# Patient Record
Sex: FEMALE | Race: WHITE | NOT HISPANIC OR LATINO | Employment: OTHER | URBAN - METROPOLITAN AREA
[De-identification: names, ages, dates, MRNs, and addresses within clinical notes are randomized per-mention and may not be internally consistent; named-entity substitution may affect disease eponyms.]

---

## 2019-02-21 ENCOUNTER — HOSPITAL ENCOUNTER (OUTPATIENT)
Dept: OTHER | Facility: HOSPITAL | Age: 79
Discharge: HOME | End: 2019-02-21
Attending: INTERNAL MEDICINE

## 2019-02-21 LAB
ALBUMIN SERPL-MCNC: 3.8 G/DL (ref 3.5–5)
ANION GAP SERPL CALC-SCNC: 16 MMOL/L (ref 8–19)
APPEARANCE UR: CLEAR
BILIRUB UR QL: NEGATIVE
BUN SERPL-MCNC: 23 MG/DL (ref 5–25)
BUN/CREAT SERPL: 14 {RATIO} (ref 6–20)
CALCIUM SERPL-MCNC: 9.4 MG/DL (ref 8.7–10.4)
CHLORIDE SERPL-SCNC: 105 MMOL/L (ref 99–111)
COLOR UR: YELLOW
CONV CO2: 25 MMOL/L (ref 22–32)
CONV COLLECTION SOURCE (UA): NORMAL
CONV CREATININE URINE, RANDOM: 146.8 MG/DL (ref 10–300)
CONV PROTEIN TO CREATININE RATIO (RANDOM URINE): 0.11 {RATIO} (ref 0–0.1)
CONV UROBILINOGEN IN URINE BY AUTOMATED TEST STRIP: 0.2 {EHRLICHU}/DL (ref 0.1–1)
CREAT UR-MCNC: 1.59 MG/DL (ref 0.5–0.9)
GFR SERPLBLD BASED ON 1.73 SQ M-ARVRAT: 30 ML/MIN/{1.73_M2}
GLUCOSE SERPL-MCNC: 121 MG/DL (ref 65–99)
GLUCOSE UR QL: NEGATIVE MG/DL
HGB UR QL STRIP: NEGATIVE
KETONES UR QL STRIP: NEGATIVE MG/DL
LEUKOCYTE ESTERASE UR QL STRIP: NEGATIVE
NITRITE UR QL STRIP: NEGATIVE
PH UR STRIP.AUTO: 5.5 [PH] (ref 5–8)
PHOSPHATE SERPL-MCNC: 3.2 MG/DL (ref 2.4–4.5)
POTASSIUM SERPL-SCNC: 4.4 MMOL/L (ref 3.5–5.3)
PROT UR QL: NEGATIVE MG/DL
PROT UR-MCNC: 16.6 MG/DL
SODIUM SERPL-SCNC: 142 MMOL/L (ref 135–147)
SP GR UR: 1.01 (ref 1–1.03)

## 2019-02-22 LAB — PTH-INTACT SERPL-MCNC: 66.4 PG/ML (ref 11.1–79.5)

## 2019-04-10 ENCOUNTER — HOSPITAL ENCOUNTER (OUTPATIENT)
Dept: OTHER | Facility: HOSPITAL | Age: 79
Discharge: HOME | End: 2019-04-10
Attending: INTERNAL MEDICINE

## 2019-04-10 LAB
ALBUMIN SERPL-MCNC: 4.1 G/DL (ref 3.5–5)
ALBUMIN/GLOB SERPL: 1.3 {RATIO} (ref 1.4–2.6)
ALP SERPL-CCNC: 81 U/L (ref 43–160)
ALT SERPL-CCNC: 19 U/L (ref 10–40)
ANION GAP SERPL CALC-SCNC: 23 MMOL/L (ref 8–19)
AST SERPL-CCNC: 25 U/L (ref 15–50)
BILIRUB SERPL-MCNC: 0.32 MG/DL (ref 0.2–1.3)
BUN SERPL-MCNC: 22 MG/DL (ref 5–25)
BUN/CREAT SERPL: 15 {RATIO} (ref 6–20)
CALCIUM SERPL-MCNC: 9.2 MG/DL (ref 8.7–10.4)
CHLORIDE SERPL-SCNC: 103 MMOL/L (ref 99–111)
CHOLEST SERPL-MCNC: 167 MG/DL (ref 107–200)
CHOLEST/HDLC SERPL: 3.6 {RATIO} (ref 3–6)
CONV CO2: 22 MMOL/L (ref 22–32)
CONV TOTAL PROTEIN: 7.3 G/DL (ref 6.3–8.2)
CREAT UR-MCNC: 1.5 MG/DL (ref 0.5–0.9)
EST. AVERAGE GLUCOSE BLD GHB EST-MCNC: 143 MG/DL
GFR SERPLBLD BASED ON 1.73 SQ M-ARVRAT: 32 ML/MIN/{1.73_M2}
GLOBULIN UR ELPH-MCNC: 3.2 G/DL (ref 2–3.5)
GLUCOSE SERPL-MCNC: 137 MG/DL (ref 65–99)
HBA1C MFR BLD: 6.6 % (ref 3.5–5.7)
HDLC SERPL-MCNC: 47 MG/DL (ref 40–60)
LDLC SERPL CALC-MCNC: 99 MG/DL (ref 70–100)
OSMOLALITY SERPL CALC.SUM OF ELEC: 301 MOSM/KG (ref 273–304)
PHOSPHATE SERPL-MCNC: 3.5 MG/DL (ref 2.4–4.5)
POTASSIUM SERPL-SCNC: 4.7 MMOL/L (ref 3.5–5.3)
PTH-INTACT SERPL-MCNC: 68.9 PG/ML (ref 11.1–79.5)
SODIUM SERPL-SCNC: 143 MMOL/L (ref 135–147)
TRIGL SERPL-MCNC: 104 MG/DL (ref 40–150)
VLDLC SERPL-MCNC: 21 MG/DL (ref 5–37)

## 2019-04-19 ENCOUNTER — HOSPITAL ENCOUNTER (OUTPATIENT)
Dept: ULTRASOUND IMAGING | Facility: HOSPITAL | Age: 79
Discharge: HOME | End: 2019-04-19
Attending: SPECIALIST

## 2019-05-13 ENCOUNTER — HOSPITAL ENCOUNTER (OUTPATIENT)
Dept: ULTRASOUND IMAGING | Facility: HOSPITAL | Age: 79
Discharge: HOME | End: 2019-05-13
Attending: PHYSICIAN ASSISTANT

## 2019-05-15 ENCOUNTER — HOSPITAL ENCOUNTER (OUTPATIENT)
Dept: SLEEP MEDICINE | Facility: HOSPITAL | Age: 79
Discharge: HOME | End: 2019-05-15
Attending: PSYCHIATRY & NEUROLOGY

## 2019-07-10 ENCOUNTER — HOSPITAL ENCOUNTER (OUTPATIENT)
Dept: URGENT CARE | Facility: CLINIC | Age: 79
Discharge: HOME | End: 2019-07-10
Attending: EMERGENCY MEDICINE

## 2019-08-23 ENCOUNTER — HOSPITAL ENCOUNTER (OUTPATIENT)
Dept: OTHER | Facility: HOSPITAL | Age: 79
Discharge: HOME | End: 2019-08-23
Attending: INTERNAL MEDICINE

## 2019-08-23 LAB
ALBUMIN SERPL-MCNC: 3.8 G/DL (ref 3.5–5)
ANION GAP SERPL CALC-SCNC: 17 MMOL/L (ref 8–19)
APPEARANCE UR: CLEAR
BILIRUB UR QL: NEGATIVE
BUN SERPL-MCNC: 19 MG/DL (ref 5–25)
BUN/CREAT SERPL: 13 {RATIO} (ref 6–20)
CALCIUM SERPL-MCNC: 9.5 MG/DL (ref 8.7–10.4)
CHLORIDE SERPL-SCNC: 102 MMOL/L (ref 99–111)
COLOR UR: YELLOW
CONV BACTERIA: NEGATIVE
CONV CO2: 25 MMOL/L (ref 22–32)
CONV COLLECTION SOURCE (UA): ABNORMAL
CONV UROBILINOGEN IN URINE BY AUTOMATED TEST STRIP: 0.2 {EHRLICHU}/DL (ref 0.1–1)
CREAT UR-MCNC: 1.45 MG/DL (ref 0.5–0.9)
GFR SERPLBLD BASED ON 1.73 SQ M-ARVRAT: 34 ML/MIN/{1.73_M2}
GLUCOSE SERPL-MCNC: 172 MG/DL (ref 65–99)
GLUCOSE UR QL: NEGATIVE MG/DL
HGB UR QL STRIP: NEGATIVE
KETONES UR QL STRIP: NEGATIVE MG/DL
LEUKOCYTE ESTERASE UR QL STRIP: ABNORMAL
NITRITE UR QL STRIP: NEGATIVE
PH UR STRIP.AUTO: 7.5 [PH] (ref 5–8)
PHOSPHATE SERPL-MCNC: 3.1 MG/DL (ref 2.4–4.5)
POTASSIUM SERPL-SCNC: 4.5 MMOL/L (ref 3.5–5.3)
PROT UR QL: NEGATIVE MG/DL
PTH-INTACT SERPL-MCNC: 33 PG/ML (ref 11.1–79.5)
RBC #/AREA URNS HPF: ABNORMAL /[HPF]
SODIUM SERPL-SCNC: 139 MMOL/L (ref 135–147)
SP GR UR: 1.01 (ref 1–1.03)
SQUAMOUS SPT QL MICRO: ABNORMAL /[HPF]
WBC #/AREA URNS HPF: ABNORMAL /[HPF]

## 2019-09-13 ENCOUNTER — HOSPITAL ENCOUNTER (OUTPATIENT)
Dept: ULTRASOUND IMAGING | Facility: HOSPITAL | Age: 79
Discharge: HOME | End: 2019-09-13
Attending: INTERNAL MEDICINE

## 2019-09-18 ENCOUNTER — HOSPITAL ENCOUNTER (OUTPATIENT)
Dept: OTHER | Facility: HOSPITAL | Age: 79
Discharge: HOME | End: 2019-09-18
Attending: OPHTHALMOLOGY

## 2019-10-11 ENCOUNTER — HOSPITAL ENCOUNTER (OUTPATIENT)
Dept: OTHER | Facility: HOSPITAL | Age: 79
Discharge: HOME | End: 2019-10-11

## 2019-10-11 LAB
ANION GAP SERPL CALC-SCNC: 15 MMOL/L (ref 8–19)
BUN SERPL-MCNC: 23 MG/DL (ref 5–25)
BUN/CREAT SERPL: 16 {RATIO} (ref 6–20)
CALCIUM SERPL-MCNC: 9.5 MG/DL (ref 8.7–10.4)
CHLORIDE SERPL-SCNC: 104 MMOL/L (ref 99–111)
CONV CO2: 26 MMOL/L (ref 22–32)
CONV CREATININE URINE, RANDOM: 29.2 MG/DL (ref 10–300)
CONV MICROALBUM.,U,RANDOM: <12 MG/L (ref 0–20)
CREAT UR-MCNC: 1.45 MG/DL (ref 0.5–0.9)
EST. AVERAGE GLUCOSE BLD GHB EST-MCNC: 169 MG/DL
GFR SERPLBLD BASED ON 1.73 SQ M-ARVRAT: 34 ML/MIN/{1.73_M2}
GLUCOSE SERPL-MCNC: 158 MG/DL (ref 65–99)
HBA1C MFR BLD: 7.5 % (ref 3.5–5.7)
MICROALBUMIN/CREAT UR: 41.1 MG/G{CRE} (ref 0–35)
OSMOLALITY SERPL CALC.SUM OF ELEC: 299 MOSM/KG (ref 273–304)
POTASSIUM SERPL-SCNC: 4.4 MMOL/L (ref 3.5–5.3)
SODIUM SERPL-SCNC: 141 MMOL/L (ref 135–147)

## 2019-10-14 ENCOUNTER — HOSPITAL ENCOUNTER (OUTPATIENT)
Dept: GENERAL RADIOLOGY | Facility: HOSPITAL | Age: 79
Discharge: HOME | End: 2019-10-14

## 2019-11-26 ENCOUNTER — HOSPITAL ENCOUNTER (OUTPATIENT)
Dept: SLEEP MEDICINE | Facility: HOSPITAL | Age: 79
Discharge: HOME | End: 2019-11-26
Attending: PSYCHIATRY & NEUROLOGY

## 2020-02-21 ENCOUNTER — HOSPITAL ENCOUNTER (OUTPATIENT)
Dept: OTHER | Facility: HOSPITAL | Age: 80
Discharge: HOME | End: 2020-02-21
Attending: NURSE PRACTITIONER

## 2020-02-21 LAB
25(OH)D3 SERPL-MCNC: 30.1 NG/ML (ref 30–100)
ALBUMIN SERPL-MCNC: 3.8 G/DL (ref 3.5–5)
ANION GAP SERPL CALC-SCNC: 17 MMOL/L (ref 8–19)
APPEARANCE UR: CLEAR
BASOPHILS # BLD AUTO: 0.03 10*3/UL (ref 0–0.2)
BASOPHILS NFR BLD AUTO: 0.6 % (ref 0–3)
BILIRUB UR QL: NEGATIVE
BUN SERPL-MCNC: 16 MG/DL (ref 5–25)
BUN/CREAT SERPL: 12 {RATIO} (ref 6–20)
CALCIUM SERPL-MCNC: 9.2 MG/DL (ref 8.7–10.4)
CHLORIDE SERPL-SCNC: 102 MMOL/L (ref 99–111)
COLOR UR: YELLOW
CONV ABS IMM GRAN: 0.01 10*3/UL (ref 0–0.2)
CONV BACTERIA: NEGATIVE
CONV CO2: 28 MMOL/L (ref 22–32)
CONV COLLECTION SOURCE (UA): ABNORMAL
CONV CREATININE URINE, RANDOM: 31.9 MG/DL (ref 10–300)
CONV IMMATURE GRAN: 0.2 % (ref 0–1.8)
CONV UROBILINOGEN IN URINE BY AUTOMATED TEST STRIP: 0.2 {EHRLICHU}/DL (ref 0.1–1)
CREAT UR-MCNC: 1.33 MG/DL (ref 0.5–0.9)
DEPRECATED RDW RBC AUTO: 43.7 FL (ref 36.4–46.3)
EOSINOPHIL # BLD AUTO: 0.2 10*3/UL (ref 0–0.7)
EOSINOPHIL # BLD AUTO: 4.1 % (ref 0–7)
ERYTHROCYTE [DISTWIDTH] IN BLOOD BY AUTOMATED COUNT: 13 % (ref 11.7–14.4)
GFR SERPLBLD BASED ON 1.73 SQ M-ARVRAT: 37 ML/MIN/{1.73_M2}
GLUCOSE SERPL-MCNC: 148 MG/DL (ref 65–99)
GLUCOSE UR QL: NEGATIVE MG/DL
HCT VFR BLD AUTO: 41.9 % (ref 37–47)
HGB BLD-MCNC: 13 G/DL (ref 12–16)
HGB UR QL STRIP: NEGATIVE
KETONES UR QL STRIP: NEGATIVE MG/DL
LEUKOCYTE ESTERASE UR QL STRIP: ABNORMAL
LYMPHOCYTES # BLD AUTO: 1.33 10*3/UL (ref 1–5)
LYMPHOCYTES NFR BLD AUTO: 27.5 % (ref 20–45)
MCH RBC QN AUTO: 28.6 PG (ref 27–31)
MCHC RBC AUTO-ENTMCNC: 31 G/DL (ref 33–37)
MCV RBC AUTO: 92.1 FL (ref 81–99)
MONOCYTES # BLD AUTO: 0.51 10*3/UL (ref 0.2–1.2)
MONOCYTES NFR BLD AUTO: 10.6 % (ref 3–10)
NEUTROPHILS # BLD AUTO: 2.75 10*3/UL (ref 2–8)
NEUTROPHILS NFR BLD AUTO: 57 % (ref 30–85)
NITRITE UR QL STRIP: NEGATIVE
NRBC CBCN: 0 % (ref 0–0.7)
PH UR STRIP.AUTO: 7 [PH] (ref 5–8)
PHOSPHATE SERPL-MCNC: 2.3 MG/DL (ref 2.4–4.5)
PLATELET # BLD AUTO: 158 10*3/UL (ref 130–400)
PMV BLD AUTO: 10.7 FL (ref 9.4–12.3)
POTASSIUM SERPL-SCNC: 4.7 MMOL/L (ref 3.5–5.3)
PROT UR QL: NEGATIVE MG/DL
PROT UR-MCNC: 6.3 MG/DL
PTH-INTACT SERPL-MCNC: 62 PG/ML (ref 11.1–79.5)
RBC # BLD AUTO: 4.55 10*6/UL (ref 4.2–5.4)
RBC #/AREA URNS HPF: ABNORMAL /[HPF]
SODIUM SERPL-SCNC: 142 MMOL/L (ref 135–147)
SP GR UR: 1.01 (ref 1–1.03)
WBC # BLD AUTO: 4.83 10*3/UL (ref 4.8–10.8)
WBC #/AREA URNS HPF: ABNORMAL /[HPF]

## 2020-03-10 ENCOUNTER — HOSPITAL ENCOUNTER (OUTPATIENT)
Dept: ULTRASOUND IMAGING | Facility: HOSPITAL | Age: 80
Discharge: HOME | End: 2020-03-10
Attending: INTERNAL MEDICINE

## 2020-06-10 ENCOUNTER — HOSPITAL ENCOUNTER (OUTPATIENT)
Dept: ULTRASOUND IMAGING | Facility: HOSPITAL | Age: 80
Discharge: HOME | End: 2020-06-10
Attending: PHYSICIAN ASSISTANT

## 2020-06-10 LAB
T4 FREE SERPL-MCNC: 1.2 NG/DL (ref 0.9–1.8)
TSH SERPL-ACNC: 0.64 M[IU]/L (ref 0.27–4.2)

## 2020-07-30 ENCOUNTER — HOSPITAL ENCOUNTER (OUTPATIENT)
Dept: ULTRASOUND IMAGING | Facility: HOSPITAL | Age: 80
Discharge: HOME | End: 2020-07-30
Attending: INTERNAL MEDICINE

## 2020-07-30 LAB
CREAT BLD-MCNC: 1.4 MG/DL (ref 0.6–1.4)
GFR SERPLBLD BASED ON 1.73 SQ M-ARVRAT: 35 ML/MIN/{1.73_M2}

## 2020-08-18 ENCOUNTER — HOSPITAL ENCOUNTER (OUTPATIENT)
Dept: LAB | Facility: HOSPITAL | Age: 80
Discharge: HOME | End: 2020-08-18
Attending: INTERNAL MEDICINE

## 2020-08-18 LAB
25(OH)D3 SERPL-MCNC: 38.5 NG/ML (ref 30–100)
ALBUMIN SERPL-MCNC: 3.9 G/DL (ref 3.5–5)
ANION GAP SERPL CALC-SCNC: 15 MMOL/L (ref 8–19)
APPEARANCE UR: CLEAR
BASOPHILS # BLD AUTO: 0.04 10*3/UL (ref 0–0.2)
BASOPHILS NFR BLD AUTO: 0.7 % (ref 0–3)
BILIRUB UR QL: NEGATIVE
BUN SERPL-MCNC: 28 MG/DL (ref 5–25)
BUN/CREAT SERPL: 16 {RATIO} (ref 6–20)
CALCIUM SERPL-MCNC: 9.7 MG/DL (ref 8.7–10.4)
CHLORIDE SERPL-SCNC: 101 MMOL/L (ref 99–111)
COLOR UR: YELLOW
CONV ABS IMM GRAN: 0.02 10*3/UL (ref 0–0.2)
CONV BACTERIA: NEGATIVE
CONV CO2: 28 MMOL/L (ref 22–32)
CONV COLLECTION SOURCE (UA): ABNORMAL
CONV CREATININE URINE, RANDOM: 58.2 MG/DL (ref 10–300)
CONV IMMATURE GRAN: 0.3 % (ref 0–1.8)
CONV PROTEIN TO CREATININE RATIO (RANDOM URINE): 0.11 {RATIO} (ref 0–0.1)
CONV UROBILINOGEN IN URINE BY AUTOMATED TEST STRIP: 0.2 {EHRLICHU}/DL (ref 0.1–1)
CREAT UR-MCNC: 1.8 MG/DL (ref 0.5–0.9)
DEPRECATED RDW RBC AUTO: 49.2 FL (ref 36.4–46.3)
EOSINOPHIL # BLD AUTO: 0.19 10*3/UL (ref 0–0.7)
EOSINOPHIL # BLD AUTO: 3.1 % (ref 0–7)
ERYTHROCYTE [DISTWIDTH] IN BLOOD BY AUTOMATED COUNT: 14.3 % (ref 11.7–14.4)
GFR SERPLBLD BASED ON 1.73 SQ M-ARVRAT: 26 ML/MIN/{1.73_M2}
GLUCOSE SERPL-MCNC: 180 MG/DL (ref 65–99)
GLUCOSE UR QL: NEGATIVE MG/DL
HCT VFR BLD AUTO: 43.5 % (ref 37–47)
HGB BLD-MCNC: 13.8 G/DL (ref 12–16)
HGB UR QL STRIP: NEGATIVE
KETONES UR QL STRIP: NEGATIVE MG/DL
LEUKOCYTE ESTERASE UR QL STRIP: ABNORMAL
LYMPHOCYTES # BLD AUTO: 1.39 10*3/UL (ref 1–5)
LYMPHOCYTES NFR BLD AUTO: 22.9 % (ref 20–45)
MCH RBC QN AUTO: 30.1 PG (ref 27–31)
MCHC RBC AUTO-ENTMCNC: 31.7 G/DL (ref 33–37)
MCV RBC AUTO: 94.8 FL (ref 81–99)
MONOCYTES # BLD AUTO: 0.57 10*3/UL (ref 0.2–1.2)
MONOCYTES NFR BLD AUTO: 9.4 % (ref 3–10)
NEUTROPHILS # BLD AUTO: 3.85 10*3/UL (ref 2–8)
NEUTROPHILS NFR BLD AUTO: 63.6 % (ref 30–85)
NITRITE UR QL STRIP: NEGATIVE
NRBC CBCN: 0 % (ref 0–0.7)
PH UR STRIP.AUTO: 6 [PH] (ref 5–8)
PHOSPHATE SERPL-MCNC: 3.3 MG/DL (ref 2.4–4.5)
PLATELET # BLD AUTO: 182 10*3/UL (ref 130–400)
PMV BLD AUTO: 11.2 FL (ref 9.4–12.3)
POTASSIUM SERPL-SCNC: 4.2 MMOL/L (ref 3.5–5.3)
PROT UR QL: NEGATIVE MG/DL
PROT UR-MCNC: 6.2 MG/DL
PTH-INTACT SERPL-MCNC: 47 PG/ML (ref 11.1–79.5)
RBC # BLD AUTO: 4.59 10*6/UL (ref 4.2–5.4)
RBC #/AREA URNS HPF: ABNORMAL /[HPF]
SODIUM SERPL-SCNC: 140 MMOL/L (ref 135–147)
SP GR UR: 1.01 (ref 1–1.03)
SQUAMOUS SPT QL MICRO: ABNORMAL /[HPF]
WBC # BLD AUTO: 6.06 10*3/UL (ref 4.8–10.8)
WBC #/AREA URNS HPF: ABNORMAL /[HPF]

## 2020-09-22 ENCOUNTER — HOSPITAL ENCOUNTER (OUTPATIENT)
Dept: ULTRASOUND IMAGING | Facility: HOSPITAL | Age: 80
Discharge: HOME | End: 2020-09-22
Attending: SPECIALIST

## 2020-10-02 ENCOUNTER — HOSPITAL ENCOUNTER (OUTPATIENT)
Dept: MAMMOGRAPHY | Facility: HOSPITAL | Age: 80
Discharge: HOME | End: 2020-10-02
Attending: INTERNAL MEDICINE

## 2020-11-18 ENCOUNTER — HOSPITAL ENCOUNTER (OUTPATIENT)
Dept: SLEEP MEDICINE | Facility: HOSPITAL | Age: 80
Discharge: HOME | End: 2020-11-18
Attending: PSYCHIATRY & NEUROLOGY

## 2020-12-11 ENCOUNTER — HOSPITAL ENCOUNTER (OUTPATIENT)
Dept: ULTRASOUND IMAGING | Facility: HOSPITAL | Age: 80
Discharge: HOME | End: 2020-12-11
Attending: PHYSICIAN ASSISTANT

## 2020-12-11 LAB
T4 FREE SERPL-MCNC: 1.1 NG/DL (ref 0.9–1.8)
TSH SERPL-ACNC: 0.52 M[IU]/L (ref 0.27–4.2)

## 2021-01-08 ENCOUNTER — HOSPITAL ENCOUNTER (OUTPATIENT)
Dept: OTHER | Facility: HOSPITAL | Age: 81
Discharge: HOME | End: 2021-01-08
Attending: INTERNAL MEDICINE

## 2021-01-08 LAB — PTH-INTACT SERPL-MCNC: 45.3 PG/ML (ref 11.1–79.5)

## 2021-02-02 ENCOUNTER — HOSPITAL ENCOUNTER (OUTPATIENT)
Dept: SLEEP MEDICINE | Facility: HOSPITAL | Age: 81
Discharge: HOME | End: 2021-02-02
Attending: PSYCHIATRY & NEUROLOGY

## 2021-03-23 ENCOUNTER — HOSPITAL ENCOUNTER (OUTPATIENT)
Dept: ULTRASOUND IMAGING | Facility: HOSPITAL | Age: 81
Discharge: HOME | End: 2021-03-23
Attending: INTERNAL MEDICINE

## 2021-07-26 ENCOUNTER — LAB (OUTPATIENT)
Dept: LAB | Facility: HOSPITAL | Age: 81
End: 2021-07-26

## 2021-07-26 ENCOUNTER — TRANSCRIBE ORDERS (OUTPATIENT)
Dept: LAB | Facility: HOSPITAL | Age: 81
End: 2021-07-26

## 2021-07-26 DIAGNOSIS — I10 ESSENTIAL HYPERTENSION, MALIGNANT: ICD-10-CM

## 2021-07-26 DIAGNOSIS — I48.0 PAROXYSMAL ATRIAL FIBRILLATION (HCC): Primary | ICD-10-CM

## 2021-07-26 DIAGNOSIS — I48.0 PAROXYSMAL ATRIAL FIBRILLATION (HCC): ICD-10-CM

## 2021-07-26 LAB
BASOPHILS # BLD AUTO: 0.05 10*3/MM3 (ref 0–0.2)
BASOPHILS NFR BLD AUTO: 0.8 % (ref 0–1.5)
DEPRECATED RDW RBC AUTO: 40.4 FL (ref 37–54)
EOSINOPHIL # BLD AUTO: 0.18 10*3/MM3 (ref 0–0.4)
EOSINOPHIL NFR BLD AUTO: 3 % (ref 0.3–6.2)
ERYTHROCYTE [DISTWIDTH] IN BLOOD BY AUTOMATED COUNT: 12.4 % (ref 12.3–15.4)
HCT VFR BLD AUTO: 41.1 % (ref 34–46.6)
HGB BLD-MCNC: 13.5 G/DL (ref 12–15.9)
IMM GRANULOCYTES # BLD AUTO: 0.02 10*3/MM3 (ref 0–0.05)
IMM GRANULOCYTES NFR BLD AUTO: 0.3 % (ref 0–0.5)
LYMPHOCYTES # BLD AUTO: 1.49 10*3/MM3 (ref 0.7–3.1)
LYMPHOCYTES NFR BLD AUTO: 24.8 % (ref 19.6–45.3)
MCH RBC QN AUTO: 29.9 PG (ref 26.6–33)
MCHC RBC AUTO-ENTMCNC: 32.8 G/DL (ref 31.5–35.7)
MCV RBC AUTO: 90.9 FL (ref 79–97)
MONOCYTES # BLD AUTO: 0.56 10*3/MM3 (ref 0.1–0.9)
MONOCYTES NFR BLD AUTO: 9.3 % (ref 5–12)
NEUTROPHILS NFR BLD AUTO: 3.72 10*3/MM3 (ref 1.7–7)
NEUTROPHILS NFR BLD AUTO: 61.8 % (ref 42.7–76)
NRBC BLD AUTO-RTO: 0 /100 WBC (ref 0–0.2)
PLATELET # BLD AUTO: 215 10*3/MM3 (ref 140–450)
PMV BLD AUTO: 10.9 FL (ref 6–12)
RBC # BLD AUTO: 4.52 10*6/MM3 (ref 3.77–5.28)
WBC # BLD AUTO: 6.02 10*3/MM3 (ref 3.4–10.8)

## 2021-07-26 PROCEDURE — 85025 COMPLETE CBC W/AUTO DIFF WBC: CPT

## 2021-07-26 PROCEDURE — 80048 BASIC METABOLIC PNL TOTAL CA: CPT

## 2021-07-26 PROCEDURE — 36415 COLL VENOUS BLD VENIPUNCTURE: CPT

## 2021-07-27 LAB
ANION GAP SERPL CALCULATED.3IONS-SCNC: 6.9 MMOL/L (ref 5–15)
BUN SERPL-MCNC: 16 MG/DL (ref 8–23)
BUN/CREAT SERPL: 11.1 (ref 7–25)
CALCIUM SPEC-SCNC: 9.2 MG/DL (ref 8.6–10.5)
CHLORIDE SERPL-SCNC: 105 MMOL/L (ref 98–107)
CO2 SERPL-SCNC: 29.1 MMOL/L (ref 22–29)
CREAT SERPL-MCNC: 1.44 MG/DL (ref 0.57–1)
GFR SERPL CREATININE-BSD FRML MDRD: 35 ML/MIN/1.73
GLUCOSE SERPL-MCNC: 94 MG/DL (ref 65–99)
POTASSIUM SERPL-SCNC: 4.6 MMOL/L (ref 3.5–5.2)
SODIUM SERPL-SCNC: 141 MMOL/L (ref 136–145)

## 2021-08-09 PROCEDURE — U0003 INFECTIOUS AGENT DETECTION BY NUCLEIC ACID (DNA OR RNA); SEVERE ACUTE RESPIRATORY SYNDROME CORONAVIRUS 2 (SARS-COV-2) (CORONAVIRUS DISEASE [COVID-19]), AMPLIFIED PROBE TECHNIQUE, MAKING USE OF HIGH THROUGHPUT TECHNOLOGIES AS DESCRIBED BY CMS-2020-01-R: HCPCS | Performed by: PHYSICIAN ASSISTANT

## 2021-08-09 PROCEDURE — U0005 INFEC AGEN DETEC AMPLI PROBE: HCPCS | Performed by: PHYSICIAN ASSISTANT

## 2021-09-16 ENCOUNTER — LAB (OUTPATIENT)
Dept: LAB | Facility: HOSPITAL | Age: 81
End: 2021-09-16

## 2021-09-16 ENCOUNTER — TRANSCRIBE ORDERS (OUTPATIENT)
Dept: ADMINISTRATIVE | Facility: HOSPITAL | Age: 81
End: 2021-09-16

## 2021-09-16 DIAGNOSIS — E55.9 VITAMIN D DEFICIENCY: ICD-10-CM

## 2021-09-16 DIAGNOSIS — N18.30 CHRONIC KIDNEY FAILURE, STAGE 3 (MODERATE) (HCC): Primary | ICD-10-CM

## 2021-09-16 DIAGNOSIS — N18.30 CHRONIC KIDNEY FAILURE, STAGE 3 (MODERATE) (HCC): ICD-10-CM

## 2021-09-16 LAB
25(OH)D3 SERPL-MCNC: 32.9 NG/ML
ALBUMIN SERPL-MCNC: 4 G/DL (ref 3.5–5.2)
ANION GAP SERPL CALCULATED.3IONS-SCNC: 10 MMOL/L (ref 5–15)
BASOPHILS # BLD AUTO: 0.04 10*3/MM3 (ref 0–0.2)
BASOPHILS NFR BLD AUTO: 0.7 % (ref 0–1.5)
BUN SERPL-MCNC: 17 MG/DL (ref 8–23)
BUN/CREAT SERPL: 12.8 (ref 7–25)
CALCIUM SPEC-SCNC: 9.5 MG/DL (ref 8.6–10.5)
CHLORIDE SERPL-SCNC: 105 MMOL/L (ref 98–107)
CO2 SERPL-SCNC: 25 MMOL/L (ref 22–29)
CREAT SERPL-MCNC: 1.33 MG/DL (ref 0.57–1)
DEPRECATED RDW RBC AUTO: 43.5 FL (ref 37–54)
EOSINOPHIL # BLD AUTO: 0.19 10*3/MM3 (ref 0–0.4)
EOSINOPHIL NFR BLD AUTO: 3.5 % (ref 0.3–6.2)
ERYTHROCYTE [DISTWIDTH] IN BLOOD BY AUTOMATED COUNT: 12.7 % (ref 12.3–15.4)
GFR SERPL CREATININE-BSD FRML MDRD: 38 ML/MIN/1.73
GLUCOSE SERPL-MCNC: 108 MG/DL (ref 65–99)
HCT VFR BLD AUTO: 43.5 % (ref 34–46.6)
HGB BLD-MCNC: 13.6 G/DL (ref 12–15.9)
IMM GRANULOCYTES # BLD AUTO: 0.02 10*3/MM3 (ref 0–0.05)
IMM GRANULOCYTES NFR BLD AUTO: 0.4 % (ref 0–0.5)
LYMPHOCYTES # BLD AUTO: 1.22 10*3/MM3 (ref 0.7–3.1)
LYMPHOCYTES NFR BLD AUTO: 22.5 % (ref 19.6–45.3)
MCH RBC QN AUTO: 29.3 PG (ref 26.6–33)
MCHC RBC AUTO-ENTMCNC: 31.3 G/DL (ref 31.5–35.7)
MCV RBC AUTO: 93.8 FL (ref 79–97)
MONOCYTES # BLD AUTO: 0.59 10*3/MM3 (ref 0.1–0.9)
MONOCYTES NFR BLD AUTO: 10.9 % (ref 5–12)
NEUTROPHILS NFR BLD AUTO: 3.36 10*3/MM3 (ref 1.7–7)
NEUTROPHILS NFR BLD AUTO: 62 % (ref 42.7–76)
NRBC BLD AUTO-RTO: 0 /100 WBC (ref 0–0.2)
PHOSPHATE SERPL-MCNC: 2.7 MG/DL (ref 2.5–4.5)
PLATELET # BLD AUTO: 178 10*3/MM3 (ref 140–450)
PMV BLD AUTO: 11.7 FL (ref 6–12)
POTASSIUM SERPL-SCNC: 4 MMOL/L (ref 3.5–5.2)
PTH-INTACT SERPL-MCNC: 28.3 PG/ML (ref 15–65)
RBC # BLD AUTO: 4.64 10*6/MM3 (ref 3.77–5.28)
SODIUM SERPL-SCNC: 140 MMOL/L (ref 136–145)
WBC # BLD AUTO: 5.42 10*3/MM3 (ref 3.4–10.8)

## 2021-09-16 PROCEDURE — 85025 COMPLETE CBC W/AUTO DIFF WBC: CPT

## 2021-09-16 PROCEDURE — 36415 COLL VENOUS BLD VENIPUNCTURE: CPT

## 2021-09-16 PROCEDURE — 80069 RENAL FUNCTION PANEL: CPT

## 2021-09-16 PROCEDURE — 82306 VITAMIN D 25 HYDROXY: CPT

## 2021-09-16 PROCEDURE — 83970 ASSAY OF PARATHORMONE: CPT

## 2021-09-27 ENCOUNTER — TRANSCRIBE ORDERS (OUTPATIENT)
Dept: ADMINISTRATIVE | Facility: HOSPITAL | Age: 81
End: 2021-09-27

## 2021-09-27 DIAGNOSIS — Z12.31 ENCOUNTER FOR SCREENING MAMMOGRAM FOR BREAST CANCER: Primary | ICD-10-CM

## 2021-09-27 DIAGNOSIS — K74.60 CIRRHOSIS OF LIVER WITHOUT ASCITES, UNSPECIFIED HEPATIC CIRRHOSIS TYPE (HCC): Primary | ICD-10-CM

## 2021-10-05 ENCOUNTER — OFFICE VISIT (OUTPATIENT)
Dept: SLEEP MEDICINE | Facility: HOSPITAL | Age: 81
End: 2021-10-05

## 2021-10-05 VITALS
WEIGHT: 208.8 LBS | OXYGEN SATURATION: 98 % | HEIGHT: 59 IN | SYSTOLIC BLOOD PRESSURE: 156 MMHG | TEMPERATURE: 97.1 F | HEART RATE: 72 BPM | BODY MASS INDEX: 42.09 KG/M2 | DIASTOLIC BLOOD PRESSURE: 79 MMHG

## 2021-10-05 DIAGNOSIS — G47.33 OSA (OBSTRUCTIVE SLEEP APNEA): ICD-10-CM

## 2021-10-05 PROCEDURE — G0463 HOSPITAL OUTPT CLINIC VISIT: HCPCS

## 2021-10-05 NOTE — PROGRESS NOTES
[facility]    SLEEP CLINIC FOLLOW UP PROGRESS NOTE.    Bella Ritter  1940  81 y.o.  female      PCP: Cindy Sal MD      Date of visit: 10/5/2021  The patient is returning for follow-up visit earlier than her scheduled appointment.  She is here today concerned about her machine being recalled.      HPI:  This is a 81 y.o. years old patient who has a history of obstructive sleep apnea..  Sleep apnea is severe with a AHI of 38 point/hr. Patient is using positive airway pressure therapy with CPAP and the symptoms of snoring, non-restorative sleep and daytime excessive sleepiness have improved significantly on the therapy. Normally goes to bed at 12 midnight and wakes up at 12 noon.  The patient wakes up 2-3 time(s) during the night usually to go to the bathroom and has no problem going back to sleep.  Feels refreshed after waking up.  Patient also denies headaches and nasal congestion.   She recently received notice about machine recall.  She was very concerned about this because she cannot sleep without her CPAP machine.  She decided to come and find out what could be done.  She is not sure how long she has had her latest CPAP machine however, she still has her old one.  She is using a full facemask and denies any problems with device settings.    Mediactions and allergies are reviewed by me and documented in the encounter.     SOCIAL ( habits pertaining to sleep medicine)  History of smoking:No   History of alcohol use: 0 per week  Caffeine use: 0    REVIEW OF SYSTEMS:   John Day Sleepiness Scale :Total score: 0   Nsaal congestion: No  Dry mouth/nose: Yes  Post nasal drip; no  Acid reflux/Heartburn: No  Abd bloating: Yes  Morining headache: No  Anxiety: No  Depression: No    Physical Exam :  CONSTITUTIONAL:  Vitals:    10/05/21 1500   BP: 156/79   BP Location: Left arm   Patient Position: Sitting   Pulse: 72   Temp: 97.1 °F (36.2 °C)   SpO2: 98%   Weight: 94.7 kg (208 lb 12.8 oz)   Height:  "149.9 cm (59\")    Body mass index is 42.17 kg/m².   She he is awake alert and oriented.  Responses are coherent and relevant.  She is very concerned about the machine recall.    Data reviewed:  The Smart card downloaded on 10/5/2021 has been reviewed independently by me for compliance and discussed the data with the patient.   Compliance; 100%  More than 4 hr use, 100%  Average use of the device 10 hours 32 minutes per night  Residual AHI: 7.7 /hr (goal < 5.0 /hr)  Mask type: Full face  DME: [facility]    ASSESSMENT AND PLAN:  · Obstructive sleep apnea ( G 47.33).  The symptoms of sleep apnea have improved with the device and the treatment.  Patient's compliance with the device is excellent for treatment of sleep apnea.  I have independently reviewed the smart card down load and discussed with the patient the download data and encouraged  the patient to continue to use the device.The residual AHI is acceptable. The patient is also instructed to get the supplies from the DME [facility] and and change them on a regular basis.  A prescription for supplies has been sent to the [facility].  I have also discussed the good sleep hygiene habits and adequate amount of sleep needed for good health.  · Obesity  · I have discussed with the patient different aspects about equipment recall and told her that she will probably get a replacement however, date of this is uncertain.  · In the meantime, she was advised to continue to use her CPAP machine regularly.  He is not going to use cleaning devices that are not endorsed by the .  · If she qualifies for a new CPAP unit, I told her that I can go ahead and order her a new one.  · The other option is to use her old CPAP machine if this is still available and still working.  · I also told her looking into the possibility that insurance may pay for a loaner while she is waiting for a replacement.  · She is going to bring her smart card from her old machine to see whether she can " use this.  · No follow-ups on file. . Patient's questions were answered.      Kimberly Rodriguez MD  10/5/2021

## 2021-10-07 ENCOUNTER — HOSPITAL ENCOUNTER (OUTPATIENT)
Dept: ULTRASOUND IMAGING | Facility: HOSPITAL | Age: 81
Discharge: HOME | End: 2021-10-07
Admitting: INTERNAL MEDICINE

## 2021-10-07 DIAGNOSIS — K74.60 CIRRHOSIS OF LIVER WITHOUT ASCITES, UNSPECIFIED HEPATIC CIRRHOSIS TYPE (HCC): ICD-10-CM

## 2021-10-07 PROCEDURE — 76705 ECHO EXAM OF ABDOMEN: CPT

## 2021-11-17 ENCOUNTER — TRANSCRIBE ORDERS (OUTPATIENT)
Dept: ADMINISTRATIVE | Facility: HOSPITAL | Age: 81
End: 2021-11-17

## 2021-11-17 DIAGNOSIS — E04.9 GOITER: Primary | ICD-10-CM

## 2021-12-07 ENCOUNTER — HOSPITAL ENCOUNTER (OUTPATIENT)
Dept: MAMMOGRAPHY | Facility: HOSPITAL | Age: 81
Discharge: HOME | End: 2021-12-07
Admitting: INTERNAL MEDICINE

## 2021-12-07 DIAGNOSIS — Z12.31 ENCOUNTER FOR SCREENING MAMMOGRAM FOR BREAST CANCER: ICD-10-CM

## 2021-12-07 PROCEDURE — 77067 SCR MAMMO BI INCL CAD: CPT

## 2021-12-07 PROCEDURE — 77063 BREAST TOMOSYNTHESIS BI: CPT

## 2021-12-15 ENCOUNTER — LAB (OUTPATIENT)
Dept: LAB | Facility: HOSPITAL | Age: 81
End: 2021-12-15

## 2021-12-15 ENCOUNTER — HOSPITAL ENCOUNTER (OUTPATIENT)
Dept: ULTRASOUND IMAGING | Facility: HOSPITAL | Age: 81
Discharge: HOME | End: 2021-12-15

## 2021-12-15 ENCOUNTER — TRANSCRIBE ORDERS (OUTPATIENT)
Dept: LAB | Facility: HOSPITAL | Age: 81
End: 2021-12-15

## 2021-12-15 DIAGNOSIS — E04.2 MULTINODULAR GOITER: ICD-10-CM

## 2021-12-15 DIAGNOSIS — E04.2 MULTINODULAR GOITER: Primary | ICD-10-CM

## 2021-12-15 DIAGNOSIS — E04.9 GOITER: ICD-10-CM

## 2021-12-15 PROCEDURE — 36415 COLL VENOUS BLD VENIPUNCTURE: CPT

## 2021-12-15 PROCEDURE — 76536 US EXAM OF HEAD AND NECK: CPT

## 2021-12-15 PROCEDURE — 84443 ASSAY THYROID STIM HORMONE: CPT

## 2021-12-15 PROCEDURE — 84439 ASSAY OF FREE THYROXINE: CPT

## 2021-12-16 LAB
T4 FREE SERPL-MCNC: 1.14 NG/DL (ref 0.93–1.7)
TSH SERPL DL<=0.05 MIU/L-ACNC: 0.55 UIU/ML (ref 0.27–4.2)

## 2022-01-10 ENCOUNTER — PREP FOR SURGERY (OUTPATIENT)
Dept: OTHER | Facility: HOSPITAL | Age: 82
End: 2022-01-10

## 2022-01-10 ENCOUNTER — OFFICE VISIT (OUTPATIENT)
Dept: GASTROENTEROLOGY | Facility: CLINIC | Age: 82
End: 2022-01-10

## 2022-01-10 VITALS
DIASTOLIC BLOOD PRESSURE: 62 MMHG | OXYGEN SATURATION: 97 % | SYSTOLIC BLOOD PRESSURE: 144 MMHG | HEIGHT: 59 IN | TEMPERATURE: 98.6 F | HEART RATE: 69 BPM | WEIGHT: 215.6 LBS | BODY MASS INDEX: 43.46 KG/M2

## 2022-01-10 DIAGNOSIS — E66.01 CLASS 3 SEVERE OBESITY DUE TO EXCESS CALORIES WITH SERIOUS COMORBIDITY AND BODY MASS INDEX (BMI) OF 40.0 TO 44.9 IN ADULT: ICD-10-CM

## 2022-01-10 DIAGNOSIS — R19.5 POSITIVE COLORECTAL CANCER SCREENING USING COLOGUARD TEST: Primary | ICD-10-CM

## 2022-01-10 DIAGNOSIS — K76.0 FATTY LIVER: ICD-10-CM

## 2022-01-10 PROBLEM — E66.813 CLASS 3 SEVERE OBESITY DUE TO EXCESS CALORIES WITH SERIOUS COMORBIDITY AND BODY MASS INDEX (BMI) OF 40.0 TO 44.9 IN ADULT: Status: ACTIVE | Noted: 2022-01-10

## 2022-01-10 PROCEDURE — 99204 OFFICE O/P NEW MOD 45 MIN: CPT | Performed by: NURSE PRACTITIONER

## 2022-01-10 RX ORDER — FLUTICASONE PROPIONATE 50 MCG
SPRAY, SUSPENSION (ML) NASAL EVERY 24 HOURS
COMMUNITY
Start: 2021-10-18 | End: 2023-01-26

## 2022-01-10 RX ORDER — FERROUS SULFATE 325(65) MG
TABLET ORAL EVERY 24 HOURS
COMMUNITY
Start: 2021-07-08 | End: 2022-02-04

## 2022-01-10 RX ORDER — SPIRONOLACTONE AND HYDROCHLOROTHIAZIDE 25; 25 MG/1; MG/1
0.75 TABLET ORAL DAILY
COMMUNITY
Start: 2021-10-11

## 2022-01-10 RX ORDER — POLYETHYLENE GLYCOL 3350, SODIUM SULFATE ANHYDROUS, SODIUM BICARBONATE, SODIUM CHLORIDE, POTASSIUM CHLORIDE 227.1; 21.5; 6.36; 5.53; .754 G/L; G/L; G/L; G/L; G/L
4 POWDER, FOR SOLUTION ORAL DAILY
Qty: 1 EACH | Refills: 0 | Status: SHIPPED | OUTPATIENT
Start: 2022-01-10 | End: 2022-01-11

## 2022-01-11 ENCOUNTER — LAB (OUTPATIENT)
Dept: LAB | Facility: HOSPITAL | Age: 82
End: 2022-01-11

## 2022-01-11 ENCOUNTER — TRANSCRIBE ORDERS (OUTPATIENT)
Dept: ADMINISTRATIVE | Facility: HOSPITAL | Age: 82
End: 2022-01-11

## 2022-01-11 DIAGNOSIS — E78.5 HYPERLIPIDEMIA, UNSPECIFIED HYPERLIPIDEMIA TYPE: ICD-10-CM

## 2022-01-11 DIAGNOSIS — M81.0 AGE RELATED OSTEOPOROSIS, UNSPECIFIED PATHOLOGICAL FRACTURE PRESENCE: ICD-10-CM

## 2022-01-11 DIAGNOSIS — I10 HYPERTENSION, ESSENTIAL: ICD-10-CM

## 2022-01-11 DIAGNOSIS — K74.60 HEPATIC CIRRHOSIS, UNSPECIFIED HEPATIC CIRRHOSIS TYPE, UNSPECIFIED WHETHER ASCITES PRESENT: ICD-10-CM

## 2022-01-11 DIAGNOSIS — E13.9 OTHER SPECIFIED DIABETES MELLITUS WITHOUT COMPLICATIONS: ICD-10-CM

## 2022-01-11 DIAGNOSIS — M51.37 OTHER INTERVERTEBRAL DISC DEGENERATION, LUMBOSACRAL REGION: ICD-10-CM

## 2022-01-11 DIAGNOSIS — Z00.00 ROUTINE GENERAL MEDICAL EXAMINATION AT A HEALTH CARE FACILITY: Primary | ICD-10-CM

## 2022-01-11 DIAGNOSIS — Z00.00 ROUTINE GENERAL MEDICAL EXAMINATION AT A HEALTH CARE FACILITY: ICD-10-CM

## 2022-01-11 LAB
25(OH)D3 SERPL-MCNC: 30.7 NG/ML (ref 30–100)
ALBUMIN SERPL-MCNC: 4.1 G/DL (ref 3.5–5.2)
ALBUMIN/GLOB SERPL: 1.5 G/DL
ALP SERPL-CCNC: 88 U/L (ref 39–117)
ALT SERPL W P-5'-P-CCNC: 30 U/L (ref 1–33)
ANION GAP SERPL CALCULATED.3IONS-SCNC: 7.2 MMOL/L (ref 5–15)
AST SERPL-CCNC: 34 U/L (ref 1–32)
BASOPHILS # BLD AUTO: 0.05 10*3/MM3 (ref 0–0.2)
BASOPHILS NFR BLD AUTO: 1 % (ref 0–1.5)
BILIRUB SERPL-MCNC: 0.4 MG/DL (ref 0–1.2)
BUN SERPL-MCNC: 20 MG/DL (ref 8–23)
BUN/CREAT SERPL: 14.7 (ref 7–25)
CALCIUM SPEC-SCNC: 9.6 MG/DL (ref 8.6–10.5)
CHLORIDE SERPL-SCNC: 103 MMOL/L (ref 98–107)
CO2 SERPL-SCNC: 28.8 MMOL/L (ref 22–29)
CREAT SERPL-MCNC: 1.36 MG/DL (ref 0.57–1)
DEPRECATED RDW RBC AUTO: 40.7 FL (ref 37–54)
EOSINOPHIL # BLD AUTO: 0.18 10*3/MM3 (ref 0–0.4)
EOSINOPHIL NFR BLD AUTO: 3.4 % (ref 0.3–6.2)
ERYTHROCYTE [DISTWIDTH] IN BLOOD BY AUTOMATED COUNT: 12.5 % (ref 12.3–15.4)
FOLATE SERPL-MCNC: 8.5 NG/ML (ref 4.78–24.2)
GFR SERPL CREATININE-BSD FRML MDRD: 37 ML/MIN/1.73
GLOBULIN UR ELPH-MCNC: 2.8 GM/DL
GLUCOSE SERPL-MCNC: 137 MG/DL (ref 65–99)
HBA1C MFR BLD: 8.61 % (ref 4.8–5.6)
HCT VFR BLD AUTO: 39.9 % (ref 34–46.6)
HGB BLD-MCNC: 13 G/DL (ref 12–15.9)
IMM GRANULOCYTES # BLD AUTO: 0.02 10*3/MM3 (ref 0–0.05)
IMM GRANULOCYTES NFR BLD AUTO: 0.4 % (ref 0–0.5)
LYMPHOCYTES # BLD AUTO: 1.25 10*3/MM3 (ref 0.7–3.1)
LYMPHOCYTES NFR BLD AUTO: 23.9 % (ref 19.6–45.3)
MCH RBC QN AUTO: 29.1 PG (ref 26.6–33)
MCHC RBC AUTO-ENTMCNC: 32.6 G/DL (ref 31.5–35.7)
MCV RBC AUTO: 89.5 FL (ref 79–97)
MONOCYTES # BLD AUTO: 0.49 10*3/MM3 (ref 0.1–0.9)
MONOCYTES NFR BLD AUTO: 9.4 % (ref 5–12)
NEUTROPHILS NFR BLD AUTO: 3.24 10*3/MM3 (ref 1.7–7)
NEUTROPHILS NFR BLD AUTO: 61.9 % (ref 42.7–76)
NRBC BLD AUTO-RTO: 0.2 /100 WBC (ref 0–0.2)
PLATELET # BLD AUTO: 200 10*3/MM3 (ref 140–450)
PMV BLD AUTO: 11.4 FL (ref 6–12)
POTASSIUM SERPL-SCNC: 4.2 MMOL/L (ref 3.5–5.2)
PROT SERPL-MCNC: 6.9 G/DL (ref 6–8.5)
RBC # BLD AUTO: 4.46 10*6/MM3 (ref 3.77–5.28)
SODIUM SERPL-SCNC: 139 MMOL/L (ref 136–145)
VIT B12 BLD-MCNC: 537 PG/ML (ref 211–946)
WBC NRBC COR # BLD: 5.23 10*3/MM3 (ref 3.4–10.8)

## 2022-01-11 PROCEDURE — 80053 COMPREHEN METABOLIC PANEL: CPT

## 2022-01-11 PROCEDURE — 82306 VITAMIN D 25 HYDROXY: CPT

## 2022-01-11 PROCEDURE — 82746 ASSAY OF FOLIC ACID SERUM: CPT

## 2022-01-11 PROCEDURE — 83036 HEMOGLOBIN GLYCOSYLATED A1C: CPT

## 2022-01-11 PROCEDURE — 36415 COLL VENOUS BLD VENIPUNCTURE: CPT

## 2022-01-11 PROCEDURE — 85025 COMPLETE CBC W/AUTO DIFF WBC: CPT

## 2022-01-11 PROCEDURE — 82607 VITAMIN B-12: CPT

## 2022-02-04 RX ORDER — METOPROLOL SUCCINATE 50 MG/1
50 TABLET, EXTENDED RELEASE ORAL DAILY
COMMUNITY

## 2022-02-04 RX ORDER — HYDRALAZINE HYDROCHLORIDE 25 MG/1
25 TABLET, FILM COATED ORAL 3 TIMES DAILY
COMMUNITY

## 2022-02-04 NOTE — PRE-PROCEDURE INSTRUCTIONS
Called patient to discuss instructions for laxative. Patient stated understanding for 2 part prep. Discussed skin prep, clear liquid diet, and pre-op medications. Patient aware she can take Gabapentin, Zyrtec, Protonix, and Metoprolol. Patient stated understanding. Patient is aware she needs to stop her Eliquis on 2/6/22 per Provider.  No other issues or concerns noted currently per patient.   Blood Glucose ordered.  Patient is vaccinated for covid-19.

## 2022-02-08 ENCOUNTER — HOSPITAL ENCOUNTER (OUTPATIENT)
Facility: HOSPITAL | Age: 82
Setting detail: HOSPITAL OUTPATIENT SURGERY
Discharge: HOME | End: 2022-02-08
Attending: INTERNAL MEDICINE | Admitting: INTERNAL MEDICINE

## 2022-02-08 ENCOUNTER — ANESTHESIA EVENT (OUTPATIENT)
Dept: GASTROENTEROLOGY | Facility: HOSPITAL | Age: 82
End: 2022-02-08

## 2022-02-08 ENCOUNTER — ANESTHESIA (OUTPATIENT)
Dept: GASTROENTEROLOGY | Facility: HOSPITAL | Age: 82
End: 2022-02-08

## 2022-02-08 VITALS
BODY MASS INDEX: 42.58 KG/M2 | RESPIRATION RATE: 18 BRPM | DIASTOLIC BLOOD PRESSURE: 61 MMHG | TEMPERATURE: 97.1 F | WEIGHT: 211.2 LBS | HEIGHT: 59 IN | SYSTOLIC BLOOD PRESSURE: 138 MMHG | OXYGEN SATURATION: 98 % | HEART RATE: 61 BPM

## 2022-02-08 DIAGNOSIS — R19.5 POSITIVE COLORECTAL CANCER SCREENING USING COLOGUARD TEST: ICD-10-CM

## 2022-02-08 DIAGNOSIS — E66.01 CLASS 3 SEVERE OBESITY DUE TO EXCESS CALORIES WITH SERIOUS COMORBIDITY AND BODY MASS INDEX (BMI) OF 40.0 TO 44.9 IN ADULT: ICD-10-CM

## 2022-02-08 DIAGNOSIS — K76.0 FATTY LIVER: ICD-10-CM

## 2022-02-08 LAB
GLUCOSE BLDC GLUCOMTR-MCNC: 179 MG/DL (ref 70–130)
GLUCOSE BLDC GLUCOMTR-MCNC: 179 MG/DL (ref 70–99)

## 2022-02-08 PROCEDURE — 45385 COLONOSCOPY W/LESION REMOVAL: CPT | Performed by: INTERNAL MEDICINE

## 2022-02-08 PROCEDURE — C1889 IMPLANT/INSERT DEVICE, NOC: HCPCS | Performed by: INTERNAL MEDICINE

## 2022-02-08 PROCEDURE — 45390 COLONOSCOPY W/RESECTION: CPT | Performed by: INTERNAL MEDICINE

## 2022-02-08 PROCEDURE — 43236 UPPR GI SCOPE W/SUBMUC INJ: CPT | Performed by: INTERNAL MEDICINE

## 2022-02-08 PROCEDURE — 43239 EGD BIOPSY SINGLE/MULTIPLE: CPT | Performed by: INTERNAL MEDICINE

## 2022-02-08 PROCEDURE — 82962 GLUCOSE BLOOD TEST: CPT

## 2022-02-08 PROCEDURE — 43251 EGD REMOVE LESION SNARE: CPT | Performed by: INTERNAL MEDICINE

## 2022-02-08 PROCEDURE — 88305 TISSUE EXAM BY PATHOLOGIST: CPT | Performed by: INTERNAL MEDICINE

## 2022-02-08 PROCEDURE — 25010000002 PROPOFOL 10 MG/ML EMULSION: Performed by: NURSE ANESTHETIST, CERTIFIED REGISTERED

## 2022-02-08 DEVICE — DEV CLIP HEMO RESOLUTION360/ULTR 235CM 17MM STRL: Type: IMPLANTABLE DEVICE | Site: STOMACH | Status: FUNCTIONAL

## 2022-02-08 RX ORDER — LIDOCAINE HYDROCHLORIDE 20 MG/ML
INJECTION, SOLUTION INFILTRATION; PERINEURAL AS NEEDED
Status: DISCONTINUED | OUTPATIENT
Start: 2022-02-08 | End: 2022-02-08 | Stop reason: SURG

## 2022-02-08 RX ORDER — SODIUM CHLORIDE, SODIUM LACTATE, POTASSIUM CHLORIDE, CALCIUM CHLORIDE 600; 310; 30; 20 MG/100ML; MG/100ML; MG/100ML; MG/100ML
30 INJECTION, SOLUTION INTRAVENOUS CONTINUOUS
Status: DISCONTINUED | OUTPATIENT
Start: 2022-02-08 | End: 2022-02-08 | Stop reason: HOSPADM

## 2022-02-08 RX ADMIN — SODIUM CHLORIDE, POTASSIUM CHLORIDE, SODIUM LACTATE AND CALCIUM CHLORIDE: 600; 310; 30; 20 INJECTION, SOLUTION INTRAVENOUS at 11:21

## 2022-02-08 RX ADMIN — SODIUM CHLORIDE, POTASSIUM CHLORIDE, SODIUM LACTATE AND CALCIUM CHLORIDE: 600; 310; 30; 20 INJECTION, SOLUTION INTRAVENOUS at 10:19

## 2022-02-08 RX ADMIN — LIDOCAINE HYDROCHLORIDE 50 MG: 20 INJECTION, SOLUTION INFILTRATION; PERINEURAL at 10:22

## 2022-02-08 RX ADMIN — PROPOFOL 250 MCG/KG/MIN: 10 INJECTION, EMULSION INTRAVENOUS at 10:22

## 2022-02-08 NOTE — DISCHARGE INSTRUCTIONS
Colon Polyps    Colon polyps are tissue growths inside the colon, which is part of the large intestine. They are one of the types of polyps that can grow in the body. A polyp may be a round bump or a mushroom-shaped growth. You could have one polyp or more than one.  Most colon polyps are noncancerous (benign). However, some colon polyps can become cancerous over time. Finding and removing the polyps early can help prevent this.  What are the causes?  The exact cause of colon polyps is not known.  What increases the risk?  The following factors may make you more likely to develop this condition:  · Having a family history of colorectal cancer or colon polyps.  · Being older than 45 years of age.  · Being younger than 45 years of age and having a significant family history of colorectal cancer or colon polyps or a genetic condition that puts you at higher risk of getting colon polyps.  · Having inflammatory bowel disease, such as ulcerative colitis or Crohn's disease.  · Having certain conditions passed from parent to child (hereditary conditions), such as:  ? Familial adenomatous polyposis (FAP).  ? Murcia syndrome.  ? Turcot syndrome.  ? Peutz-Jeghers syndrome.  ? MUTYH-associated polyposis (MAP).  · Being overweight.  · Certain lifestyle factors. These include smoking cigarettes, drinking too much alcohol, not getting enough exercise, and eating a diet that is high in fat and red meat and low in fiber.  · Having had childhood cancer that was treated with radiation of the abdomen.  What are the signs or symptoms?  Many times, there are no symptoms.  If you have symptoms, they may include:  · Blood coming from the rectum during a bowel movement.  · Blood in the stool (feces). The blood may be bright red or very dark in color.  · Pain in the abdomen.  · A change in bowel habits, such as constipation or diarrhea.  How is this diagnosed?  This condition is diagnosed with a colonoscopy. This is a procedure in which a  lighted, flexible scope is inserted into the opening between the buttocks (anus) and then passed into the colon to examine the area. Polyps are sometimes found when a colonoscopy is done as part of routine cancer screening tests.  How is this treated?  This condition is treated by removing any polyps that are found. Most polyps can be removed during a colonoscopy. Those polyps will then be tested for cancer. Additional treatment may be needed depending on the results of testing.  Follow these instructions at home:  Eating and drinking    · Eat foods that are high in fiber, such as fruits, vegetables, and whole grains.  · Eat foods that are high in calcium and vitamin D, such as milk, cheese, yogurt, eggs, liver, fish, and broccoli.  · Limit foods that are high in fat, such as fried foods and desserts.  · Limit the amount of red meat, precooked or cured meat, or other processed meat that you eat, such as hot dogs, sausages, gonsalves, or meat loaves.  · Limit sugary drinks.    Lifestyle  · Maintain a healthy weight, or lose weight if recommended by your health care provider.  · Exercise every day or as told by your health care provider.  · Do not use any products that contain nicotine or tobacco, such as cigarettes, e-cigarettes, and chewing tobacco. If you need help quitting, ask your health care provider.  · Do not drink alcohol if:  ? Your health care provider tells you not to drink.  ? You are pregnant, may be pregnant, or are planning to become pregnant.  · If you drink alcohol:  ? Limit how much you use to:  § 0-1 drink a day for women.  § 0-2 drinks a day for men.  ? Know how much alcohol is in your drink. In the [place] one drink equals one 12 oz bottle of beer (355 mL), one 5 oz glass of wine (148 mL), or one 1½ oz glass of hard liquor (44 mL).  General instructions  · Take over-the-counter and prescription medicines only as told by your health care provider.  · Keep all follow-up visits. This is important. This  includes having regularly scheduled colonoscopies. Talk to your health care provider about when you need a colonoscopy.  Contact a health care provider if:  · You have new or worsening bleeding during a bowel movement.  · You have new or increased blood in your stool.  · You have a change in bowel habits.  · You lose weight for no known reason.  Summary  · Colon polyps are tissue growths inside the colon, which is part of the large intestine. They are one type of polyp that can grow in the body.  · Most colon polyps are noncancerous (benign), but some can become cancerous over time.  · This condition is diagnosed with a colonoscopy.  · This condition is treated by removing any polyps that are found. Most polyps can be removed during a colonoscopy.  This information is not intended to replace advice given to you by your health care provider. Make sure you discuss any questions you have with your health care provider.  Document Revised: 04/07/2021 Document Reviewed: 04/07/2021  [employer] Patient Education © 2021 [employer]  Hiatal Hernia    A hiatal hernia occurs when part of the stomach slides above the muscle that separates the abdomen from the chest (diaphragm). A person can be born with a hiatal hernia (congenital), or it may develop over time. In almost all cases of hiatal hernia, only the top part of the stomach pushes through the diaphragm.  Many people have a hiatal hernia with no symptoms. The larger the hernia, the more likely it is that you will have symptoms. In some cases, a hiatal hernia allows stomach acid to flow back into the tube that carries food from your mouth to your stomach (esophagus). This may cause heartburn symptoms. Severe heartburn symptoms may mean that you have developed a condition called gastroesophageal reflux disease (GERD).  What are the causes?  This condition is caused by a weakness in the opening (hiatus) where the esophagus passes through the diaphragm to attach to the upper  part of the stomach. A person may be born with a weakness in the hiatus, or a weakness can develop over time.  What increases the risk?  This condition is more likely to develop in:  · Older people. Age is a major risk factor for a hiatal hernia, especially if you are over the age of 50.  · Pregnant women.  · People who are overweight.  · People who have frequent constipation.  What are the signs or symptoms?  Symptoms of this condition usually develop in the form of GERD symptoms. Symptoms include:  · Heartburn.  · Belching.  · Indigestion.  · Trouble swallowing.  · Coughing or wheezing.  · Sore throat.  · Hoarseness.  · Chest pain.  · Nausea and vomiting.  How is this diagnosed?  This condition may be diagnosed during testing for GERD. Tests that may be done include:  · X-rays of your stomach or chest.  · An upper gastrointestinal (GI) series. This is an X-ray exam of your GI tract that is taken after you swallow a chalky liquid that shows up clearly on the X-ray.  · Endoscopy. This is a procedure to look into your stomach using a thin, flexible tube that has a tiny camera and light on the end of it.  How is this treated?  This condition may be treated by:  · Dietary and lifestyle changes to help reduce GERD symptoms.  · Medicines. These may include:  ? Over-the-counter antacids.  ? Medicines that make your stomach empty more quickly.  ? Medicines that block the production of stomach acid (H2 blockers).  ? Stronger medicines to reduce stomach acid (proton pump inhibitors).  · Surgery to repair the hernia, if other treatments are not helping.  If you have no symptoms, you may not need treatment.  Follow these instructions at home:  Lifestyle and activity  · Do not use any products that contain nicotine or tobacco, such as cigarettes and e-cigarettes. If you need help quitting, ask your health care provider.  · Try to achieve and maintain a healthy body weight.  · Avoid putting pressure on your abdomen. Anything  that puts pressure on your abdomen increases the amount of acid that may be pushed up into your esophagus.  ? Avoid bending over, especially after eating.  ? Raise the head of your bed by putting blocks under the legs. This keeps your head and esophagus higher than your stomach.  ? Do not wear tight clothing around your chest or stomach.  ? Try not to strain when having a bowel movement, when urinating, or when lifting heavy objects.  Eating and drinking  · Avoid foods that can worsen GERD symptoms. These may include:  ? Fatty foods, like fried foods.  ? Citrus fruits, like oranges or lemon.  ? Other foods and drinks that contain acid, like orange juice or tomatoes.  ? Spicy food.  ? Chocolate.  · Eat frequent small meals instead of three large meals a day. This helps prevent your stomach from getting too full.  ? Eat slowly.  ? Do not lie down right after eating.  ? Do not eat 1-2 hours before bed.  · Do not drink beverages with caffeine. These include cola, coffee, cocoa, and tea.  · Do not drink alcohol.  General instructions  · Take over-the-counter and prescription medicines only as told by your health care provider.  · Keep all follow-up visits as told by your health care provider. This is important.  Contact a health care provider if:  · Your symptoms are not controlled with medicines or lifestyle changes.  · You are having trouble swallowing.  · You have coughing or wheezing that will not go away.  Get help right away if:  · Your pain is getting worse.  · Your pain spreads to your arms, neck, jaw, teeth, or back.  · You have shortness of breath.  · You sweat for no reason.  · You feel sick to your stomach (nauseous) or you vomit.  · You vomit blood.  · You have bright red blood in your stools.  · You have black, tarry stools.  This information is not intended to replace advice given to you by your health care provider. Make sure you discuss any questions you have with your health care provider.  Document  Revised: 11/30/2018 Document Reviewed: 07/23/2018  [employer] Patient Education © 2021 [employer]  Stomach Polyps  A stomach polyp, also called a gastric polyp, is a growth on the lining of the stomach. A stomach polyp may be found by chance when you are being examined for another reason. Most polyps are not dangerous, but some can be harmful because of their size, location, or type. Polyps that can become harmful include:  · Large polyps. These can turn into open sores called ulcers. Ulcers can lead to stomach bleeding.  · Gastric outlet obstructions. These polyps block food from moving from the stomach to the small intestine.  · Adenomas. These polyps can become cancerous.  What are the causes?  Stomach polyps form when the lining of the stomach gets inflamed or damaged. Stomach inflammation and damage may be caused by:  · A long-lasting stomach condition, such as gastritis.  · Taking certain medicines to reduce stomach acid over a long period of time.  · An inherited condition called familial adenomatous polyposis.  · An infection caused by a type of bacteria called Helicobacter pylori, or H. pylori.  What are the signs or symptoms?  Usually, this condition does not cause any symptoms. If you do have symptoms, they may include:  · Pain or tenderness in the abdomen.  · Nausea.  · Blood in the stool.  · Anemia.  · Trouble eating or swallowing.  How is this diagnosed?  This condition may be diagnosed with:  · Your medical history.  · A medical procedure called an upper endoscopy. This procedure uses a flexible tube with a tiny camera on the end to look inside your stomach.  · A lab test in which a part of the polyp is examined. This test is done by removing a polyp tissue sample during an endoscopy to look at it under a microscope (biopsy).  How is this treated?  Treatment depends on the type, location, and size of the polyps. Treatment may include:  · Checking the polyps regularly with an endoscopy.  · Removing  the polyps with an endoscopy procedure. This may be done if the polyps are harmful or can become harmful. Removing a polyp often prevents problems from developing.  · Removing the polyps with a surgery called a partial gastrectomy. This may be done in rare cases to remove very large polyps.  · Treating the underlying condition that caused the polyps.  Follow these instructions at home:    · Take over-the-counter and prescription medicines only as told by your health care provider.  · Avoid foods and drinks that make your symptoms worse.  · Make sure that all the food you eat is properly cooked and stored. This will help prevent bacterial infection, such as H. pylori infection.  · Do not drink alcohol if your health care provider tells you not to drink.  · Do not use any products that contain nicotine or tobacco, such as cigarettes, e-cigarettes, and chewing tobacco. If you need help quitting, ask your health care provider.  · Keep all follow-up visits as told by your health care provider. This is important.  Contact a health care provider if:  · You develop new symptoms such as:  ? Loss of appetite.  ? Feeling full after eating a small meal.  ? Vomiting.  ? Losing weight without trying.  ? Fatigue.  · Your symptoms get worse.  · You have questions about the tests or procedures you may need.  Get help right away if:  · You vomit blood.  · You have severe abdominal pain.  · You cannot eat or drink.  · You have blood in your stool.  Summary  · A stomach polyp, also called a gastric polyp, is a growth on the lining of the stomach. Most polyps are not dangerous, but some can be harmful because of their size, location, or type.  · Usually, this condition does not cause any symptoms. If you do have symptoms, you may have abdominal pain, nausea, trouble eating or swallowing, or blood in your stool.  · This condition may be diagnosed with a medical procedure called an upper endoscopy. A lab test in which a part of the polyp  is examined may also be done. This test is done by removing a polyp tissue sample during an endoscopy to look at it under a microscope (biopsy).  · Keep all follow-up visits as told by your health care provider. This is important.  This information is not intended to replace advice given to you by your health care provider. Make sure you discuss any questions you have with your health care provider.  Document Revised: 01/22/2021 Document Reviewed: 01/22/2021  [employer] Patient Education © 2021 [employer]  Monitored Anesthesia Care, Care After  This sheet gives you information about how to care for yourself after your procedure. Your health care provider may also give you more specific instructions. If you have problems or questions, contact your health care provider.  What can I expect after the procedure?  After the procedure, it is common to have:  · Tiredness.  · Forgetfulness about what happened after the procedure.  · Impaired judgment for important decisions.  · Nausea or vomiting.  · Some difficulty with balance.  Follow these instructions at home:  For the time period you were told by your health care provider:         · Rest as needed.  · Do not participate in activities where you could fall or become injured.  · Do not drive or use machinery.  · Do not drink alcohol.  · Do not take sleeping pills or medicines that cause drowsiness.  · Do not make important decisions or sign legal documents.  · Do not take care of children on your own.  Eating and drinking  · Follow the diet that is recommended by your health care provider.  · Drink enough fluid to keep your urine pale yellow.  · If you vomit:  ? Drink water, juice, or soup when you can drink without vomiting.  ? Make sure you have little or no nausea before eating solid foods.  General instructions  · Have a responsible adult stay with you for the time you are told. It is important to have someone help care for you until you are awake and alert.  · Take  over-the-counter and prescription medicines only as told by your health care provider.  · If you have sleep apnea, surgery and certain medicines can increase your risk for breathing problems. Follow instructions from your health care provider about wearing your sleep device:  ? Anytime you are sleeping, including during daytime naps.  ? While taking prescription pain medicines, sleeping medicines, or medicines that make you drowsy.  · Avoid smoking.  · Keep all follow-up visits as told by your health care provider. This is important.  Contact a health care provider if:  · You keep feeling nauseous or you keep vomiting.  · You feel light-headed.  · You are still sleepy or having trouble with balance after 24 hours.  · You develop a rash.  · You have a fever.  · You have redness or swelling around the IV site.  Get help right away if:  · You have trouble breathing.  · You have new-onset confusion at home.  Summary  · For several hours after your procedure, you may feel tired. You may also be forgetful and have poor judgment.  · Have a responsible adult stay with you for the time you are told. It is important to have someone help care for you until you are awake and alert.  · Rest as told. Do not drive or operate machinery. Do not drink alcohol or take sleeping pills.  · Get help right away if you have trouble breathing, or if you suddenly become confused.  This information is not intended to replace advice given to you by your health care provider. Make sure you discuss any questions you have with your health care provider.  Document Revised: 04/23/2021 Document Reviewed: 11/19/2020  [employer] Patient Education © 2021 [employer]  Upper Endoscopy, Adult, Care After  This sheet gives you information about how to care for yourself after your procedure. Your health care provider may also give you more specific instructions. If you have problems or questions, contact your health care provider.  What can I expect after  the procedure?  After the procedure, it is common to have:  · A sore throat.  · Mild stomach pain or discomfort.  · Bloating.  · Nausea.  Follow these instructions at home:    · Follow instructions from your health care provider about what to eat or drink after your procedure.  · Return to your normal activities as told by your health care provider. Ask your health care provider what activities are safe for you.  · Take over-the-counter and prescription medicines only as told by your health care provider.  · If you were given a sedative during the procedure, it can affect you for several hours. Do not drive or operate machinery until your health care provider says that it is safe.  · Keep all follow-up visits as told by your health care provider. This is important.  Contact a health care provider if you have:  · A sore throat that lasts longer than one day.  · Trouble swallowing.  Get help right away if:  · You vomit blood or your vomit looks like coffee grounds.  · You have:  ? A fever.  ? Bloody, black, or tarry stools.  ? A severe sore throat or you cannot swallow.  ? Difficulty breathing.  ? Severe pain in your chest or abdomen.  Summary  · After the procedure, it is common to have a sore throat, mild stomach discomfort, bloating, and nausea.  · If you were given a sedative during the procedure, it can affect you for several hours. Do not drive or operate machinery until your health care provider says that it is safe.  · Follow instructions from your health care provider about what to eat or drink after your procedure.  · Return to your normal activities as told by your health care provider.  This information is not intended to replace advice given to you by your health care provider. Make sure you discuss any questions you have with your health care provider.  Document Revised: 12/15/2020 Document Reviewed: 05/20/2019  [employer] Patient Education © 2021 [employer]  Colonoscopy, Adult, Care After  This sheet  gives you information about how to care for yourself after your procedure. Your health care provider may also give you more specific instructions. If you have problems or questions, contact your health care provider.  What can I expect after the procedure?  After the procedure, it is common to have:  · A small amount of blood in your stool for 24 hours after the procedure.  · Some gas.  · Mild cramping or bloating of your abdomen.  Follow these instructions at home:  Eating and drinking    · Drink enough fluid to keep your urine pale yellow.  · Follow instructions from your health care provider about eating or drinking restrictions.  · Resume your normal diet as instructed by your health care provider. Avoid heavy or fried foods that are hard to digest.    Activity  · Rest as told by your health care provider.  · Avoid sitting for a long time without moving. Get up to take short walks every 1-2 hours. This is important to improve blood flow and breathing. Ask for help if you feel weak or unsteady.  · Return to your normal activities as told by your health care provider. Ask your health care provider what activities are safe for you.  Managing cramping and bloating    · Try walking around when you have cramps or feel bloated.  · Apply heat to your abdomen as told by your health care provider. Use the heat source that your health care provider recommends, such as a moist heat pack or a heating pad.  ? Place a towel between your skin and the heat source.  ? Leave the heat on for 20-30 minutes.  ? Remove the heat if your skin turns bright red. This is especially important if you are unable to feel pain, heat, or cold. You may have a greater risk of getting burned.    General instructions  · If you were given a sedative during the procedure, it can affect you for several hours. Do not drive or operate machinery until your health care provider says that it is safe.  · For the first 24 hours after the procedure:  ? Do not  sign important documents.  ? Do not drink alcohol.  ? Do your regular daily activities at a slower pace than normal.  ? Eat soft foods that are easy to digest.  · Take over-the-counter and prescription medicines only as told by your health care provider.  · Keep all follow-up visits as told by your health care provider. This is important.  Contact a health care provider if:  · You have blood in your stool 2-3 days after the procedure.  Get help right away if you have:  · More than a small spotting of blood in your stool.  · Large blood clots in your stool.  · Swelling of your abdomen.  · Nausea or vomiting.  · A fever.  · Increasing pain in your abdomen that is not relieved with medicine.  Summary  · After the procedure, it is common to have a small amount of blood in your stool. You may also have mild cramping and bloating of your abdomen.  · If you were given a sedative during the procedure, it can affect you for several hours. Do not drive or operate machinery until your health care provider says that it is safe.  · Get help right away if you have a lot of blood in your stool, nausea or vomiting, a fever, or increased pain in your abdomen.  This information is not intended to replace advice given to you by your health care provider. Make sure you discuss any questions you have with your health care provider.  Document Revised: 12/11/2020 Document Reviewed: 07/13/2020  [employer] Patient Education © 2021 [employer]

## 2022-02-08 NOTE — ANESTHESIA PREPROCEDURE EVALUATION
Anesthesia Evaluation     Patient summary reviewed and Nursing notes reviewed   no history of anesthetic complications:  NPO Solid Status: > 8 hours  NPO Liquid Status: > 2 hours           Airway   Mallampati: III  TM distance: >3 FB  Neck ROM: full  No difficulty expected  Dental      Pulmonary - normal exam    breath sounds clear to auscultation  (+) sleep apnea,   Cardiovascular   Exercise tolerance: poor (<4 METS)    Beta blocker given within 24 hours of surgery and Beta blocker not taken-may be given intraoperatively  Rhythm: regular  Rate: normal    (+) hypertension, dysrhythmias Atrial Fib, murmur,       Neuro/Psych- negative ROS  GI/Hepatic/Renal/Endo    (+) obesity,  GERD,  liver disease, renal disease, diabetes mellitus type 2,     Musculoskeletal (-) negative ROS    Abdominal    Substance History - negative use     OB/GYN negative ob/gyn ROS         Other      history of cancer           Previous ECG: no previous ECG available   Rhythm: sinus bradycardia   Ectopy: bigeminy   Rate: normal   QRS axis: left   Conduction: right bundle branch block   Q waves: II, III and aVF   Clinical impression: abnormal ECG          Anesthesia Plan    ASA 3     general   (Total IV Anesthesia    Patient understands anesthesia not responsible for dental damage.    Murmur on exam, suspicious for aortic stenosis, pt states she has had several echos in past and they have been normal, when asked pt states she has had murmur for a very long time  )  intravenous induction     Anesthetic plan, all risks, benefits, and alternatives have been provided, discussed and informed consent has been obtained with: patient.    Plan discussed with CRNA.        CODE STATUS:

## 2022-02-08 NOTE — ANESTHESIA POSTPROCEDURE EVALUATION
Patient: Bella Ritter    Procedure Summary     Date: 02/08/22 Room / Location: [facility] ENDOSCOPY 4 / [facility]COPY    Anesthesia Start: 1019 Anesthesia Stop: 1134    Procedures:       ESOPHAGOGASTRODUODENOSCOPY WITH HOT SNARE POLYPECTOMY, BIOPSIES, ORISE INJECTION AND CLIP APPLICATION X1 (N/A )      COLONOSCOPY WITH HOT SNARE PIECE MEAL POLYPECTOMY, ORISE INJECTION (N/A ) Diagnosis:       Positive colorectal cancer screening using Cologuard test      Fatty liver      Class 3 severe obesity due to excess calories with serious comorbidity and body mass index (BMI) of 40.0 to 44.9 in adult (HCC)      (Positive colorectal cancer screening using Cologuard test [R19.5])      (Fatty liver [K76.0])      (Class 3 severe obesity due to excess calories with serious comorbidity and body mass index (BMI) of 40.0 to 44.9 in adult (HCC) [E66.01, Z68.41])    Surgeons: Pierre Delong MD Provider: Konrad Jiang MD    Anesthesia Type: general ASA Status: 3          Anesthesia Type: general    Vitals  Vitals Value Taken Time   BP     Temp     Pulse 61 02/08/22 1136   Resp     SpO2 96 % 02/08/22 1136   Vitals shown include unvalidated device data.        Post Anesthesia Care and Evaluation    Patient location during evaluation: bedside  Patient participation: complete - patient participated  Level of consciousness: awake  Pain score: 0  Pain management: adequate  Airway patency: patent  Anesthetic complications: No anesthetic complications  PONV Status: none  Cardiovascular status: acceptable and stable  Respiratory status: acceptable and room air  Hydration status: acceptable    Comments: An Anesthesiologist personally participated in the most demanding procedures (including induction and emergence if applicable) in the anesthesia plan, monitored the course of anesthesia administration at frequent intervals and remained physically present and available for immediate diagnosis and treatment of emergencies.

## 2022-02-08 NOTE — H&P
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)    • Diabetes mellitus (HCC)    • GERD (gastroesophageal reflux disease)    • Hypertension    • Kidney stone    • Neuropathy    • Sleep apnea        Past Surgical History:  Past Surgical History:   Procedure Laterality Date   •  SECTION     • EYE SURGERY Bilateral     cataracts   • HERNIA REPAIR      inguinal   • HYSTERECTOMY     • KIDNEY STONE SURGERY     • MASTECTOMY Bilateral    • PACEMAKER IMPLANTATION         Family History:  Family History   Problem Relation Age of Onset   • Colon polyps Father    • Colon polyps Other    • Malig Hyperthermia Neg Hx        Social History:   reports that she has never smoked. She has never used smokeless tobacco. She reports that she does not drink alcohol and does not use drugs.    Medications:   Medications Prior to Admission   Medication Sig Dispense Refill Last Dose   • apixaban (ELIQUIS) 5 MG tablet tablet Take 5 mg by mouth 2 (Two) Times a Day.   2022 at Unknown time   • Cetirizine HCl 10 MG capsule Take 1 capsule by mouth Daily.   Past Week at Unknown time   • fluticasone (Flonase) 50 MCG/ACT nasal spray Daily.   Past Week at Unknown time   • gabapentin (NEURONTIN) 300 MG capsule Take 300 mg by mouth Daily.   Past Week at Unknown time   • hydrALAZINE (APRESOLINE) 25 MG tablet Take 25 mg by mouth 3 (Three) Times a Day.   Past Week at Unknown time   • insulin aspart (novoLOG) 100 UNIT/ML injection Inject 20 Units under the skin into the appropriate area as directed 3 (Three) Times a Day.   2022 at Unknown time   • insulin glargine (LANTUS, SEMGLEE) 100 UNIT/ML injection Inject 50 Units under the skin into the appropriate  "area as directed Daily.   2/5/2022 at Unknown time   • metoprolol succinate XL (TOPROL-XL) 50 MG 24 hr tablet Take 50 mg by mouth Daily.   2/6/2022 at Unknown time   • olmesartan (BENICAR) 40 MG tablet Take 40 mg by mouth Daily.   Past Week at Unknown time   • pantoprazole (PROTONIX) 20 MG EC tablet Take 40 mg by mouth Daily.   Past Week at Unknown time   • spironolactone-hydrochlorothiazide (ALDACTAZIDE) 25-25 MG tablet Take 0.5 tablets by mouth Daily.   2/5/2022 at Unknown time       Allergies:  Aricept [donepezil], Lisinopril, Nsaids, and Pseudoephedrine        Objective     Blood pressure 174/70, pulse 67, temperature 97.1 °F (36.2 °C), temperature source Temporal, resp. rate 16, height 149.9 cm (59\"), weight 95.8 kg (211 lb 3.2 oz), SpO2 97 %.    Physical Exam   Constitutional: Pt is oriented to person, place, and time and well-developed, well-nourished, and in no distress.   Mouth/Throat: Oropharynx is clear and moist.   Neck: Normal range of motion.   Cardiovascular: Normal rate, regular rhythm and normal heart sounds.    Pulmonary/Chest: Effort normal and breath sounds normal.   Abdominal: Soft. Nontender  Skin: Skin is warm and dry.   Psychiatric: Mood, memory, affect and judgment normal.     Assessment/Plan     Diagnosis:  Heartburn and screening    Anticipated Surgical Procedure:  EGD and colonoscopy    The risks, benefits, and alternatives of this procedure have been discussed with the patient or the responsible party- the patient understands and agrees to proceed.            "

## 2022-02-09 ENCOUNTER — PREP FOR SURGERY (OUTPATIENT)
Dept: OTHER | Facility: HOSPITAL | Age: 82
End: 2022-02-09

## 2022-02-09 DIAGNOSIS — Z86.010 ENCOUNTER FOR COLONOSCOPY FOLLOWING COLON POLYP REMOVAL: Primary | ICD-10-CM

## 2022-02-09 DIAGNOSIS — Z09 ENCOUNTER FOR COLONOSCOPY FOLLOWING COLON POLYP REMOVAL: Primary | ICD-10-CM

## 2022-02-09 LAB
CYTO UR: NORMAL
LAB AP CASE REPORT: NORMAL
LAB AP CLINICAL INFORMATION: NORMAL
PATH REPORT.FINAL DX SPEC: NORMAL
PATH REPORT.GROSS SPEC: NORMAL

## 2022-02-14 ENCOUNTER — TELEPHONE (OUTPATIENT)
Dept: GASTROENTEROLOGY | Facility: CLINIC | Age: 82
End: 2022-02-14

## 2022-02-14 PROBLEM — Z86.010 ENCOUNTER FOR COLONOSCOPY FOLLOWING COLON POLYP REMOVAL: Status: ACTIVE | Noted: 2022-02-14

## 2022-02-14 PROBLEM — Z86.0100 ENCOUNTER FOR COLONOSCOPY FOLLOWING COLON POLYP REMOVAL: Status: ACTIVE | Noted: 2022-02-14

## 2022-02-14 PROBLEM — Z12.11 ENCOUNTER FOR COLONOSCOPY FOLLOWING COLON POLYP REMOVAL: Status: ACTIVE | Noted: 2022-02-14

## 2022-02-14 PROBLEM — Z09 ENCOUNTER FOR COLONOSCOPY FOLLOWING COLON POLYP REMOVAL: Status: ACTIVE | Noted: 2022-02-14

## 2022-02-14 NOTE — TELEPHONE ENCOUNTER
Colonoscopy scheduled for 05/23/2022 arrival time 0930. Educated and mailed pt on Clear Liquid diet, Bowel Prep, and Colonoscopy instructions mailed to pt.     Pt takes eliquis 5mg and is managed by Dr. Cornel Reyes with [facility]. Letter faxed sent to Dr. Cornel Reyes.

## 2022-02-14 NOTE — TELEPHONE ENCOUNTER
----- Message from Pierre Delong MD sent at 2/9/2022  4:39 PM EST -----  Needs repeat Colonoscopy in 3-4 months (May or June) for large polyp. Will need blood thinner clearance (Eliquis). Case Request created and prep sent to pt's pharmacy.

## 2022-02-15 ENCOUNTER — TELEPHONE (OUTPATIENT)
Dept: GASTROENTEROLOGY | Facility: CLINIC | Age: 82
End: 2022-02-15

## 2022-02-15 NOTE — TELEPHONE ENCOUNTER
Pt called and requested that T. Richardson call her daughter in law,   Ly Ritter, APRN at [phone] to explain the need for the second colonoscopy so that the patient would have a better understanding. The patient stated she understood she had a polyp but not sure why a repeat colonoscopy would be needed. -Ly Ritter has been added to Verbal release form.

## 2022-02-23 ENCOUNTER — TRANSCRIBE ORDERS (OUTPATIENT)
Dept: ADMINISTRATIVE | Facility: HOSPITAL | Age: 82
End: 2022-02-23

## 2022-02-23 DIAGNOSIS — K74.60 CIRRHOSIS OF LIVER WITHOUT ASCITES, UNSPECIFIED HEPATIC CIRRHOSIS TYPE: Primary | ICD-10-CM

## 2022-03-10 ENCOUNTER — TELEPHONE (OUTPATIENT)
Dept: GASTROENTEROLOGY | Facility: CLINIC | Age: 82
End: 2022-03-10

## 2022-03-10 NOTE — TELEPHONE ENCOUNTER
I have tried multiple times to reach patient, but I get a busy-signal each time. Dr. Delong needs to be out of office on a day that she is scheduled for a Colonoscopy. Patient needs to be rescheduled.

## 2022-03-17 ENCOUNTER — TRANSCRIBE ORDERS (OUTPATIENT)
Dept: LAB | Facility: HOSPITAL | Age: 82
End: 2022-03-17

## 2022-03-17 ENCOUNTER — LAB (OUTPATIENT)
Dept: LAB | Facility: HOSPITAL | Age: 82
End: 2022-03-17

## 2022-03-17 DIAGNOSIS — N18.30 STAGE 3 CHRONIC KIDNEY DISEASE, UNSPECIFIED WHETHER STAGE 3A OR 3B CKD: ICD-10-CM

## 2022-03-17 DIAGNOSIS — N18.30 STAGE 3 CHRONIC KIDNEY DISEASE, UNSPECIFIED WHETHER STAGE 3A OR 3B CKD: Primary | ICD-10-CM

## 2022-03-17 LAB
ALBUMIN SERPL-MCNC: 3.7 G/DL (ref 3.5–5.2)
ANION GAP SERPL CALCULATED.3IONS-SCNC: 10 MMOL/L (ref 5–15)
BACTERIA UR QL AUTO: ABNORMAL /HPF
BASOPHILS # BLD AUTO: 0.03 10*3/MM3 (ref 0–0.2)
BASOPHILS NFR BLD AUTO: 0.6 % (ref 0–1.5)
BILIRUB UR QL STRIP: NEGATIVE
BUN SERPL-MCNC: 18 MG/DL (ref 8–23)
BUN/CREAT SERPL: 13.2 (ref 7–25)
CALCIUM SPEC-SCNC: 9.2 MG/DL (ref 8.6–10.5)
CHLORIDE SERPL-SCNC: 106 MMOL/L (ref 98–107)
CLARITY UR: CLEAR
CO2 SERPL-SCNC: 25 MMOL/L (ref 22–29)
COLOR UR: YELLOW
CREAT SERPL-MCNC: 1.36 MG/DL (ref 0.57–1)
CREAT UR-MCNC: 131.7 MG/DL
DEPRECATED RDW RBC AUTO: 41.6 FL (ref 37–54)
EGFRCR SERPLBLD CKD-EPI 2021: 38.7 ML/MIN/1.73
EOSINOPHIL # BLD AUTO: 0.21 10*3/MM3 (ref 0–0.4)
EOSINOPHIL NFR BLD AUTO: 4.1 % (ref 0.3–6.2)
ERYTHROCYTE [DISTWIDTH] IN BLOOD BY AUTOMATED COUNT: 12.6 % (ref 12.3–15.4)
FERRITIN SERPL-MCNC: 25.5 NG/ML (ref 13–150)
GLUCOSE SERPL-MCNC: 135 MG/DL (ref 65–99)
GLUCOSE UR STRIP-MCNC: NEGATIVE MG/DL
HCT VFR BLD AUTO: 39.4 % (ref 34–46.6)
HGB BLD-MCNC: 12.8 G/DL (ref 12–15.9)
HGB UR QL STRIP.AUTO: NEGATIVE
HYALINE CASTS UR QL AUTO: ABNORMAL /LPF
IMM GRANULOCYTES # BLD AUTO: 0.02 10*3/MM3 (ref 0–0.05)
IMM GRANULOCYTES NFR BLD AUTO: 0.4 % (ref 0–0.5)
IRON 24H UR-MRATE: 57 MCG/DL (ref 37–145)
IRON SATN MFR SERPL: 13 % (ref 20–50)
KETONES UR QL STRIP: NEGATIVE
LEUKOCYTE ESTERASE UR QL STRIP.AUTO: ABNORMAL
LYMPHOCYTES # BLD AUTO: 1.34 10*3/MM3 (ref 0.7–3.1)
LYMPHOCYTES NFR BLD AUTO: 26.2 % (ref 19.6–45.3)
MCH RBC QN AUTO: 29.1 PG (ref 26.6–33)
MCHC RBC AUTO-ENTMCNC: 32.5 G/DL (ref 31.5–35.7)
MCV RBC AUTO: 89.5 FL (ref 79–97)
MONOCYTES # BLD AUTO: 0.5 10*3/MM3 (ref 0.1–0.9)
MONOCYTES NFR BLD AUTO: 9.8 % (ref 5–12)
NEUTROPHILS NFR BLD AUTO: 3.02 10*3/MM3 (ref 1.7–7)
NEUTROPHILS NFR BLD AUTO: 58.9 % (ref 42.7–76)
NITRITE UR QL STRIP: NEGATIVE
NRBC BLD AUTO-RTO: 0 /100 WBC (ref 0–0.2)
PH UR STRIP.AUTO: 6 [PH] (ref 5–8)
PHOSPHATE SERPL-MCNC: 2.8 MG/DL (ref 2.5–4.5)
PLATELET # BLD AUTO: 173 10*3/MM3 (ref 140–450)
PMV BLD AUTO: 10.7 FL (ref 6–12)
POTASSIUM SERPL-SCNC: 4.3 MMOL/L (ref 3.5–5.2)
PROT ?TM UR-MCNC: 18.5 MG/DL
PROT UR QL STRIP: ABNORMAL
PROT/CREAT UR: 0.14 MG/G{CREAT}
RBC # BLD AUTO: 4.4 10*6/MM3 (ref 3.77–5.28)
RBC # UR STRIP: ABNORMAL /HPF
REF LAB TEST METHOD: ABNORMAL
SODIUM SERPL-SCNC: 141 MMOL/L (ref 136–145)
SP GR UR STRIP: 1.02 (ref 1–1.03)
SQUAMOUS #/AREA URNS HPF: ABNORMAL /HPF
TIBC SERPL-MCNC: 456 MCG/DL (ref 298–536)
TRANSFERRIN SERPL-MCNC: 306 MG/DL (ref 200–360)
UROBILINOGEN UR QL STRIP: ABNORMAL
WBC # UR STRIP: ABNORMAL /HPF
WBC NRBC COR # BLD: 5.12 10*3/MM3 (ref 3.4–10.8)

## 2022-03-17 PROCEDURE — 82570 ASSAY OF URINE CREATININE: CPT

## 2022-03-17 PROCEDURE — 80069 RENAL FUNCTION PANEL: CPT

## 2022-03-17 PROCEDURE — 84156 ASSAY OF PROTEIN URINE: CPT

## 2022-03-17 PROCEDURE — 84466 ASSAY OF TRANSFERRIN: CPT

## 2022-03-17 PROCEDURE — 83540 ASSAY OF IRON: CPT

## 2022-03-17 PROCEDURE — 85025 COMPLETE CBC W/AUTO DIFF WBC: CPT

## 2022-03-17 PROCEDURE — 36415 COLL VENOUS BLD VENIPUNCTURE: CPT

## 2022-03-17 PROCEDURE — 82728 ASSAY OF FERRITIN: CPT

## 2022-03-17 PROCEDURE — 81001 URINALYSIS AUTO W/SCOPE: CPT

## 2022-04-29 ENCOUNTER — HOSPITAL ENCOUNTER (OUTPATIENT)
Dept: ULTRASOUND IMAGING | Facility: HOSPITAL | Age: 82
Discharge: HOME | End: 2022-04-29
Admitting: INTERNAL MEDICINE

## 2022-04-29 DIAGNOSIS — K74.60 CIRRHOSIS OF LIVER WITHOUT ASCITES, UNSPECIFIED HEPATIC CIRRHOSIS TYPE: ICD-10-CM

## 2022-04-29 PROCEDURE — 76705 ECHO EXAM OF ABDOMEN: CPT

## 2022-06-07 ENCOUNTER — HOSPITAL ENCOUNTER (EMERGENCY)
Facility: HOSPITAL | Age: 82
Discharge: HOME | End: 2022-06-08
Attending: EMERGENCY MEDICINE | Admitting: EMERGENCY MEDICINE

## 2022-06-07 DIAGNOSIS — M79.605 PAIN OF LEFT LOWER EXTREMITY: Primary | ICD-10-CM

## 2022-06-07 PROCEDURE — 96372 THER/PROPH/DIAG INJ SC/IM: CPT

## 2022-06-07 PROCEDURE — 99283 EMERGENCY DEPT VISIT LOW MDM: CPT

## 2022-06-08 ENCOUNTER — APPOINTMENT (OUTPATIENT)
Dept: GENERAL RADIOLOGY | Facility: HOSPITAL | Age: 82
End: 2022-06-08

## 2022-06-08 VITALS
OXYGEN SATURATION: 98 % | WEIGHT: 205 LBS | RESPIRATION RATE: 16 BRPM | HEART RATE: 60 BPM | HEIGHT: 59 IN | TEMPERATURE: 98 F | SYSTOLIC BLOOD PRESSURE: 139 MMHG | BODY MASS INDEX: 41.33 KG/M2 | DIASTOLIC BLOOD PRESSURE: 66 MMHG

## 2022-06-08 PROCEDURE — 73552 X-RAY EXAM OF FEMUR 2/>: CPT

## 2022-06-08 PROCEDURE — 25010000002 ORPHENADRINE CITRATE PER 60 MG: Performed by: EMERGENCY MEDICINE

## 2022-06-08 PROCEDURE — 96372 THER/PROPH/DIAG INJ SC/IM: CPT

## 2022-06-08 RX ORDER — OXYCODONE HYDROCHLORIDE AND ACETAMINOPHEN 5; 325 MG/1; MG/1
1 TABLET ORAL ONCE
Status: COMPLETED | OUTPATIENT
Start: 2022-06-08 | End: 2022-06-08

## 2022-06-08 RX ORDER — ORPHENADRINE CITRATE 100 MG/1
100 TABLET, EXTENDED RELEASE ORAL 2 TIMES DAILY
Qty: 20 TABLET | Refills: 0 | Status: SHIPPED | OUTPATIENT
Start: 2022-06-08 | End: 2022-07-11

## 2022-06-08 RX ORDER — OXYCODONE HYDROCHLORIDE AND ACETAMINOPHEN 5; 325 MG/1; MG/1
1 TABLET ORAL EVERY 6 HOURS PRN
Qty: 12 TABLET | Refills: 0 | Status: SHIPPED | OUTPATIENT
Start: 2022-06-08 | End: 2023-01-26

## 2022-06-08 RX ORDER — TRAMADOL HYDROCHLORIDE 50 MG/1
50 TABLET ORAL EVERY 6 HOURS PRN
Qty: 15 TABLET | Refills: 0 | Status: SHIPPED | OUTPATIENT
Start: 2022-06-08 | End: 2022-06-08

## 2022-06-08 RX ORDER — ORPHENADRINE CITRATE 100 MG/1
100 TABLET, EXTENDED RELEASE ORAL 2 TIMES DAILY
Qty: 20 TABLET | Refills: 0 | Status: SHIPPED | OUTPATIENT
Start: 2022-06-08 | End: 2022-06-08 | Stop reason: SDUPTHER

## 2022-06-08 RX ORDER — ORPHENADRINE CITRATE 30 MG/ML
60 INJECTION INTRAMUSCULAR; INTRAVENOUS ONCE
Status: COMPLETED | OUTPATIENT
Start: 2022-06-08 | End: 2022-06-08

## 2022-06-08 RX ORDER — OXYCODONE HYDROCHLORIDE AND ACETAMINOPHEN 5; 325 MG/1; MG/1
1 TABLET ORAL EVERY 6 HOURS PRN
Qty: 12 TABLET | Refills: 0 | Status: SHIPPED | OUTPATIENT
Start: 2022-06-08 | End: 2022-06-08 | Stop reason: SDUPTHER

## 2022-06-08 RX ADMIN — OXYCODONE HYDROCHLORIDE AND ACETAMINOPHEN 1 TABLET: 5; 325 TABLET ORAL at 00:32

## 2022-06-08 RX ADMIN — ORPHENADRINE CITRATE 60 MG: 60 INJECTION INTRAMUSCULAR; INTRAVENOUS at 00:32

## 2022-06-08 NOTE — ED PROVIDER NOTES
"Time: 11:54 PM EDT  Arrived by: private car  Chief Complaint: HIP PAIN  History provided by: pt  History is limited by: N/A     History of Present Illness:  Patient is a 81 y.o. female that presents to the emergency department with HIP PAIN that start 4 days ago. Pt described the pain as \"out of this word\" and stabbing which located of the left leg, hip and to the back of the knee. Pt has hx of arthritis. Pt denies urine or stool incontinence, dysuria, numbness or tingling.     Pt reports she has seen her PCP and taken steroids but it has no relief but also increased her blood sugar level as well. Pt also mentions that she had helped picked up her neighbor 3 weeks ago.       Hip Pain  Location:  Hip, left thigh  Severity:  Moderate  Onset quality:  Sudden  Duration:  3 days  Timing:  Constant  Progression:  Unchanged  Chronicity:  New  Associated symptoms: no abdominal pain, no chest pain, no congestion, no cough, no diarrhea, no fever, no headaches, no myalgias, no nausea, no rhinorrhea, no shortness of breath, no sore throat and no vomiting        Similar Symptoms Previously: No  Recently seen: pt was seen in this ED on 2020 for lower extremity pain.       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 ENDOSCOPY;  Service: Gastroenterology;  Laterality: N/A;  COLON POLYPS   • ENDOSCOPY N/A 2/8/2022    Procedure: ESOPHAGOGASTRODUODENOSCOPY WITH HOT SNARE POLYPECTOMY, BIOPSIES, ORISE INJECTION AND CLIP APPLICATION X1;  Surgeon: Pierre Delong MD;  Location: [facility] ENDOSCOPY;  Service: Gastroenterology;  Laterality: N/A;  GASTRIC POLYP AND HIATAL HERNIA   • EYE SURGERY Bilateral     cataracts   • HERNIA REPAIR      inguinal   • HYSTERECTOMY     • KIDNEY STONE SURGERY     • MASTECTOMY Bilateral    • PACEMAKER IMPLANTATION       Family History   Problem Relation Age of Onset   • Colon polyps Father    • Colon polyps Other    • Malig Hyperthermia Neg Hx        Home Medications:  Prior to Admission medications    Medication Sig Start Date End Date Taking? Authorizing Provider   apixaban (ELIQUIS) 5 MG tablet tablet Take 5 mg by mouth 2 (Two) Times a Day.    Nurse Gely Chu RN   Cetirizine HCl 10 MG capsule Take 1 capsule by mouth Daily.    Nurse Gely Chu RN   fluticasone (Flonase) 50 MCG/ACT nasal spray Daily. 10/18/21   Sherie Blandon MD   gabapentin (NEURONTIN) 300 MG capsule Take 300 mg by mouth Daily.    Nurse Gely Chu RN   hydrALAZINE (APRESOLINE) 25 MG tablet Take 25 mg by mouth 3 (Three) Times a Day.    Sherie Blandon MD   insulin aspart (novoLOG) 100 UNIT/ML injection Inject 20 Units under the skin into the appropriate area as directed 3 (Three) Times a Day.    Nurse Gely Chu RN   insulin glargine (LANTUS, SEMGLEE) 100 UNIT/ML injection Inject 50 Units under the skin into the appropriate area as directed Daily.    Nurse Gely Chu RN   metoprolol succinate XL (TOPROL-XL) 50 MG 24 hr tablet Take 50 mg by mouth Daily.    Sherie Blandon MD   olmesartan (BENICAR) 40 MG tablet Take 40 mg by mouth Daily.    Nurse Gely Chu RN   pantoprazole (PROTONIX) 20 MG EC tablet  "Take 40 mg by mouth Daily.    Emergency, Nurse Epic, RN   polyethylene glycol (GoLYTELY) 236 g solution Starting at noon on day prior to procedure, drink 8 ounces every 30 minutes until all gone or stools are clear. May add flavor packet. 2/9/22   Pierre Delong MD   spironolactone-hydrochlorothiazide (ALDACTAZIDE) 25-25 MG tablet Take 0.5 tablets by mouth Daily. 10/11/21   Provider, MD Sherie        Social History:   Social History     Tobacco Use   • Smoking status: Never Smoker   • Smokeless tobacco: Never Used   Vaping Use   • Vaping Use: Never used   Substance Use Topics   • Alcohol use: Never   • Drug use: Never     Recent travel: not applicable     Review of Systems:  Review of Systems   Constitutional: Negative for chills and fever.   HENT: Negative for congestion, rhinorrhea and sore throat.    Eyes: Negative for pain and visual disturbance.   Respiratory: Negative for apnea, cough, chest tightness and shortness of breath.    Cardiovascular: Negative for chest pain and palpitations.   Gastrointestinal: Negative for abdominal pain, diarrhea, nausea and vomiting.   Genitourinary: Negative for difficulty urinating and dysuria.   Musculoskeletal: Positive for back pain. Negative for joint swelling and myalgias.        Hip and left leg pain     Skin: Negative for color change.   Neurological: Negative for seizures and headaches.   Psychiatric/Behavioral: Negative.    All other systems reviewed and are negative.       Physical Exam:  /66 (BP Location: Right arm, Patient Position: Lying)   Pulse 60   Temp 98 °F (36.7 °C) (Oral)   Resp 16   Ht 149.9 cm (59\")   Wt 93 kg (205 lb)   SpO2 98%   BMI 41.40 kg/m²     Physical Exam  Vitals and nursing note reviewed.   Constitutional:       General: She is not in acute distress.     Appearance: Normal appearance. She is not toxic-appearing.   HENT:      Head: Normocephalic and atraumatic.      Jaw: There is normal jaw occlusion.   Eyes:      General: " Lids are normal.      Extraocular Movements: Extraocular movements intact.      Conjunctiva/sclera: Conjunctivae normal.      Pupils: Pupils are equal, round, and reactive to light.   Cardiovascular:      Rate and Rhythm: Normal rate and regular rhythm.      Pulses: Normal pulses.      Heart sounds: Normal heart sounds.   Pulmonary:      Effort: Pulmonary effort is normal. No respiratory distress.      Breath sounds: Normal breath sounds. No wheezing or rhonchi.   Abdominal:      General: Abdomen is flat.      Palpations: Abdomen is soft.      Tenderness: There is no abdominal tenderness. There is no guarding or rebound.   Musculoskeletal:         General: Normal range of motion.      Cervical back: Normal range of motion and neck supple.      Left upper leg: Tenderness present.      Right lower leg: No edema.      Left lower leg: No edema.        Legs:       Comments: Left thigh    Skin:     General: Skin is warm and dry.   Neurological:      Mental Status: She is alert and oriented to person, place, and time. Mental status is at baseline.   Psychiatric:         Mood and Affect: Mood normal.                Medications in the Emergency Department:  Medications   oxyCODONE-acetaminophen (PERCOCET) 5-325 MG per tablet 1 tablet (1 tablet Oral Given 6/8/22 0032)   orphenadrine (NORFLEX) injection 60 mg (60 mg Intramuscular Given 6/8/22 0032)        Labs  Lab Results (last 24 hours)     ** No results found for the last 24 hours. **           Imaging:  XR Femur 2 View Left    Result Date: 6/8/2022  PROCEDURE: XR FEMUR 2 VW LEFT  COMPARISON: None.  INDICATIONS: LEFT FEMUR PAIN; NO INJURY.  FINDINGS: Four views were obtained.  No acute fracture or acute malalignment is identified.  External artifacts obscure detail.  If symptoms or clinical concerns persist, consider imaging follow-up.       No acute fracture or acute malalignment is identified.     COMMENT:  Part of this note is an electronic transcription of spoken  language to printed text. The electronic translation/transcription may permit erroneous, or at times, nonsensical (or even sensical) words or phrases to be inadvertently transcribed or omitted; this  has reviewed the note for such errors (as well as additional errors); however, some may still exist.  ALEE RODRIGUEZ JR, MD       Electronically Signed and Approved By: LAEE RODRIGUEZ JR, MD on 6/08/2022 at 1:07                Procedures:  Procedures    Progress                            Medical Decision Making:  MDM  Number of Diagnoses or Management Options  Pain of left lower extremity  Diagnosis management comments: X-ray of the femur is negative for fracture.  The patient will stay at the daughter's place [facility] and was advised to follow-up with her primary care doctor next 2 to 3 days.       Amount and/or Complexity of Data Reviewed  Tests in the radiology section of CPT®: reviewed  Independent visualization of images, tracings, or specimens: yes    Risk of Complications, Morbidity, and/or Mortality  Presenting problems: moderate  Management options: moderate    Patient Progress  Patient progress: stable       Final diagnoses:   Pain of left lower extremity        Disposition:  ED Disposition     ED Disposition   Discharge    Condition   Stable    Comment   --             Documentation assistance provided by JESSIE FLYNN acting as scribe for Lance Greer MD. Information recorded by the scribe was done at my direction and has been verified and validated by me.          Jessie Flynn  06/08/22 0036       Jessie Flynn  06/08/22 0200       Lance Greer MD  06/08/22 [id number]

## 2022-06-08 NOTE — ED NOTES
Patient went to her PCP placed on tramadol and prednisone, blood glucose levels have increased per patient with steroid and neither medication helped with her pain

## 2022-06-09 ENCOUNTER — TELEPHONE (OUTPATIENT)
Dept: GASTROENTEROLOGY | Facility: CLINIC | Age: 82
End: 2022-06-09

## 2022-06-09 NOTE — TELEPHONE ENCOUNTER
Pts daughter called and asked that we reschedule her moms colonoscopy. She had recently been in the hospital for hip pain and is currently bedridden. I have rescheduled. pts daughter in aware of new date and time

## 2022-06-13 ENCOUNTER — TRANSCRIBE ORDERS (OUTPATIENT)
Dept: ADMINISTRATIVE | Facility: HOSPITAL | Age: 82
End: 2022-06-13

## 2022-06-13 DIAGNOSIS — M54.16 LUMBAR RADICULOPATHY: Primary | ICD-10-CM

## 2022-06-22 ENCOUNTER — HOSPITAL ENCOUNTER (OUTPATIENT)
Dept: MRI IMAGING | Facility: HOSPITAL | Age: 82
Discharge: HOME | End: 2022-06-22
Admitting: INTERNAL MEDICINE

## 2022-06-22 VITALS
SYSTOLIC BLOOD PRESSURE: 119 MMHG | OXYGEN SATURATION: 94 % | DIASTOLIC BLOOD PRESSURE: 72 MMHG | HEIGHT: 59 IN | WEIGHT: 204 LBS | TEMPERATURE: 97.8 F | RESPIRATION RATE: 16 BRPM | HEART RATE: 75 BPM | BODY MASS INDEX: 41.12 KG/M2

## 2022-06-22 DIAGNOSIS — M54.16 LUMBAR RADICULOPATHY: ICD-10-CM

## 2022-06-22 PROCEDURE — 72148 MRI LUMBAR SPINE W/O DYE: CPT

## 2022-07-19 RX ORDER — PANTOPRAZOLE SODIUM 40 MG/1
40 TABLET, DELAYED RELEASE ORAL DAILY
COMMUNITY

## 2022-07-19 RX ORDER — GABAPENTIN 600 MG/1
600 TABLET ORAL 3 TIMES DAILY
COMMUNITY
End: 2022-10-31 | Stop reason: SDUPTHER

## 2022-07-21 ENCOUNTER — HOSPITAL ENCOUNTER (OUTPATIENT)
Facility: HOSPITAL | Age: 82
Setting detail: HOSPITAL OUTPATIENT SURGERY
Discharge: HOME | End: 2022-07-21
Attending: INTERNAL MEDICINE | Admitting: INTERNAL MEDICINE

## 2022-07-21 ENCOUNTER — ANESTHESIA EVENT (OUTPATIENT)
Dept: GASTROENTEROLOGY | Facility: HOSPITAL | Age: 82
End: 2022-07-21

## 2022-07-21 ENCOUNTER — ANESTHESIA (OUTPATIENT)
Dept: GASTROENTEROLOGY | Facility: HOSPITAL | Age: 82
End: 2022-07-21

## 2022-07-21 VITALS
BODY MASS INDEX: 41.59 KG/M2 | TEMPERATURE: 97 F | OXYGEN SATURATION: 95 % | DIASTOLIC BLOOD PRESSURE: 71 MMHG | SYSTOLIC BLOOD PRESSURE: 162 MMHG | RESPIRATION RATE: 20 BRPM | WEIGHT: 205.91 LBS | HEART RATE: 67 BPM

## 2022-07-21 DIAGNOSIS — Z86.010 ENCOUNTER FOR COLONOSCOPY FOLLOWING COLON POLYP REMOVAL: ICD-10-CM

## 2022-07-21 DIAGNOSIS — Z09 ENCOUNTER FOR COLONOSCOPY FOLLOWING COLON POLYP REMOVAL: ICD-10-CM

## 2022-07-21 LAB — GLUCOSE BLDC GLUCOMTR-MCNC: 136 MG/DL (ref 70–99)

## 2022-07-21 PROCEDURE — 25010000002 PROPOFOL 10 MG/ML EMULSION: Performed by: NURSE ANESTHETIST, CERTIFIED REGISTERED

## 2022-07-21 PROCEDURE — 45380 COLONOSCOPY AND BIOPSY: CPT | Performed by: INTERNAL MEDICINE

## 2022-07-21 PROCEDURE — 45385 COLONOSCOPY W/LESION REMOVAL: CPT | Performed by: INTERNAL MEDICINE

## 2022-07-21 PROCEDURE — 88305 TISSUE EXAM BY PATHOLOGIST: CPT | Performed by: INTERNAL MEDICINE

## 2022-07-21 PROCEDURE — 82962 GLUCOSE BLOOD TEST: CPT

## 2022-07-21 RX ORDER — SODIUM CHLORIDE, SODIUM LACTATE, POTASSIUM CHLORIDE, CALCIUM CHLORIDE 600; 310; 30; 20 MG/100ML; MG/100ML; MG/100ML; MG/100ML
30 INJECTION, SOLUTION INTRAVENOUS CONTINUOUS
Status: DISCONTINUED | OUTPATIENT
Start: 2022-07-21 | End: 2022-07-21 | Stop reason: HOSPADM

## 2022-07-21 RX ORDER — PROPOFOL 10 MG/ML
VIAL (ML) INTRAVENOUS AS NEEDED
Status: DISCONTINUED | OUTPATIENT
Start: 2022-07-21 | End: 2022-07-21 | Stop reason: SURG

## 2022-07-21 RX ORDER — LIDOCAINE HYDROCHLORIDE 20 MG/ML
INJECTION, SOLUTION EPIDURAL; INFILTRATION; INTRACAUDAL; PERINEURAL AS NEEDED
Status: DISCONTINUED | OUTPATIENT
Start: 2022-07-21 | End: 2022-07-21 | Stop reason: SURG

## 2022-07-21 RX ADMIN — PROPOFOL 200 MCG/KG/MIN: 10 INJECTION, EMULSION INTRAVENOUS at 14:43

## 2022-07-21 RX ADMIN — LIDOCAINE HYDROCHLORIDE 100 MG: 20 INJECTION, SOLUTION EPIDURAL; INFILTRATION; INTRACAUDAL; PERINEURAL at 14:43

## 2022-07-21 RX ADMIN — PROPOFOL 50 MG: 10 INJECTION, EMULSION INTRAVENOUS at 14:43

## 2022-07-21 RX ADMIN — SODIUM CHLORIDE, POTASSIUM CHLORIDE, SODIUM LACTATE AND CALCIUM CHLORIDE 30 ML/HR: 600; 310; 30; 20 INJECTION, SOLUTION INTRAVENOUS at 13:58

## 2022-07-21 NOTE — H&P
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)    • Diabetes mellitus (HCC)    • GERD (gastroesophageal reflux disease)    • Hypertension    • Kidney stone    • Neuropathy    • Sleep apnea        Past Surgical History:  Past Surgical History:   Procedure Laterality Date   •  SECTION     • COLONOSCOPY N/A 2022    Procedure: COLONOSCOPY WITH HOT SNARE PIECE MEAL POLYPECTOMY, ORISE INJECTION;  Surgeon: Pierre Delong MD;  Location: [facility] ENDOSCOPY;  Service: Gastroenterology;  Laterality: N/A;  COLON POLYPS   • ENDOSCOPY N/A 2022    Procedure: ESOPHAGOGASTRODUODENOSCOPY WITH HOT SNARE POLYPECTOMY, BIOPSIES, ORISE INJECTION AND CLIP APPLICATION X1;  Surgeon: Pierre Delong MD;  Location: [facility] ENDOSCOPY;  Service: Gastroenterology;  Laterality: N/A;  GASTRIC POLYP AND HIATAL HERNIA   • EYE SURGERY Bilateral     cataracts   • HERNIA REPAIR      inguinal   • HYSTERECTOMY     • KIDNEY STONE SURGERY     • MASTECTOMY Bilateral    • PACEMAKER IMPLANTATION         Family History:  Family History   Problem Relation Age of Onset   • Colon polyps Father    • Colon polyps Other    • Malig Hyperthermia Neg Hx        Social History:   reports that she has never smoked. She has never used smokeless tobacco. She reports that she does not drink alcohol and does not use drugs.    Medications:   Medications Prior to Admission   Medication Sig Dispense Refill Last Dose   • apixaban (ELIQUIS) 5 MG tablet tablet Take 5 mg by mouth 2 (Two) Times a Day.   2022   • Cetirizine HCl 10 MG capsule Take 1 capsule by mouth Daily.   2022 at Unknown time   • fluticasone (FLONASE) 50 MCG/ACT nasal  spray Daily.   7/21/2022 at Unknown time   • gabapentin (NEURONTIN) 600 MG tablet Take 600 mg by mouth 3 (Three) Times a Day.   7/21/2022 at Unknown time   • hydrALAZINE (APRESOLINE) 25 MG tablet Take 25 mg by mouth 3 (Three) Times a Day.   7/21/2022 at Unknown time   • insulin aspart (novoLOG) 100 UNIT/ML injection Inject 20 Units under the skin into the appropriate area as directed 3 (Three) Times a Day.   Past Week at Unknown time   • insulin glargine (LANTUS, SEMGLEE) 100 UNIT/ML injection Inject 50 Units under the skin into the appropriate area as directed Daily.   Past Week at Unknown time   • metoprolol succinate XL (TOPROL-XL) 50 MG 24 hr tablet Take 50 mg by mouth Daily.   7/21/2022 at Unknown time   • olmesartan (BENICAR) 40 MG tablet Take 40 mg by mouth Daily.   Past Week at Unknown time   • oxyCODONE-acetaminophen (PERCOCET) 5-325 MG per tablet Take 1 tablet by mouth Every 6 (Six) Hours As Needed for Severe Pain . 12 tablet 0 7/21/2022 at Unknown time   • pantoprazole (PROTONIX) 40 MG EC tablet Take 40 mg by mouth Daily.   7/21/2022 at Unknown time   • spironolactone-hydrochlorothiazide (ALDACTAZIDE) 25-25 MG tablet Take 0.5 tablets by mouth Daily.   Past Week at Unknown time       Allergies:  Aricept [donepezil], Lisinopril, Nsaids, and Pseudoephedrine        Objective     Blood pressure 144/62, pulse 61, temperature 98.2 °F (36.8 °C), temperature source Temporal, resp. rate 16, weight 93.4 kg (205 lb 14.6 oz), SpO2 96 %.    Physical Exam   Constitutional: Pt is oriented to person, place, and time and well-developed, well-nourished, and in no distress.   Mouth/Throat: Oropharynx is clear and moist.   Neck: Normal range of motion.   Cardiovascular: Normal rate, regular rhythm and normal heart sounds.    Pulmonary/Chest: Effort normal and breath sounds normal.   Abdominal: Soft. Nontender  Skin: Skin is warm and dry.   Psychiatric: Mood, memory, affect and judgment normal.     Assessment & Plan      Diagnosis:  Large polyp removed piecemeal    Anticipated Surgical Procedure:  Colonoscopy    The risks, benefits, and alternatives of this procedure have been discussed with the patient or the responsible party- the patient understands and agrees to proceed.

## 2022-07-21 NOTE — ANESTHESIA PREPROCEDURE EVALUATION
Anesthesia Evaluation     Patient summary reviewed and Nursing notes reviewed   no history of anesthetic complications:  NPO Solid Status: > 8 hours  NPO Liquid Status: > 2 hours           Airway   Mallampati: III  TM distance: >3 FB  Neck ROM: full  No difficulty expected  Dental          Pulmonary - normal exam    breath sounds clear to auscultation  (+) sleep apnea,   Cardiovascular - normal exam  Exercise tolerance: good (4-7 METS)    Rhythm: regular  Rate: normal    (+) pacemaker pacemaker interrogated >year ago, interrogated 6-12 months ago, hypertension well controlled, dysrhythmias,       Neuro/Psych- negative ROS  GI/Hepatic/Renal/Endo    (+) obesity,  GERD well controlled,  liver disease, renal disease, diabetes mellitus type 2,     Musculoskeletal     (+) back pain,   Abdominal    Substance History - negative use     OB/GYN negative ob/gyn ROS         Other - negative ROS                       Anesthesia Plan    ASA 4     general     (Total IV Anesthesia    Patient understands anesthesia not responsible for dental damage.  )  intravenous induction     Anesthetic plan, risks, benefits, and alternatives have been provided, discussed and informed consent has been obtained with: patient.    Plan discussed with CRNA.        CODE STATUS:

## 2022-07-21 NOTE — ANESTHESIA POSTPROCEDURE EVALUATION
Patient: Bella Ritter    Procedure Summary     Date: 07/21/22 Room / Location: [facility] ENDOSCOPY 1 / [facility] ENDOSCOPY    Anesthesia Start: 1441 Anesthesia Stop: 1519    Procedure: COLONOSCOPY WITH POLYPECTOMY, CAUTERY (N/A ) Diagnosis:       Encounter for colonoscopy following colon polyp removal      (Encounter for colonoscopy following colon polyp removal [Z09, Z86.010])    Surgeons: Pierre Delong MD Provider: Jasson Azevedo MD    Anesthesia Type: general ASA Status: 4          Anesthesia Type: general    Vitals  Vitals Value Taken Time   /85 07/21/22 1529   Temp 36.1 °C (97 °F) 07/21/22 1534   Pulse 67 07/21/22 1534   Resp 20 07/21/22 1534   SpO2 95 % 07/21/22 1534           Post Anesthesia Care and Evaluation    Patient location during evaluation: bedside  Patient participation: complete - patient participated  Level of consciousness: awake  Pain score: 0  Pain management: adequate    Airway patency: patent  Anesthetic complications: No anesthetic complications  PONV Status: none  Cardiovascular status: acceptable and stable  Respiratory status: acceptable and room air  Hydration status: acceptable    Comments: An Anesthesiologist personally participated in the most demanding procedures (including induction and emergence if applicable) in the anesthesia plan, monitored the course of anesthesia administration at frequent intervals and remained physically present and available for immediate diagnosis and treatment of emergencies.

## 2022-07-26 ENCOUNTER — TELEPHONE (OUTPATIENT)
Dept: GASTROENTEROLOGY | Facility: CLINIC | Age: 82
End: 2022-07-26

## 2022-07-26 ENCOUNTER — PREP FOR SURGERY (OUTPATIENT)
Dept: OTHER | Facility: HOSPITAL | Age: 82
End: 2022-07-26

## 2022-07-26 DIAGNOSIS — Z09 ENCOUNTER FOR COLONOSCOPY FOLLOWING COLON POLYP REMOVAL: Primary | ICD-10-CM

## 2022-07-26 DIAGNOSIS — Z86.010 ENCOUNTER FOR COLONOSCOPY FOLLOWING COLON POLYP REMOVAL: Primary | ICD-10-CM

## 2022-07-26 NOTE — TELEPHONE ENCOUNTER
----- Message from JUSTIN Lombardi sent at 7/25/2022  5:04 PM EDT -----  Precancerous polyp removed, Dr. Delong wants to do repeat colonoscopy in 6 months due to persistent polyp tissue, (history of piecemeal polypectomy in February).

## 2022-07-28 NOTE — TELEPHONE ENCOUNTER
Pt is scheduled for colonoscopy on 01/26/2023 arrival time 0830. Educated and mailed on clear liquids, bowel prep, and colonoscopy instructions. Pt verified understanding.    Pt takes Eliquis; Blood Thinner Clearance letter request sent to Cardiologist: Dr. Reyes.

## 2022-08-04 ENCOUNTER — TELEPHONE (OUTPATIENT)
Dept: GASTROENTEROLOGY | Facility: CLINIC | Age: 82
End: 2022-08-04

## 2022-08-04 NOTE — TELEPHONE ENCOUNTER
We rec'd blood thinner clearance for this patient. I s/w her to let her know and asked that she tisha her calendar as a reminder. I have also mailed patient a copy of the blood thinner clearance form.

## 2022-09-23 ENCOUNTER — TRANSCRIBE ORDERS (OUTPATIENT)
Dept: LAB | Facility: HOSPITAL | Age: 82
End: 2022-09-23

## 2022-09-23 ENCOUNTER — LAB (OUTPATIENT)
Dept: LAB | Facility: HOSPITAL | Age: 82
End: 2022-09-23

## 2022-09-23 DIAGNOSIS — N18.32 STAGE 3B CHRONIC KIDNEY DISEASE: ICD-10-CM

## 2022-09-23 DIAGNOSIS — N18.32 STAGE 3B CHRONIC KIDNEY DISEASE: Primary | ICD-10-CM

## 2022-09-23 LAB
25(OH)D3 SERPL-MCNC: 34.8 NG/ML (ref 30–100)
ALBUMIN SERPL-MCNC: 4.2 G/DL (ref 3.5–5.2)
ANION GAP SERPL CALCULATED.3IONS-SCNC: 8.1 MMOL/L (ref 5–15)
BASOPHILS # BLD AUTO: 0.03 10*3/MM3 (ref 0–0.2)
BASOPHILS NFR BLD AUTO: 0.5 % (ref 0–1.5)
BILIRUB UR QL STRIP: NEGATIVE
BUN SERPL-MCNC: 24 MG/DL (ref 8–23)
BUN/CREAT SERPL: 19.4 (ref 7–25)
CALCIUM SPEC-SCNC: 9.8 MG/DL (ref 8.6–10.5)
CHLORIDE SERPL-SCNC: 105 MMOL/L (ref 98–107)
CLARITY UR: CLEAR
CO2 SERPL-SCNC: 29.9 MMOL/L (ref 22–29)
COLOR UR: YELLOW
CREAT SERPL-MCNC: 1.24 MG/DL (ref 0.57–1)
CREAT UR-MCNC: 75 MG/DL
DEPRECATED RDW RBC AUTO: 42.9 FL (ref 37–54)
EGFRCR SERPLBLD CKD-EPI 2021: 43 ML/MIN/1.73
EOSINOPHIL # BLD AUTO: 0.22 10*3/MM3 (ref 0–0.4)
EOSINOPHIL NFR BLD AUTO: 3.9 % (ref 0.3–6.2)
ERYTHROCYTE [DISTWIDTH] IN BLOOD BY AUTOMATED COUNT: 12.5 % (ref 12.3–15.4)
GLUCOSE SERPL-MCNC: 92 MG/DL (ref 65–99)
GLUCOSE UR STRIP-MCNC: NEGATIVE MG/DL
HCT VFR BLD AUTO: 42.1 % (ref 34–46.6)
HGB BLD-MCNC: 13.8 G/DL (ref 12–15.9)
HGB UR QL STRIP.AUTO: NEGATIVE
IMM GRANULOCYTES # BLD AUTO: 0.01 10*3/MM3 (ref 0–0.05)
IMM GRANULOCYTES NFR BLD AUTO: 0.2 % (ref 0–0.5)
KETONES UR QL STRIP: NEGATIVE
LEUKOCYTE ESTERASE UR QL STRIP.AUTO: NEGATIVE
LYMPHOCYTES # BLD AUTO: 1.32 10*3/MM3 (ref 0.7–3.1)
LYMPHOCYTES NFR BLD AUTO: 23.4 % (ref 19.6–45.3)
MCH RBC QN AUTO: 30.6 PG (ref 26.6–33)
MCHC RBC AUTO-ENTMCNC: 32.8 G/DL (ref 31.5–35.7)
MCV RBC AUTO: 93.3 FL (ref 79–97)
MONOCYTES # BLD AUTO: 0.56 10*3/MM3 (ref 0.1–0.9)
MONOCYTES NFR BLD AUTO: 9.9 % (ref 5–12)
NEUTROPHILS NFR BLD AUTO: 3.51 10*3/MM3 (ref 1.7–7)
NEUTROPHILS NFR BLD AUTO: 62.1 % (ref 42.7–76)
NITRITE UR QL STRIP: NEGATIVE
NRBC BLD AUTO-RTO: 0 /100 WBC (ref 0–0.2)
PH UR STRIP.AUTO: 6 [PH] (ref 5–8)
PHOSPHATE SERPL-MCNC: 2.7 MG/DL (ref 2.5–4.5)
PLATELET # BLD AUTO: 177 10*3/MM3 (ref 140–450)
PMV BLD AUTO: 10.8 FL (ref 6–12)
POTASSIUM SERPL-SCNC: 3.9 MMOL/L (ref 3.5–5.2)
PROT ?TM UR-MCNC: 10.8 MG/DL
PROT UR QL STRIP: NEGATIVE
PROT/CREAT UR: 0.14 MG/G{CREAT}
PTH-INTACT SERPL-MCNC: 32 PG/ML (ref 15–65)
RBC # BLD AUTO: 4.51 10*6/MM3 (ref 3.77–5.28)
SODIUM SERPL-SCNC: 143 MMOL/L (ref 136–145)
SP GR UR STRIP: 1.02 (ref 1–1.03)
UROBILINOGEN UR QL STRIP: NORMAL
WBC NRBC COR # BLD: 5.65 10*3/MM3 (ref 3.4–10.8)

## 2022-09-23 PROCEDURE — 85025 COMPLETE CBC W/AUTO DIFF WBC: CPT

## 2022-09-23 PROCEDURE — 80069 RENAL FUNCTION PANEL: CPT

## 2022-09-23 PROCEDURE — 83970 ASSAY OF PARATHORMONE: CPT

## 2022-09-23 PROCEDURE — 81003 URINALYSIS AUTO W/O SCOPE: CPT

## 2022-09-23 PROCEDURE — 82306 VITAMIN D 25 HYDROXY: CPT

## 2022-09-23 PROCEDURE — 82570 ASSAY OF URINE CREATININE: CPT

## 2022-09-23 PROCEDURE — 84156 ASSAY OF PROTEIN URINE: CPT

## 2022-09-23 PROCEDURE — 36415 COLL VENOUS BLD VENIPUNCTURE: CPT

## 2022-10-20 ENCOUNTER — TRANSCRIBE ORDERS (OUTPATIENT)
Dept: ADMINISTRATIVE | Facility: HOSPITAL | Age: 82
End: 2022-10-20

## 2022-10-20 DIAGNOSIS — E04.9 GOITER: Primary | ICD-10-CM

## 2022-10-31 ENCOUNTER — OFFICE VISIT (OUTPATIENT)
Dept: SLEEP MEDICINE | Facility: HOSPITAL | Age: 82
End: 2022-10-31

## 2022-10-31 VITALS
SYSTOLIC BLOOD PRESSURE: 156 MMHG | HEART RATE: 63 BPM | WEIGHT: 216.2 LBS | DIASTOLIC BLOOD PRESSURE: 66 MMHG | BODY MASS INDEX: 43.59 KG/M2 | OXYGEN SATURATION: 96 % | HEIGHT: 59 IN

## 2022-10-31 DIAGNOSIS — E66.01 CLASS 3 SEVERE OBESITY DUE TO EXCESS CALORIES WITH SERIOUS COMORBIDITY AND BODY MASS INDEX (BMI) OF 40.0 TO 44.9 IN ADULT: ICD-10-CM

## 2022-10-31 DIAGNOSIS — G47.33 OSA ON CPAP: Primary | ICD-10-CM

## 2022-10-31 DIAGNOSIS — Z99.89 OSA ON CPAP: Primary | ICD-10-CM

## 2022-10-31 PROCEDURE — G0463 HOSPITAL OUTPT CLINIC VISIT: HCPCS

## 2022-10-31 PROCEDURE — 99213 OFFICE O/P EST LOW 20 MIN: CPT | Performed by: INTERNAL MEDICINE

## 2022-10-31 RX ORDER — INSULIN GLARGINE 100 [IU]/ML
INJECTION, SOLUTION SUBCUTANEOUS
COMMUNITY
End: 2022-10-31 | Stop reason: SDUPTHER

## 2022-10-31 RX ORDER — GABAPENTIN 600 MG/1
600 TABLET ORAL DAILY
COMMUNITY

## 2022-10-31 RX ORDER — OXYCODONE HYDROCHLORIDE AND ACETAMINOPHEN 325; 5 MG/5ML; MG/5ML
SOLUTION ORAL
COMMUNITY
Start: 2022-07-14 | End: 2023-01-26

## 2022-10-31 RX ORDER — ALBUTEROL SULFATE 90 UG/1
AEROSOL, METERED RESPIRATORY (INHALATION)
COMMUNITY
End: 2023-01-26

## 2022-10-31 RX ORDER — BLOOD-GLUCOSE METER
KIT MISCELLANEOUS
COMMUNITY
Start: 2022-08-12

## 2022-10-31 RX ORDER — INSULIN ASPART 100 [IU]/ML
20 INJECTION, SOLUTION INTRAVENOUS; SUBCUTANEOUS
COMMUNITY
End: 2022-10-31 | Stop reason: SDUPTHER

## 2022-10-31 RX ORDER — MELATONIN
COMMUNITY
End: 2023-01-26

## 2022-10-31 RX ORDER — HYDRALAZINE HYDROCHLORIDE 50 MG/1
TABLET, FILM COATED ORAL
COMMUNITY
Start: 2022-07-26 | End: 2022-10-31 | Stop reason: ALTCHOICE

## 2022-10-31 RX ORDER — CETIRIZINE HYDROCHLORIDE 10 MG/1
10 TABLET ORAL DAILY
COMMUNITY

## 2022-10-31 RX ORDER — PANTOPRAZOLE SODIUM 40 MG/1
40 TABLET, DELAYED RELEASE ORAL DAILY
COMMUNITY
End: 2023-01-26

## 2022-10-31 NOTE — PROGRESS NOTES
"  [address] [address]  Phone: [phone]  Fax: [phone]      SLEEP CLINIC FOLLOW UP PROGRESS NOTE.    Bella Ritter  [id number]   1940  82 y.o.  female      PCP: Cindy Sal MD      Date of visit: 10/31/2022    Chief Complaint   Patient presents with   • Sleep Apnea   • Obesity       HPI:  This is a 82 y.o. years old patient is here for the management of obstructive sleep apnea.  Sleep apnea is severe in severity with a AHI of 38/hr. Patient is using positive airway pressure therapy with CPAP 13 cm and the symptoms of snoring, non-restorative sleep and daytime excessive sleepiness have improved significantly on the therapy. Normally goes to bed at 12 midnight and wakes up at 11 AM.  The patient wakes up 2 time(s) during the night and has no problem going back to sleep.  Feels refreshed after waking up.  Patient also denies headaches and nasal congestion.     Medications and allergies are reviewed by me and documented in the encounter.     SOCIAL (habits pertaining to sleep medicine)  History tobacco use:No   History of alcohol use: 0 per week  Caffeine use: 0     REVIEW OF SYSTEMS:   [place] Sleepiness Scale :Total score: 3   Nasal congestion:No   Dry mouth/nose:No   Post nasal drip; No   Acid reflux/Heartburn:No   Abd bloating:No   Morning headache:No   Anxiety:No   Depression:No    PHYSICAL EXAMINATION:  CONSTITUTIONAL:  Vitals:    10/31/22 1500   BP: 156/66   Pulse: 63   SpO2: 96%   Weight: 98.1 kg (216 lb 3.2 oz)   Height: 149.9 cm (59.02\")    Body mass index is 43.64 kg/m².   NOSE: nasal passages are clear, No deformities noted   RESP SYSTEM: Not in any respiratory distress, no chest deformities noted,   CARDIOVASULAR: No edema noted  NEURO: Oriented x 3, gait normal,  Mood and affect appeared appropriate      Data reviewed:  The Smart card downloaded on 10/31/2022 has been reviewed independently by me for compliance and discussed " the data with the patient.   Compliance; 100%  More than 4 hr use, 100%  Average use of the device 9 hours and 15 minutes per night  Residual AHI: 4.8 /hr (goal < 5.0 /hr)  Mask type: Nasal mask  Device: REMstar but received DreamStation 2  DME: [employer]      ASSESSMENT AND PLAN:  · Obstructive sleep apnea ( G 47.33).  The symptoms of sleep apnea have improved with the device and the treatment.  Patient's compliance with the device is excellent for treatment of sleep apnea.  I have independently reviewed the smart card down load and discussed with the patient the download data and encouarged the patient to continue to use the device.The residual AHI is acceptable. The device is benefiting the patient and the device is medically necessary.  Without proper control of sleep apnea and good compliance there is a increased risk for hypertension, diabetes mellitus and nonrestorative sleep with hypersomnia which can increase risk for motor vehicle accidents.  Untreated sleep apnea is also a risk factor for development of atrial fibrillation, pulmonary hypertension and stroke. The patient is also instructed to get the supplies from the [employer] company and and change them on a regular basis.  A prescription for supplies has been sent to the [employer].  I have also discussed the good sleep hygiene habits and adequate amount of sleep needed for good health.  · Obesity  3 with BMI is Body mass index is 43.64 kg/m².. I have discuss the relationship between the weight and sleep apnea. The benefit of weight loss in reducing severity of sleep apnea was discussed. Discussed diet and exercise with the patient to achieve ideal BMI.   · Return in about 1 year (around 10/31/2023) for with smart card down load. . Patient's questions were answered.      Gil Randall MD  Sleep Medicine.  Medical Director, [facility] sleep [facility]  10/31/2022 ,

## 2022-11-07 ENCOUNTER — LAB (OUTPATIENT)
Dept: LAB | Facility: HOSPITAL | Age: 82
End: 2022-11-07

## 2022-11-07 ENCOUNTER — HOSPITAL ENCOUNTER (OUTPATIENT)
Dept: ULTRASOUND IMAGING | Facility: HOSPITAL | Age: 82
Discharge: HOME | End: 2022-11-07

## 2022-11-07 ENCOUNTER — TRANSCRIBE ORDERS (OUTPATIENT)
Dept: ADMINISTRATIVE | Facility: HOSPITAL | Age: 82
End: 2022-11-07

## 2022-11-07 DIAGNOSIS — E04.9 GOITER: ICD-10-CM

## 2022-11-07 DIAGNOSIS — E04.2 MULTINODULAR GOITER: Primary | ICD-10-CM

## 2022-11-07 DIAGNOSIS — E04.2 MULTINODULAR GOITER: ICD-10-CM

## 2022-11-07 PROCEDURE — 76536 US EXAM OF HEAD AND NECK: CPT

## 2022-11-07 PROCEDURE — 84439 ASSAY OF FREE THYROXINE: CPT

## 2022-11-07 PROCEDURE — 36415 COLL VENOUS BLD VENIPUNCTURE: CPT

## 2022-11-07 PROCEDURE — 84443 ASSAY THYROID STIM HORMONE: CPT

## 2022-11-08 LAB
T4 FREE SERPL-MCNC: 1.03 NG/DL (ref 0.93–1.7)
TSH SERPL DL<=0.05 MIU/L-ACNC: 0.55 UIU/ML (ref 0.27–4.2)

## 2022-11-14 ENCOUNTER — TRANSCRIBE ORDERS (OUTPATIENT)
Dept: ADMINISTRATIVE | Facility: HOSPITAL | Age: 82
End: 2022-11-14

## 2022-11-14 DIAGNOSIS — K74.60 CIRRHOSIS OF LIVER WITHOUT ASCITES, UNSPECIFIED HEPATIC CIRRHOSIS TYPE: ICD-10-CM

## 2022-11-14 DIAGNOSIS — Z85.3 HISTORY OF BREAST CANCER: ICD-10-CM

## 2022-11-14 DIAGNOSIS — Z12.31 ENCOUNTER FOR SCREENING MAMMOGRAM FOR MALIGNANT NEOPLASM OF BREAST: ICD-10-CM

## 2022-11-14 DIAGNOSIS — D64.9 ANEMIA, UNSPECIFIED TYPE: Primary | ICD-10-CM

## 2022-12-05 ENCOUNTER — HOSPITAL ENCOUNTER (OUTPATIENT)
Dept: NUCLEAR MEDICINE | Facility: HOSPITAL | Age: 82
Discharge: HOME | End: 2022-12-05

## 2022-12-05 ENCOUNTER — HOSPITAL ENCOUNTER (OUTPATIENT)
Dept: ULTRASOUND IMAGING | Facility: HOSPITAL | Age: 82
Discharge: HOME | End: 2022-12-05

## 2022-12-05 DIAGNOSIS — K74.60 CIRRHOSIS OF LIVER WITHOUT ASCITES, UNSPECIFIED HEPATIC CIRRHOSIS TYPE: ICD-10-CM

## 2022-12-05 DIAGNOSIS — D64.9 ANEMIA, UNSPECIFIED TYPE: ICD-10-CM

## 2022-12-05 PROCEDURE — 78306 BONE IMAGING WHOLE BODY: CPT

## 2022-12-05 PROCEDURE — 76705 ECHO EXAM OF ABDOMEN: CPT

## 2022-12-05 PROCEDURE — 0 TECHNETIUM MEDRONATE KIT: Performed by: INTERNAL MEDICINE

## 2022-12-05 PROCEDURE — A9503 TC99M MEDRONATE: HCPCS | Performed by: INTERNAL MEDICINE

## 2022-12-05 RX ORDER — TC 99M MEDRONATE 20 MG/10ML
22.4 INJECTION, POWDER, LYOPHILIZED, FOR SOLUTION INTRAVENOUS
Status: COMPLETED | OUTPATIENT
Start: 2022-12-05 | End: 2022-12-05

## 2022-12-05 RX ADMIN — TC 99M MEDRONATE 22.4 MILLICURIE: 20 INJECTION, POWDER, LYOPHILIZED, FOR SOLUTION INTRAVENOUS at 08:00

## 2023-01-03 ENCOUNTER — TELEPHONE (OUTPATIENT)
Dept: GASTROENTEROLOGY | Facility: CLINIC | Age: 83
End: 2023-01-03
Payer: MEDICARE

## 2023-01-03 NOTE — TELEPHONE ENCOUNTER
Dr. Delong advised that he will need to be out of the office on 1.26.23.      Patient has an upcoming ENDO procedure. I called patient to reschedule. No answer. Left message for patient to call back.      Procedure: Colonoscopy          *WILL NEED TO SEND NEW BLOOD THINNER CLEARANCE*    Letter sent to patient

## 2023-01-25 ENCOUNTER — TELEPHONE (OUTPATIENT)
Dept: GASTROENTEROLOGY | Facility: CLINIC | Age: 83
End: 2023-01-25
Payer: MEDICARE

## 2023-01-26 RX ORDER — INSULIN GLARGINE 100 [IU]/ML
68 INJECTION, SOLUTION SUBCUTANEOUS NIGHTLY
COMMUNITY

## 2023-01-26 NOTE — TELEPHONE ENCOUNTER
CALLED DR MCNEILL' OFFICE FOR UPDATE. SPOKE WITH JUAN RAMON AND SHE WILL SEND PRIORITY MESSAGE TO HIS MA AND SEND US CLEARANCE ASAP

## 2023-01-27 NOTE — TELEPHONE ENCOUNTER
PT CALLED AND April ADVISED PT OF HOLDING ELIQUIS 2 DAYS PRIOR TO PROCEDURE - CLEARANCE REINDEXED IN CHART

## 2023-01-31 ENCOUNTER — ANESTHESIA (OUTPATIENT)
Dept: GASTROENTEROLOGY | Facility: HOSPITAL | Age: 83
End: 2023-01-31
Payer: MEDICARE

## 2023-01-31 ENCOUNTER — ANESTHESIA EVENT (OUTPATIENT)
Dept: GASTROENTEROLOGY | Facility: HOSPITAL | Age: 83
End: 2023-01-31
Payer: MEDICARE

## 2023-01-31 ENCOUNTER — HOSPITAL ENCOUNTER (OUTPATIENT)
Facility: HOSPITAL | Age: 83
Setting detail: HOSPITAL OUTPATIENT SURGERY
Discharge: HOME | End: 2023-01-31
Attending: INTERNAL MEDICINE | Admitting: INTERNAL MEDICINE
Payer: MEDICARE

## 2023-01-31 VITALS
TEMPERATURE: 98.2 F | OXYGEN SATURATION: 97 % | WEIGHT: 211.64 LBS | HEART RATE: 62 BPM | HEIGHT: 60 IN | RESPIRATION RATE: 18 BRPM | DIASTOLIC BLOOD PRESSURE: 66 MMHG | BODY MASS INDEX: 41.55 KG/M2 | SYSTOLIC BLOOD PRESSURE: 108 MMHG

## 2023-01-31 DIAGNOSIS — Z86.010 ENCOUNTER FOR COLONOSCOPY FOLLOWING COLON POLYP REMOVAL: ICD-10-CM

## 2023-01-31 DIAGNOSIS — Z09 ENCOUNTER FOR COLONOSCOPY FOLLOWING COLON POLYP REMOVAL: ICD-10-CM

## 2023-01-31 LAB — GLUCOSE BLDC GLUCOMTR-MCNC: 192 MG/DL (ref 70–99)

## 2023-01-31 PROCEDURE — 88305 TISSUE EXAM BY PATHOLOGIST: CPT | Performed by: INTERNAL MEDICINE

## 2023-01-31 PROCEDURE — 45385 COLONOSCOPY W/LESION REMOVAL: CPT | Performed by: INTERNAL MEDICINE

## 2023-01-31 PROCEDURE — 82962 GLUCOSE BLOOD TEST: CPT

## 2023-01-31 PROCEDURE — 25010000002 PROPOFOL 10 MG/ML EMULSION: Performed by: NURSE ANESTHETIST, CERTIFIED REGISTERED

## 2023-01-31 RX ORDER — LIDOCAINE HYDROCHLORIDE 20 MG/ML
INJECTION, SOLUTION EPIDURAL; INFILTRATION; INTRACAUDAL; PERINEURAL AS NEEDED
Status: DISCONTINUED | OUTPATIENT
Start: 2023-01-31 | End: 2023-01-31 | Stop reason: SURG

## 2023-01-31 RX ORDER — PROPOFOL 10 MG/ML
VIAL (ML) INTRAVENOUS AS NEEDED
Status: DISCONTINUED | OUTPATIENT
Start: 2023-01-31 | End: 2023-01-31

## 2023-01-31 RX ORDER — PROPOFOL 10 MG/ML
VIAL (ML) INTRAVENOUS AS NEEDED
Status: DISCONTINUED | OUTPATIENT
Start: 2023-01-31 | End: 2023-01-31 | Stop reason: SURG

## 2023-01-31 RX ORDER — SODIUM CHLORIDE, SODIUM LACTATE, POTASSIUM CHLORIDE, CALCIUM CHLORIDE 600; 310; 30; 20 MG/100ML; MG/100ML; MG/100ML; MG/100ML
30 INJECTION, SOLUTION INTRAVENOUS CONTINUOUS
Status: DISCONTINUED | OUTPATIENT
Start: 2023-01-31 | End: 2023-01-31 | Stop reason: HOSPADM

## 2023-01-31 RX ADMIN — SODIUM CHLORIDE, POTASSIUM CHLORIDE, SODIUM LACTATE AND CALCIUM CHLORIDE 30 ML/HR: 600; 310; 30; 20 INJECTION, SOLUTION INTRAVENOUS at 13:15

## 2023-01-31 RX ADMIN — PROPOFOL 50 MG: 10 INJECTION, EMULSION INTRAVENOUS at 13:23

## 2023-01-31 RX ADMIN — PROPOFOL 50 MG: 10 INJECTION, EMULSION INTRAVENOUS at 13:20

## 2023-01-31 RX ADMIN — PROPOFOL 200 MCG/KG/MIN: 10 INJECTION, EMULSION INTRAVENOUS at 13:20

## 2023-01-31 RX ADMIN — LIDOCAINE HYDROCHLORIDE 100 MG: 20 INJECTION, SOLUTION EPIDURAL; INFILTRATION; INTRACAUDAL; PERINEURAL at 13:20

## 2023-01-31 NOTE — ANESTHESIA POSTPROCEDURE EVALUATION
Patient: Bella Ritter    Procedure Summary     Date: 01/31/23 Room / Location: [facility] ENDOSCOPY 4 / [facility] ENDOSCOPY    Anesthesia Start: 1317 Anesthesia Stop: 1352    Procedure: COLONOSCOPY WITH HOT SNARE POLYPECTOMY Diagnosis:       Encounter for colonoscopy following colon polyp removal      (Encounter for colonoscopy following colon polyp removal [Z09, Z86.010])    Surgeons: Pierre Delong MD Provider: Cordell López MD    Anesthesia Type: general ASA Status: 4          Anesthesia Type: general    Vitals  Vitals Value Taken Time   /66 01/31/23 1351   Temp 36.4 °C (97.5 °F) 01/31/23 1350   Pulse 62 01/31/23 1354   Resp 17 01/31/23 1350   SpO2 96 % 01/31/23 1354   Vitals shown include unvalidated device data.        Post Anesthesia Care and Evaluation    Patient location during evaluation: bedside  Patient participation: complete - patient participated  Level of consciousness: awake  Pain management: adequate    Airway patency: patent  Anesthetic complications: No anesthetic complications  PONV Status: none  Cardiovascular status: acceptable and stable  Respiratory status: acceptable  Hydration status: acceptable    Comments: An Anesthesiologist personally participated in the most demanding procedures (including induction and emergence if applicable) in the anesthesia plan, monitored the course of anesthesia administration at frequent intervals and remained physically present and available for immediate diagnosis and treatment of emergencies.

## 2023-04-03 ENCOUNTER — HOSPITAL ENCOUNTER (OUTPATIENT)
Dept: MAMMOGRAPHY | Facility: HOSPITAL | Age: 83
Discharge: HOME | End: 2023-04-03
Admitting: INTERNAL MEDICINE
Payer: MEDICARE

## 2023-04-03 DIAGNOSIS — Z12.31 ENCOUNTER FOR SCREENING MAMMOGRAM FOR MALIGNANT NEOPLASM OF BREAST: ICD-10-CM

## 2023-04-03 DIAGNOSIS — Z85.3 HISTORY OF BREAST CANCER: ICD-10-CM

## 2023-04-03 PROCEDURE — 77067 SCR MAMMO BI INCL CAD: CPT

## 2023-04-03 PROCEDURE — 77063 BREAST TOMOSYNTHESIS BI: CPT

## 2023-04-17 ENCOUNTER — TRANSCRIBE ORDERS (OUTPATIENT)
Dept: ADMINISTRATIVE | Facility: HOSPITAL | Age: 83
End: 2023-04-17
Payer: MEDICARE

## 2023-04-17 DIAGNOSIS — I71.20 INTRATHORACIC AORTIC ANEURYSM: Primary | ICD-10-CM

## 2023-05-10 ENCOUNTER — HOSPITAL ENCOUNTER (OUTPATIENT)
Dept: CT IMAGING | Facility: HOSPITAL | Age: 83
Discharge: HOME | End: 2023-05-10
Admitting: INTERNAL MEDICINE
Payer: MEDICARE

## 2023-05-10 ENCOUNTER — TRANSCRIBE ORDERS (OUTPATIENT)
Dept: ADMINISTRATIVE | Facility: HOSPITAL | Age: 83
End: 2023-05-10
Payer: MEDICARE

## 2023-05-10 DIAGNOSIS — E11.9 DIABETES MELLITUS WITHOUT COMPLICATION: ICD-10-CM

## 2023-05-10 DIAGNOSIS — C50.911 MALIGNANT NEOPLASM OF RIGHT FEMALE BREAST, UNSPECIFIED ESTROGEN RECEPTOR STATUS, UNSPECIFIED SITE OF BREAST: ICD-10-CM

## 2023-05-10 DIAGNOSIS — I71.20 INTRATHORACIC AORTIC ANEURYSM: ICD-10-CM

## 2023-05-10 DIAGNOSIS — I10 ESSENTIAL HYPERTENSION, MALIGNANT: Primary | ICD-10-CM

## 2023-05-10 DIAGNOSIS — R94.5 NONSPECIFIC ABNORMAL RESULTS OF LIVER FUNCTION STUDY: ICD-10-CM

## 2023-05-10 LAB
CREAT BLDA-MCNC: 1.4 MG/DL
EGFRCR SERPLBLD CKD-EPI 2021: 37.2 ML/MIN/1.73

## 2023-05-10 PROCEDURE — 82565 ASSAY OF CREATININE: CPT

## 2023-05-10 PROCEDURE — 25510000001 IOPAMIDOL PER 1 ML: Performed by: INTERNAL MEDICINE

## 2023-05-10 PROCEDURE — 71260 CT THORAX DX C+: CPT

## 2023-05-10 RX ADMIN — IOPAMIDOL 94 ML: 755 INJECTION, SOLUTION INTRAVENOUS at 15:18

## 2023-05-15 ENCOUNTER — HOSPITAL ENCOUNTER (OUTPATIENT)
Dept: ULTRASOUND IMAGING | Facility: HOSPITAL | Age: 83
Discharge: HOME | End: 2023-05-15
Admitting: INTERNAL MEDICINE
Payer: MEDICARE

## 2023-05-15 DIAGNOSIS — C50.911 MALIGNANT NEOPLASM OF RIGHT FEMALE BREAST, UNSPECIFIED ESTROGEN RECEPTOR STATUS, UNSPECIFIED SITE OF BREAST: ICD-10-CM

## 2023-05-15 DIAGNOSIS — I10 ESSENTIAL HYPERTENSION, MALIGNANT: ICD-10-CM

## 2023-05-15 DIAGNOSIS — E11.9 DIABETES MELLITUS WITHOUT COMPLICATION: ICD-10-CM

## 2023-05-15 DIAGNOSIS — R94.5 NONSPECIFIC ABNORMAL RESULTS OF LIVER FUNCTION STUDY: ICD-10-CM

## 2023-05-15 PROCEDURE — 76705 ECHO EXAM OF ABDOMEN: CPT

## 2023-06-07 ENCOUNTER — LAB (OUTPATIENT)
Dept: LAB | Facility: HOSPITAL | Age: 83
End: 2023-06-07
Payer: MEDICARE

## 2023-06-07 ENCOUNTER — OFFICE VISIT (OUTPATIENT)
Dept: PULMONOLOGY | Facility: CLINIC | Age: 83
End: 2023-06-07
Payer: MEDICARE

## 2023-06-07 VITALS
HEART RATE: 87 BPM | OXYGEN SATURATION: 97 % | SYSTOLIC BLOOD PRESSURE: 152 MMHG | BODY MASS INDEX: 43.1 KG/M2 | HEIGHT: 59 IN | DIASTOLIC BLOOD PRESSURE: 107 MMHG | RESPIRATION RATE: 18 BRPM | WEIGHT: 213.8 LBS

## 2023-06-07 DIAGNOSIS — E66.01 MORBID OBESITY WITH BMI OF 40.0-44.9, ADULT: ICD-10-CM

## 2023-06-07 DIAGNOSIS — R05.3 CHRONIC COUGH: ICD-10-CM

## 2023-06-07 DIAGNOSIS — Z23 ENCOUNTER FOR IMMUNIZATION: ICD-10-CM

## 2023-06-07 DIAGNOSIS — R06.09 DOE (DYSPNEA ON EXERTION): Primary | ICD-10-CM

## 2023-06-07 DIAGNOSIS — R06.2 WHEEZE: ICD-10-CM

## 2023-06-07 DIAGNOSIS — Z78.9 NEVER SMOKED CIGARETTES: ICD-10-CM

## 2023-06-07 DIAGNOSIS — J45.51 SEVERE PERSISTENT ASTHMA WITH ACUTE EXACERBATION: ICD-10-CM

## 2023-06-07 DIAGNOSIS — R06.09 DOE (DYSPNEA ON EXERTION): ICD-10-CM

## 2023-06-07 LAB
BASOPHILS # BLD AUTO: 0.02 10*3/MM3 (ref 0–0.2)
BASOPHILS NFR BLD AUTO: 0.2 % (ref 0–1.5)
DEPRECATED RDW RBC AUTO: 40.2 FL (ref 37–54)
EOSINOPHIL # BLD AUTO: 0.02 10*3/MM3 (ref 0–0.4)
EOSINOPHIL NFR BLD AUTO: 0.2 % (ref 0.3–6.2)
ERYTHROCYTE [DISTWIDTH] IN BLOOD BY AUTOMATED COUNT: 12.1 % (ref 12.3–15.4)
EXHALED NITROUS OXIDE: 33
HCT VFR BLD AUTO: 42.7 % (ref 34–46.6)
HGB BLD-MCNC: 14.1 G/DL (ref 12–15.9)
IMM GRANULOCYTES # BLD AUTO: 0.03 10*3/MM3 (ref 0–0.05)
IMM GRANULOCYTES NFR BLD AUTO: 0.3 % (ref 0–0.5)
LYMPHOCYTES # BLD AUTO: 1.12 10*3/MM3 (ref 0.7–3.1)
LYMPHOCYTES NFR BLD AUTO: 10.4 % (ref 19.6–45.3)
MCH RBC QN AUTO: 30.3 PG (ref 26.6–33)
MCHC RBC AUTO-ENTMCNC: 33 G/DL (ref 31.5–35.7)
MCV RBC AUTO: 91.6 FL (ref 79–97)
MONOCYTES # BLD AUTO: 0.75 10*3/MM3 (ref 0.1–0.9)
MONOCYTES NFR BLD AUTO: 6.9 % (ref 5–12)
NEUTROPHILS NFR BLD AUTO: 8.86 10*3/MM3 (ref 1.7–7)
NEUTROPHILS NFR BLD AUTO: 82 % (ref 42.7–76)
NRBC BLD AUTO-RTO: 0 /100 WBC (ref 0–0.2)
PLATELET # BLD AUTO: 228 10*3/MM3 (ref 140–450)
PMV BLD AUTO: 10.5 FL (ref 6–12)
RBC # BLD AUTO: 4.66 10*6/MM3 (ref 3.77–5.28)
WBC NRBC COR # BLD: 10.8 10*3/MM3 (ref 3.4–10.8)

## 2023-06-07 PROCEDURE — 82785 ASSAY OF IGE: CPT

## 2023-06-07 PROCEDURE — 85025 COMPLETE CBC W/AUTO DIFF WBC: CPT

## 2023-06-07 PROCEDURE — 36415 COLL VENOUS BLD VENIPUNCTURE: CPT

## 2023-06-07 RX ORDER — BUDESONIDE, GLYCOPYRROLATE, AND FORMOTEROL FUMARATE 160; 9; 4.8 UG/1; UG/1; UG/1
2 AEROSOL, METERED RESPIRATORY (INHALATION) 2 TIMES DAILY
Qty: 1 EACH | Refills: 11 | Status: SHIPPED | OUTPATIENT
Start: 2023-06-07

## 2023-06-07 RX ORDER — ALBUTEROL SULFATE 90 UG/1
AEROSOL, METERED RESPIRATORY (INHALATION)
COMMUNITY
Start: 2023-04-12

## 2023-06-07 RX ORDER — BUDESONIDE, GLYCOPYRROLATE, AND FORMOTEROL FUMARATE 160; 9; 4.8 UG/1; UG/1; UG/1
2 AEROSOL, METERED RESPIRATORY (INHALATION) 2 TIMES DAILY
Qty: 4 EACH | Refills: 0 | COMMUNITY
Start: 2023-06-07 | End: 2023-06-08

## 2023-06-07 RX ORDER — PREDNISONE 20 MG/1
40 TABLET ORAL DAILY
Qty: 10 TABLET | Refills: 0 | Status: SHIPPED | OUTPATIENT
Start: 2023-06-07

## 2023-06-07 RX ORDER — DULAGLUTIDE 0.75 MG/.5ML
INJECTION, SOLUTION SUBCUTANEOUS
COMMUNITY
Start: 2023-05-19

## 2023-06-07 RX ORDER — MONTELUKAST SODIUM 10 MG/1
TABLET ORAL
COMMUNITY
Start: 2023-05-18

## 2023-06-07 NOTE — PROGRESS NOTES
Primary Care Provider  Cindy Sal MD   Referring Provider  No ref. provider found      Patient Complaint  Establish Care, Shortness of Breath (CONSTANT ), Asthma, Cough (SOMETIMES PRODUCTIVE ), and Wheezing (PATIENT STATES IT IS SO BAD IT WILL WAKE HER UP /)      Subjective          Bella Ritter presents to [facility]MONARY [facility] MEDICINE      History of Presenting Illness  Bella Ritter is a 82 y.o. female here for evaluation of dyspnea.  She is a never smoker.  She has had 1 to 2 years progressively worsening shortness of breath, coughing with thin clear secretions and wheezing with little activity.  She has been on albuterol at home which does help somewhat albeit transiently.  She was seen in urgent care and her primary care who told her that she might have asthma.  She has never been told she has any other disease like this.  She denies any seasonal allergies or rhinitis symptoms.  She has never been on a controller inhaler or had work-up for asthma.  She does report maybe having a breathing test in the past, however we do not have access to those records.    Denies headaches, chest pain, weight loss or hemoptysis. Denies fevers, chills and night sweats.  She is able to perform ADLs without difficulties and denies any swollen glands/lymph nodes in the head or neck.    I have personally reviewed the review of systems, past family, social, medical and surgical histories; and agree with their findings.    Family History   Problem Relation Age of Onset    Colon polyps Father     Colon polyps Other     Malig Hyperthermia Neg Hx         Social History     Socioeconomic History    Marital status:    Tobacco Use    Smoking status: Never     Passive exposure: Past    Smokeless tobacco: Never   Vaping Use    Vaping Use: Never used   Substance and Sexual Activity    Alcohol use: Never    Drug use: Never    Sexual activity: Defer        Past Medical History:   Diagnosis  Date    Atrial fibrillation     Back pain     Breast cancer     Cancer     breast    Cirrhosis of liver not due to alcohol     Diabetes mellitus     GERD (gastroesophageal reflux disease)     Hypertension     Kidney stone     Neuropathy     Sleep apnea     CPAP        Immunization History   Administered Date(s) Administered    COVID-19 (PFIZER) Purple Cap Monovalent 02/13/2021, 03/06/2021, 09/30/2021    Covid-19 (Pfizer) Gray Cap Monovalent 05/16/2022    Flu Vaccine Split Quad 10/09/2015    FluLaval/Fluzone >6mos 10/09/2015    Fluzone High Dose =>65 Years ([employer] ONLY) 08/23/2012    Fluzone Quad >6mos (Multi-dose) 09/12/2011, 08/22/2013, 09/15/2016, 09/14/2017, 09/18/2018    Influenza Injectable Mdck Pf Quad 10/13/2020, 10/18/2021    Influenza, Unspecified 10/13/2020, 10/18/2021    Pneumococcal Conjugate 13-Valent (PCV13) 03/15/2016    Pneumococcal Conjugate 20-Valent (PCV20) 06/07/2023         Allergies   Allergen Reactions    Aricept [Donepezil] Unknown - High Severity    Diphenhydramine Unknown - Low Severity    Lisinopril Swelling    Nsaids Unknown - High Severity and Unknown - Low Severity    Pseudoephedrine Swelling          Current Outpatient Medications:     albuterol sulfate  (90 Base) MCG/ACT inhaler, INHALE TWO PUFFS BY MOUTH FOUR TIMES DAILY AS NEEDED, Disp: , Rfl:     apixaban (ELIQUIS) 5 MG tablet tablet, Take 1 tablet by mouth 2 (Two) Times a Day., Disp: , Rfl:     B-D UF III MINI PEN NEEDLES 31G X 5 MM misc, , Disp: , Rfl:     cetirizine (zyrTEC) 10 MG tablet, Take 1 tablet by mouth Daily., Disp: , Rfl:     cholecalciferol (VITAMIN D3) 25 MCG (1000 UT) tablet, Daily., Disp: , Rfl:     FREESTYLE LITE test strip, , Disp: , Rfl:     gabapentin (NEURONTIN) 600 MG tablet, Take 1 tablet by mouth Daily., Disp: , Rfl:     insulin aspart (novoLOG) 100 UNIT/ML injection, Inject 15 Units under the skin into the appropriate area as directed 3 (Three) Times a Day Before Meals. 15-18 units as needed, Disp:  ", Rfl:     insulin glargine (LANTUS, SEMGLEE) 100 UNIT/ML injection, Inject 68 Units under the skin into the appropriate area as directed Every Night., Disp: , Rfl:     metoprolol succinate XL (TOPROL-XL) 50 MG 24 hr tablet, Take 1 tablet by mouth Daily., Disp: , Rfl:     montelukast (SINGULAIR) 10 MG tablet, , Disp: , Rfl:     olmesartan (BENICAR) 40 MG tablet, Take 1 tablet by mouth Daily., Disp: , Rfl:     pantoprazole (PROTONIX) 40 MG EC tablet, Take 1 tablet by mouth Daily., Disp: , Rfl:     spironolactone-hydrochlorothiazide (ALDACTAZIDE) 25-25 MG tablet, Take 0.75 tablets by mouth Daily., Disp: , Rfl:     Trulicity 0.75 MG/0.5ML solution pen-injector, , Disp: , Rfl:     Budeson-Glycopyrrol-Formoterol (Breztri Aerosphere) 160-9-4.8 MCG/ACT aerosol inhaler, Inhale 2 puffs 2 (Two) Times a Day., Disp: 1 each, Rfl: 11    Budeson-Glycopyrrol-Formoterol (Breztri Aerosphere) 160-9-4.8 MCG/ACT aerosol inhaler, Inhale 2 puffs 2 (Two) Times a Day for 1 day., Disp: 4 each, Rfl: 0    hydrALAZINE (APRESOLINE) 25 MG tablet, Take 25 mg by mouth 3 (Three) Times a Day. (Patient not taking: Reported on 6/7/2023), Disp: , Rfl:     predniSONE (DELTASONE) 20 MG tablet, Take 2 tablets by mouth Daily., Disp: 10 tablet, Rfl: 0         Objective     Vital Signs:   BP (!) 152/107 (BP Location: Left arm, Patient Position: Sitting, Cuff Size: Large Adult)   Pulse 87   Resp 18   Ht 149.9 cm (59\")   Wt 97 kg (213 lb 12.8 oz)   SpO2 97%   BMI 43.18 kg/m²     Physical Exam  Vital Signs Reviewed   WDWN, Alert, NAD.    HEENT:  PERRL, EOMI.  OP, nares clear, no sinus tenderness  Neck:  Supple, no JVD, no thyromegaly  Lymph: no axillary, cervical, supraclavicular lymphadenopathy noted bilaterally  Chest:  good aeration, coarse wheezing bilaterally, tympanic to percussion bilaterally, no work of breathing noted  CV: RRR, no MGR, pulses 2+, equal.  Abd:  Soft, NT, ND, + BS, no HSM, obese  EXT:  no clubbing, no cyanosis, no edema, no joint " tenderness  Neuro:  A&Ox3, CN grossly intact, no focal deficits.  Skin: No rashes or lesions noted       Result Review :   I have personally reviewed the office notes from referring provider.  I personally viewed the patient's chest CT showing clear lung fields with no obvious parenchymal lung abnormalities.  CBC in 2022 showed 230 peripheral eosinophils.  No evidence of chronic hypercapnia.  Office notes from primary care reviewed.             Assessment and Plan      Patient Active Problem List   Diagnosis    Positive colorectal cancer screening using Cologuard test    Fatty liver    Morbid obesity with BMI of 40.0-44.9, adult    Encounter for colonoscopy following colon polyp removal    UNIQUE on CPAP    Severe persistent asthma with acute exacerbation    Never smoked cigarettes       Impression:  Chronic dyspnea  Chronic cough  Chronic wheezing  Peripheral eosinophilia  Question severe persistent asthma with acute exacerbation  Never smoker  Obstructive sleep apnea on CPAP  Morbid obesity with BMI 43.1    Plan:  The patient's clinical presentation and it sounds like she has asthma and is actively having an exacerbation and will need better asthma control as well as formal work-up  Performed exhaled nitric oxide testing in the office today.  Level was 33 indicative of a moderate degree of eosinophilic airway inflammation  We will do 5 days of prednisone 40 mg daily  Start Symbicort 160/4.5, 2 puffs twice a day.  Continue albuterol as needed.  Inhaler education provided today.  Check CBC and IgE  We will reach out to her primary care and see if they have her pulmonary function test on record.  If not, we will need to order our own at the next follow-up visit.  No indication to repeat chest CT at this time  Diet and exercise counseling provided today.  Recommended 30 minutes daily exercise well is an 1800 -calorie/day diet.  Patient verbalized understanding.  Total time counseling the patient today was 4  minutes  Smoking status: Never smoker  Vaccination status: Up-to-date with flu, Prevnar 13 and pneumonia vaccines.  Give Prevnar 20 today.  Medications personally reviewed.      Follow Up   Return in about 4 weeks (around 7/5/2023).  Patient was given instructions and counseling regarding her condition or for health maintenance advice. Please see specific information pulled into the AVS if appropriate.     Electronically signed by Jasson Bowman MD, 06/07/23, 1:34 PM EDT.

## 2023-06-08 LAB — IGE SERPL-ACNC: 80.1 KU/L

## 2023-07-06 PROBLEM — E11.21 DIABETIC NEPHROPATHY: Status: ACTIVE | Noted: 2022-03-24

## 2023-07-06 PROBLEM — E78.5 HYPERLIPIDEMIA: Status: ACTIVE | Noted: 2022-03-24

## 2023-07-06 PROBLEM — I47.19 PAT (PAROXYSMAL ATRIAL TACHYCARDIA): Status: ACTIVE | Noted: 2017-10-19

## 2023-07-06 PROBLEM — I26.99 PULMONARY THROMBOEMBOLISM: Status: ACTIVE | Noted: 2022-03-24

## 2023-07-06 PROBLEM — I47.1 PAT (PAROXYSMAL ATRIAL TACHYCARDIA): Status: ACTIVE | Noted: 2017-10-19

## 2023-07-06 PROBLEM — I10 HYPERTENSION: Status: ACTIVE | Noted: 2022-09-29

## 2023-07-06 PROBLEM — M54.16 LUMBAR RADICULOPATHY: Status: ACTIVE | Noted: 2023-07-06

## 2023-07-06 PROBLEM — N18.31 STAGE 3A CHRONIC KIDNEY DISEASE: Status: ACTIVE | Noted: 2022-09-29

## 2023-07-06 PROBLEM — R05.3 CHRONIC COUGH: Status: ACTIVE | Noted: 2023-07-06

## 2023-07-06 PROBLEM — D72.19 PERIPHERAL EOSINOPHILIA: Status: ACTIVE | Noted: 2023-07-06

## 2023-07-06 PROBLEM — I77.810 MILD ASCENDING AORTA DILATATION: Status: ACTIVE | Noted: 2019-04-18

## 2023-07-06 PROBLEM — Z95.0 CARDIAC PACEMAKER: Status: ACTIVE | Noted: 2019-04-18

## 2023-07-06 PROBLEM — M51.369 DEGENERATION OF LUMBAR INTERVERTEBRAL DISC: Status: ACTIVE | Noted: 2022-09-18

## 2023-07-06 PROBLEM — M51.36 DEGENERATION OF LUMBAR INTERVERTEBRAL DISC: Status: ACTIVE | Noted: 2022-09-18

## 2023-07-06 PROBLEM — I38 HEART VALVE DISEASE: Status: ACTIVE | Noted: 2021-01-12

## 2023-07-06 PROBLEM — E11.22 TYPE 2 DIABETES MELLITUS WITH DIABETIC CHRONIC KIDNEY DISEASE: Chronic | Status: ACTIVE | Noted: 2022-09-29

## 2023-07-06 PROBLEM — I49.5 SSS (SICK SINUS SYNDROME): Status: ACTIVE | Noted: 2019-05-20

## 2023-07-06 PROBLEM — R06.09 CHRONIC DYSPNEA: Status: ACTIVE | Noted: 2023-07-06

## 2023-07-06 PROBLEM — E55.9 VITAMIN D DEFICIENCY: Status: ACTIVE | Noted: 2023-07-06

## 2023-07-31 ENCOUNTER — TELEPHONE (OUTPATIENT)
Dept: PULMONOLOGY | Facility: CLINIC | Age: 83
End: 2023-07-31
Payer: MEDICARE

## 2023-08-01 ENCOUNTER — TELEPHONE (OUTPATIENT)
Dept: SLEEP MEDICINE | Facility: HOSPITAL | Age: 83
End: 2023-08-01
Payer: MEDICARE

## 2023-08-03 NOTE — PROGRESS NOTES
Primary Care Provider  Cindy Sal MD     Referring Provider  No ref. provider found     Chief Complaint  Shortness of Breath, Follow-up (6-8 week follow up. ), and Cough    Subjective          History of Presenting Illness  Patient is an 82-year-old female, patient Dr. Bowman's who presents management dyspnea who presents for follow-up visit today.  Patient states that since last visit her breathing is at baseline.  Patient states that she does get short of breath that is worse with exertion, mild in severity, and improved with rest.  Patient states that she is taking Breztri, however would prefer an inhaler that is once daily instead of twice daily.  Patient states at times where she does make her heart race.  Patient states that she is taking butyryl inhaler as needed. Last office visit patient had alpha-1 antitrypsin level and genotype drawn.  Alpha-1 came back with a normal genotype of M/M with level of 117.  Since last office visit patient a pulmonary function test and a 6-minute walk test completed on 7/19/2023. Pulmonary function test report states no obstruction. No restriction. No response of bronchodilator therapy seen. Normal DLCO.  Patient's 6-minute walk test report shows saturations are 93% on room air.  Patient states that she is using her CPAP machine when she sleeps and receives her supplies through [facility] that is managed by the sleep center. Patient states that she is taking Zyrtec for seasonal allergies.  Patient would like a referral to an allergist for allergy testing.  Patient denies fever, chills, night sweats, swollen glands in the head and neck, unintentional weight loss, hemoptysis, purulent sputum production, dysphagia, chest pain, palpitations, chest tightness, abdominal pain, nausea, vomiting, and diarrhea.  Patient also denies any myalgias, changes in sense of taste and/or smell, sore throat, any other coronavirus or flu-like symptoms.  Patient denies any leg swelling,  orthopnea, paroxysmal nocturnal dyspnea.  Patient is able to perform activities of daily living.        Review of Systems   Constitutional:  Negative for activity change, appetite change, chills, diaphoresis, fatigue, fever, unexpected weight gain and unexpected weight loss.        Negative for Insomnia   HENT:  Negative for congestion (Nasal), mouth sores, nosebleeds, postnasal drip, sore throat, swollen glands and trouble swallowing.         Negative for Thrush  Negative for Hoarseness  Negative for Allergies/Hay Fever  Negative for Recent Head injury  Negative for Ear Fullness  Negative for Nasal or Sinus pain  Negative for Dry lips  Negative for Nasal discharge   Respiratory:  Positive for cough (intermittent), shortness of breath and wheezing (intermittent). Negative for apnea and chest tightness.         Negative for Hemoptysis  Negative for Pleuritic pain   Cardiovascular:  Negative for chest pain, palpitations and leg swelling.        Negative for Claudication  Negative for Cyanosis  Negative for Dyspnea on exertion   Gastrointestinal:  Negative for abdominal pain, diarrhea, nausea, vomiting and GERD.   Musculoskeletal:  Negative for joint swelling and myalgias.        Negative for Joint pain  Negative for Joint stiffness   Skin:  Negative for color change, dry skin, pallor and rash.   Neurological:  Negative for syncope, weakness and headache.   Hematological:  Negative for adenopathy. Does not bruise/bleed easily.      Family History   Problem Relation Age of Onset    Colon polyps Father     Colon polyps Other     Malig Hyperthermia Neg Hx         Social History     Socioeconomic History    Marital status:    Tobacco Use    Smoking status: Never     Passive exposure: Past    Smokeless tobacco: Never   Vaping Use    Vaping Use: Never used   Substance and Sexual Activity    Alcohol use: Never    Drug use: Never    Sexual activity: Defer        Past Medical History:   Diagnosis Date    Atrial  fibrillation     Back pain     Breast cancer     Cancer     breast    Cirrhosis of liver not due to alcohol     Diabetes mellitus     GERD (gastroesophageal reflux disease)     Hypertension     Kidney stone     Neuropathy     Sleep apnea     CPAP        Immunization History   Administered Date(s) Administered    COVID-19 (PFIZER) Purple Cap Monovalent 02/13/2021, 03/06/2021, 09/30/2021    Covid-19 (Pfizer) Gray Cap Monovalent 05/16/2022    Flu Vaccine Split Quad 10/09/2015    Fluzone >6mos 10/09/2015    Fluzone High Dose =>65 Years ([facility] ONLY) 08/23/2012    Fluzone Quad >6mos (Multi-dose) 09/12/2011, 08/22/2013, 09/15/2016, 09/14/2017, 09/18/2018    Influenza Injectable Mdck Pf Quad 10/13/2020, 10/18/2021    Influenza, Unspecified 10/13/2020, 10/18/2021    Pneumococcal Conjugate 13-Valent (PCV13) 03/15/2016    Pneumococcal Conjugate 20-Valent (PCV20) 06/07/2023       Allergies   Allergen Reactions    Aricept [Donepezil] Unknown - High Severity    Diphenhydramine Unknown - Low Severity    Lisinopril Swelling    Nsaids Unknown - High Severity and Unknown - Low Severity    Pseudoephedrine Swelling          Current Outpatient Medications:     albuterol sulfate  (90 Base) MCG/ACT inhaler, INHALE TWO PUFFS BY MOUTH FOUR TIMES DAILY AS NEEDED, Disp: , Rfl:     cetirizine (zyrTEC) 10 MG tablet, Take 1 tablet by mouth Daily for 90 days., Disp: 90 tablet, Rfl: 3    apixaban (ELIQUIS) 5 MG tablet tablet, Take 1 tablet by mouth 2 (Two) Times a Day., Disp: , Rfl:     B-D UF III MINI PEN NEEDLES 31G X 5 MM misc, , Disp: , Rfl:     cholecalciferol (VITAMIN D3) 25 MCG (1000 UT) tablet, Daily., Disp: , Rfl:     erythromycin (ROMYCIN) 5 MG/GM ophthalmic ointment, erythromycin 5 mg/gram (0.5 %) eye ointment (Patient not taking: Reported on 7/6/2023), Disp: , Rfl:     Fluticasone-Umeclidin-Vilant (TRELEGY) 100-62.5-25 MCG/ACT inhaler, Inhale 1 puff Daily for 30 days. Rinse mouth out after each use, Disp: 1 each, Rfl: 5     "FREESTYLE LITE test strip, , Disp: , Rfl:     gabapentin (NEURONTIN) 600 MG tablet, Take 1 tablet by mouth Daily., Disp: , Rfl:     insulin glargine (LANTUS, SEMGLEE) 100 UNIT/ML injection, Inject 68 Units under the skin into the appropriate area as directed Every Night., Disp: , Rfl:     metoprolol succinate XL (TOPROL-XL) 50 MG 24 hr tablet, Take 1 tablet by mouth Daily., Disp: , Rfl:     olmesartan (BENICAR) 40 MG tablet, Take 1 tablet by mouth Daily., Disp: , Rfl:     pantoprazole (PROTONIX) 40 MG EC tablet, Take 1 tablet by mouth Daily., Disp: , Rfl:     spironolactone-hydrochlorothiazide (ALDACTAZIDE) 25-25 MG tablet, Take 0.75 tablets by mouth Daily., Disp: , Rfl:     Trulicity 0.75 MG/0.5ML solution pen-injector, , Disp: , Rfl:      Objective     Physical Exam  Vital Signs:   Obese, WDWN, Alert, NAD.    HEENT:  PERRL, EOMI.  OP, nares clear, no sinus tenderness  Neck:  Supple, no JVD, no thyromegaly.  Lymph: no axillary, cervical, supraclavicular lymphadenopathy noted bilaterally  Chest:  good aeration, clear to auscultation bilaterally, tympanic to percussion bilaterally, no work of breathing noted  CV: RRR, no MGR, pulses 2+, equal.  Abd:  Soft, NT, ND, + BS, no HSM  EXT:  no clubbing, no cyanosis, no edema, no joint tenderness  Neuro:  A&Ox3, CN grossly intact, no focal deficits.  Skin: No rashes or lesions noted.    /55 (BP Location: Left arm, Patient Position: Sitting, Cuff Size: Large Adult)   Pulse 68   Resp 20   Ht 149.9 cm (59.02\")   Wt 95.6 kg (210 lb 12.8 oz)   SpO2 97% Comment: room air  BMI 42.55 kg/mý         Result Review :   I have reviewed my last office visit note.  I have also reviewed pulmonary function test report and 6-minute walk test report dated from 7/19/2023.  See scanned reports.    Procedures:                   Assessment and Plan      Assessment:  1.  Chronic dyspnea. PFTs normal.   Alpha-1 with a normal genotype of M/M with level of 117.   2.  Chronic cough.  3.  " Intermittent wheezing.  4.  Peripheral eosinophilia.  5.  Question of severe persistent asthma with acute exacerbation. Alpha-1 came back with a normal genotype of M/M with level of 117.    6.  Obstructive sleep apnea on CPAP.  Patient is under the care of the sleep center.  7.  Elevated IgE level: 80.1.  8.  Morbid obesity: With BMI of 42.5.  9.  Seasonal allergies.  10. GERD.  Patient is on a PPI.  11.  Never smoker.      Plan:  1.  PFTs came back normal.  Will order a methacholine challenge test.  Order placed today.  2.  Patient is wanting an inhaler to use once daily instead of twice daily.  Will stop Breztri.  Will start patient on Trelegy 100 mcg 1 puff once daily.  Patient is advised rinse her mouth out after each use.  3.  Recommend patient restart Singulair, however patient declines medication at this time and states that she is on too many medications.  3.  Continue albuterol inhaler as needed.  4.  Will order pulmonary function test and a 6-minute walk test.  5.  For GERD,  patient to continue PPI.   Patient is also advised to sleep with the head of the bed elevated and do not eat 3-4 hours prior to bedtime.  6.  Continue Zyrtec for seasonal allergies.  Will refer patient to allergist for allergy testing.  7.  Continue CPAP at night and with naps at current settings and clean mask and tubing daily.  Follow-up with the sleep center as scheduled.  8.  I spent counseled the patient on diet and exercise. Patient's BMI and weight were discussed.  The patient was counseled on initiating and intensifying attempts to lose weight.  Patient was counseled about the risks of obesity and advised to decrease caloric intake by 500 calories a day, eat three small meals a day and advised to minimize snacking.  I recommended 30-60 minutes of daily exercise.  9.  Vaccination status: patient reports they are up-to-date with flu, pneumonia, and Covid vaccines.  Patient is advised to continue to follow CDC recommendations  such as social distancing wearing a mask and washing hands for at least 20 seconds.  10.  Smoking status: Never smoker.  11.  Patient to call the office, 911, or go to the ER with new or worsening symptoms.  12.  Follow-up in 6 to 8 weeks, sooner if needed.             Follow Up   Return for 6-8 weeks.  Patient was given instructions and counseling regarding her condition or for health maintenance advice. Please see specific information pulled into the AVS if appropriate.

## 2023-08-10 ENCOUNTER — OFFICE VISIT (OUTPATIENT)
Dept: PULMONOLOGY | Facility: CLINIC | Age: 83
End: 2023-08-10
Payer: MEDICARE

## 2023-08-10 VITALS
DIASTOLIC BLOOD PRESSURE: 55 MMHG | OXYGEN SATURATION: 97 % | HEIGHT: 59 IN | BODY MASS INDEX: 42.5 KG/M2 | HEART RATE: 68 BPM | RESPIRATION RATE: 20 BRPM | WEIGHT: 210.8 LBS | SYSTOLIC BLOOD PRESSURE: 104 MMHG

## 2023-08-10 DIAGNOSIS — J30.2 SEASONAL ALLERGIES: ICD-10-CM

## 2023-08-10 DIAGNOSIS — G47.33 OSA ON CPAP: ICD-10-CM

## 2023-08-10 DIAGNOSIS — K76.0 FATTY LIVER: ICD-10-CM

## 2023-08-10 DIAGNOSIS — R06.09 CHRONIC DYSPNEA: Primary | ICD-10-CM

## 2023-08-10 DIAGNOSIS — R05.3 CHRONIC COUGH: ICD-10-CM

## 2023-08-10 DIAGNOSIS — R76.8 ELEVATED IGE LEVEL: ICD-10-CM

## 2023-08-10 DIAGNOSIS — D72.19 PERIPHERAL EOSINOPHILIA: ICD-10-CM

## 2023-08-10 DIAGNOSIS — J45.51 SEVERE PERSISTENT ASTHMA WITH ACUTE EXACERBATION: ICD-10-CM

## 2023-08-10 DIAGNOSIS — Z99.89 OSA ON CPAP: ICD-10-CM

## 2023-08-10 DIAGNOSIS — R06.2 WHEEZING: ICD-10-CM

## 2023-08-10 PROCEDURE — 3074F SYST BP LT 130 MM HG: CPT | Performed by: NURSE PRACTITIONER

## 2023-08-10 PROCEDURE — 99214 OFFICE O/P EST MOD 30 MIN: CPT | Performed by: NURSE PRACTITIONER

## 2023-08-10 PROCEDURE — 3078F DIAST BP <80 MM HG: CPT | Performed by: NURSE PRACTITIONER

## 2023-08-10 PROCEDURE — 1159F MED LIST DOCD IN RCRD: CPT | Performed by: NURSE PRACTITIONER

## 2023-08-10 PROCEDURE — 1160F RVW MEDS BY RX/DR IN RCRD: CPT | Performed by: NURSE PRACTITIONER

## 2023-08-10 RX ORDER — CETIRIZINE HYDROCHLORIDE 10 MG/1
10 TABLET ORAL DAILY
Qty: 90 TABLET | Refills: 3 | Status: SHIPPED | OUTPATIENT
Start: 2023-08-10 | End: 2023-11-08

## 2023-08-21 RX ORDER — METHACHOLINE CHLORIDE 3 MG/3 ML
3 VIAL, NEBULIZER (ML) INHALATION
Status: COMPLETED | OUTPATIENT
Start: 2023-08-25 | End: 2023-08-25

## 2023-08-21 RX ORDER — METHACHOLINE CHLORIDE 48 MG/3 ML
3 VIAL, NEBULIZER (ML) INHALATION
Status: ACTIVE | OUTPATIENT
Start: 2023-08-25 | End: 2023-08-25

## 2023-08-21 RX ORDER — METHACHOLINE CHLORIDE 0 MG/3 ML
3 VIAL, NEBULIZER (ML) INHALATION
Status: DISPENSED | OUTPATIENT
Start: 2023-08-21 | End: 2023-08-21

## 2023-08-21 RX ORDER — METHACHOLINE CHLORIDE 0.75/3ML
3 VIAL, NEBULIZER (ML) INHALATION
Status: COMPLETED | OUTPATIENT
Start: 2023-08-25 | End: 2023-08-25

## 2023-08-21 RX ORDER — ALBUTEROL SULFATE 2.5 MG/3ML
2.5 SOLUTION RESPIRATORY (INHALATION) ONCE AS NEEDED
Status: COMPLETED | OUTPATIENT
Start: 2023-08-25 | End: 2023-08-25

## 2023-08-21 RX ORDER — ALBUTEROL SULFATE 2.5 MG/3ML
2.5 SOLUTION RESPIRATORY (INHALATION) ONCE
Status: DISCONTINUED | OUTPATIENT
Start: 2023-08-25 | End: 2023-08-26 | Stop reason: HOSPADM

## 2023-08-21 RX ORDER — METHACHOLINE CHLORIDE 12 MG/3 ML
3 VIAL, NEBULIZER (ML) INHALATION
Status: COMPLETED | OUTPATIENT
Start: 2023-08-25 | End: 2023-08-25

## 2023-08-21 RX ORDER — METHACHOLINE CHLORIDE 0.1875/3ML
3 VIAL, NEBULIZER (ML) INHALATION
Status: COMPLETED | OUTPATIENT
Start: 2023-08-25 | End: 2023-08-25

## 2023-08-21 RX ORDER — SODIUM CHLORIDE FOR INHALATION 0.9 %
3 VIAL, NEBULIZER (ML) INHALATION ONCE AS NEEDED
Status: COMPLETED | OUTPATIENT
Start: 2023-08-25 | End: 2023-08-25

## 2023-08-25 ENCOUNTER — TRANSCRIBE ORDERS (OUTPATIENT)
Dept: ADMINISTRATIVE | Facility: HOSPITAL | Age: 83
End: 2023-08-25
Payer: MEDICARE

## 2023-08-25 ENCOUNTER — HOSPITAL ENCOUNTER (OUTPATIENT)
Dept: RESPIRATORY THERAPY | Facility: HOSPITAL | Age: 83
Discharge: HOME | End: 2023-08-25
Payer: MEDICARE

## 2023-08-25 DIAGNOSIS — R06.09 CHRONIC DYSPNEA: ICD-10-CM

## 2023-08-25 DIAGNOSIS — Z78.0 POST-MENOPAUSAL: Primary | ICD-10-CM

## 2023-08-25 DIAGNOSIS — R76.8 ELEVATED IGE LEVEL: ICD-10-CM

## 2023-08-25 DIAGNOSIS — D72.19 PERIPHERAL EOSINOPHILIA: ICD-10-CM

## 2023-08-25 DIAGNOSIS — K76.0 FATTY LIVER: ICD-10-CM

## 2023-08-25 DIAGNOSIS — R06.2 WHEEZING: ICD-10-CM

## 2023-08-25 DIAGNOSIS — J45.51 SEVERE PERSISTENT ASTHMA WITH ACUTE EXACERBATION: ICD-10-CM

## 2023-08-25 DIAGNOSIS — Z99.89 OSA ON CPAP: ICD-10-CM

## 2023-08-25 DIAGNOSIS — J30.2 SEASONAL ALLERGIES: ICD-10-CM

## 2023-08-25 DIAGNOSIS — G47.33 OSA ON CPAP: ICD-10-CM

## 2023-08-25 DIAGNOSIS — R05.3 CHRONIC COUGH: ICD-10-CM

## 2023-08-25 PROCEDURE — 94070 EVALUATION OF WHEEZING: CPT

## 2023-08-25 RX ADMIN — Medication 0.75 MG: at 08:36

## 2023-08-25 RX ADMIN — ALBUTEROL SULFATE 2.5 MG: 2.5 SOLUTION RESPIRATORY (INHALATION) at 08:58

## 2023-08-25 RX ADMIN — Medication 3 MG: at 08:44

## 2023-08-25 RX ADMIN — Medication 12 MG: at 08:52

## 2023-08-25 RX ADMIN — ISODIUM CHLORIDE 3 ML: 0.03 SOLUTION RESPIRATORY (INHALATION) at 08:19

## 2023-08-25 RX ADMIN — Medication 0.19 MG: at 08:31

## 2023-10-08 NOTE — PATIENT INSTRUCTIONS
Asthma, Adult    Asthma is a condition that causes swelling and narrowing of the airways. These are the passages that lead from the nose and mouth down into the lungs. When asthma symptoms get worse it is called an asthma attack or flare. This can make it hard to breathe. Asthma flares can range from minor to life-threatening. There is no cure for asthma, but medicines and lifestyle changes can help to control it.  What are the causes?  It is not known exactly what causes asthma, but certain things can cause asthma symptoms to get worse (triggers).  What can trigger an asthma attack?  Cigarette smoke.  Mold.  Dust.  Your pet's skin flakes (dander).  Cockroaches.  Pollen.  Air pollution (like household , wood smoke, smog, or chemical odors).  What are the signs or symptoms?  Trouble breathing (shortness of breath).  Coughing.  Making high-pitched whistling sounds when you breathe, most often when you breathe out (wheezing).  Chest tightness.  Tiredness with little activity.  Poor exercise tolerance.  How is this treated?  Controller medicines that help prevent asthma symptoms.  Fast-acting reliever or rescue medicines. These give short-term relief of asthma symptoms.  Allergy medicines if your attacks are brought on by allergens.  Medicines to help control the body's defense (immune) system.  Staying away from the things that cause asthma attacks.  Follow these instructions at home:  Avoiding triggers in your home  Do not allow anyone to smoke in your home.  Limit use of fireplaces and wood stoves.  Get rid of pests (such as roaches and mice) and their droppings.  Keep your home clean.  Clean your floors. Dust regularly. Use cleaning products that do not smell.  Wash bed sheets and blankets every week in hot water. Dry them in a dryer.  Have someone vacuum when you are not home.  Change your heating and air conditioning filters often.  Use blankets that are made of polyester or cotton.  General  instructions  Take over-the-counter and prescription medicines only as told by your doctor.  Do not smoke or use any products that contain nicotine or tobacco. If you need help quitting, ask your doctor.  Stay away from secondhand smoke.  Avoid doing things outdoors when allergen counts are high and when air quality is low.  Warm up before you exercise. Take time to cool down after exercise.  Use a peak flow meter as told by your doctor. A peak flow meter is a tool that measures how well your lungs are working.  Keep track of the peak flow meter's readings. Write them down.  Follow your asthma action plan. This is a written plan for taking care of your asthma and treating your attacks.  Make sure you get all the shots (vaccines) that your doctor recommends. Ask your doctor about a flu shot and a pneumonia shot.  Keep all follow-up visits.  Contact a doctor if:  You have wheezing, shortness of breath, or a cough even while taking medicine to prevent attacks.  The mucus you cough up (sputum) is thicker than usual.  The mucus you cough up changes from clear or white to yellow, green, gray, or is bloody.  You have problems from the medicine you are taking, such as:  A rash.  Itching.  Swelling.  Trouble breathing.  You need reliever medicines more than 2-3 times a week.  Your peak flow reading is still at 50-79% of your personal best after following the action plan for 1 hour.  You have a fever.  Get help right away if:  You seem to be worse and are not responding to medicine during an asthma attack.  You are short of breath even at rest.  You get short of breath when doing very little activity.  You have trouble eating, drinking, or talking.  You have chest pain or tightness.  You have a fast heartbeat.  Your lips or fingernails start to turn blue.  You are light-headed or dizzy, or you faint.  Your peak flow is less than 50% of your personal best.  You feel too tired to breathe normally.  These symptoms may be an  emergency. Get help right away. Call 911.  Do not wait to see if the symptoms will go away.  Do not drive yourself to the hospital.  Summary  Asthma is a long-term (chronic) condition in which the airways get tight and narrow. An asthma attack can make it hard to breathe.  Asthma cannot be cured, but medicines and lifestyle changes can help control it.  Make sure you understand how to avoid triggers and how and when to use your medicines.  Avoid things that can cause allergy symptoms (allergens). These include animal skin flakes (dander) and pollen from trees or grass.  Avoid things that pollute the air. These may include household , wood smoke, smog, or chemical odors.  This information is not intended to replace advice given to you by your health care provider. Make sure you discuss any questions you have with your health care provider.  Document Revised: 09/26/2022 Document Reviewed: 09/26/2022  [employer]vier Patient Education c 2023 [employer]

## 2023-10-08 NOTE — PROGRESS NOTES
Primary Care Provider  Cindy Sal MD     Referring Provider  No ref. provider found     Chief Complaint  Follow-up (8 weeks), Asthma, Cough, and Shortness of Breath    Subjective          History of Presenting Illness  Patient is an 83-year-old female, patient Dr. Bowman's who presents management dyspnea who presents for follow-up visit today.   Patient states that she does get short of breath that is worse with exertion, mild in severity, and improved with rest.   Patient states that she is only using a Xopenex inhaler as needed.  Patient states that she is having to use her Xopenex inhaler on a daily basis.  Since last office visit patient had a methacholine challenge test completed on 8/25/2023.  Report states presence of bronchoprovocation.  Findings can be seen with asthma. Patient states that she is using her CPAP machine when she sleeps and receives her supplies through [employer] that is managed by Dr. Randall. Patient states that she is taking Singulair and Zyrtec for seasonal allergies.  Patient states that she is scheduled to see the allergist next week for allergy testing.  Patient states that she is under the care of cardiologist, Dr. Ghassan Jimenez.  Patient denies any morning headaches or excessive daytime sleepiness.  Patient denies fever, chills, night sweats, swollen glands in the head and neck, unintentional weight loss, hemoptysis, purulent sputum production, dysphagia, chest pain, palpitations, chest tightness, abdominal pain, nausea, vomiting, and diarrhea.  Patient also denies any myalgias, changes in sense of taste and/or smell, sore throat, any other coronavirus or flu-like symptoms.  Patient denies any leg swelling, orthopnea, paroxysmal nocturnal dyspnea.  Patient is able to perform activities of daily living.        Review of Systems   Constitutional:  Positive for fatigue. Negative for activity change, appetite change, chills, diaphoresis, fever, unexpected weight gain and unexpected  weight loss.        Negative for Insomnia   HENT:  Negative for congestion (Nasal), mouth sores, nosebleeds, postnasal drip, sore throat, swollen glands and trouble swallowing.         Negative for Thrush  Negative for Hoarseness  Negative for Allergies/Hay Fever  Negative for Recent Head injury  Negative for Ear Fullness  Negative for Nasal or Sinus pain  Negative for Dry lips  Negative for Nasal discharge   Respiratory:  Positive for cough (intermittent), shortness of breath and wheezing (intermittent). Negative for apnea and chest tightness.         Negative for Hemoptysis  Negative for Pleuritic pain   Cardiovascular:  Negative for chest pain, palpitations and leg swelling.        Negative for Claudication  Negative for Cyanosis  Negative for Dyspnea on exertion   Gastrointestinal:  Negative for abdominal pain, diarrhea, nausea, vomiting and GERD.   Musculoskeletal:  Negative for joint swelling and myalgias.        Negative for Joint pain  Negative for Joint stiffness   Skin:  Negative for color change, dry skin, pallor and rash.   Neurological:  Negative for syncope, weakness and headache.   Hematological:  Negative for adenopathy. Does not bruise/bleed easily.        Family History   Problem Relation Age of Onset    Colon polyps Father     Colon polyps Other     Malig Hyperthermia Neg Hx         Social History     Socioeconomic History    Marital status:    Tobacco Use    Smoking status: Never     Passive exposure: Past    Smokeless tobacco: Never   Vaping Use    Vaping Use: Never used   Substance and Sexual Activity    Alcohol use: Never    Drug use: Never    Sexual activity: Defer        Past Medical History:   Diagnosis Date    Atrial fibrillation     Back pain     Breast cancer     Cancer     breast    Cirrhosis of liver not due to alcohol     Diabetes mellitus     GERD (gastroesophageal reflux disease)     Hypertension     Kidney stone     Neuropathy     Sleep apnea     CPAP        Immunization  History   Administered Date(s) Administered    COVID-19 (PFIZER) Purple Cap Monovalent 02/13/2021, 03/06/2021, 09/30/2021    Covid-19 (Pfizer) Gray Cap Monovalent 05/16/2022    Flu Vaccine Split Quad 10/09/2015    Fluzone (or Fluarix & Flulaval for VFC) >6mos 10/09/2015    Fluzone High Dose =>65 Years ([facility] ONLY) 08/23/2012    Fluzone Quad >6mos (Multi-dose) 09/12/2011, 08/22/2013, 09/15/2016, 09/14/2017, 09/18/2018    Influenza Injectable Mdck Pf Quad 10/13/2020, 10/18/2021    Influenza, Unspecified 10/13/2020, 10/18/2021    Pneumococcal Conjugate 13-Valent (PCV13) 03/15/2016    Pneumococcal Conjugate 20-Valent (PCV20) 06/07/2023       Allergies   Allergen Reactions    Aricept [Donepezil] Unknown - High Severity    Diphenhydramine Unknown - Low Severity    Lisinopril Swelling    Nsaids Unknown - High Severity and Unknown - Low Severity    Pseudoephedrine Swelling          Current Outpatient Medications:     apixaban (ELIQUIS) 5 MG tablet tablet, Take 1 tablet by mouth 2 (Two) Times a Day., Disp: , Rfl:     B-D UF III MINI PEN NEEDLES 31G X 5 MM misc, , Disp: , Rfl:     cetirizine (zyrTEC) 10 MG tablet, Take 1 tablet by mouth Daily for 90 days., Disp: 90 tablet, Rfl: 3    FREESTYLE LITE test strip, , Disp: , Rfl:     gabapentin (NEURONTIN) 600 MG tablet, Take 1 tablet by mouth Daily., Disp: , Rfl:     insulin glargine (LANTUS, SEMGLEE) 100 UNIT/ML injection, Inject 68 Units under the skin into the appropriate area as directed Every Night., Disp: , Rfl:     levalbuterol (XOPENEX HFA) 45 MCG/ACT inhaler, Inhale 2 puffs every 4 to 6 hours as needed for coughing, wheezing, shortness of air. Use with spacer, Disp: , Rfl:     metoprolol succinate XL (TOPROL-XL) 50 MG 24 hr tablet, Take 1 tablet by mouth Daily., Disp: , Rfl:     montelukast (SINGULAIR) 10 MG tablet, , Disp: , Rfl:     olmesartan (BENICAR) 40 MG tablet, Take 1 tablet by mouth Daily., Disp: , Rfl:     pantoprazole (PROTONIX) 40 MG EC tablet, Take 1 tablet  "by mouth Daily., Disp: , Rfl:     spironolactone-hydrochlorothiazide (ALDACTAZIDE) 25-25 MG tablet, Take 0.75 tablets by mouth Daily., Disp: , Rfl:     Trulicity 0.75 MG/0.5ML solution pen-injector, , Disp: , Rfl:     cholecalciferol (VITAMIN D3) 25 MCG (1000 UT) tablet, Daily. (Patient not taking: Reported on 10/10/2023), Disp: , Rfl:     erythromycin (ROMYCIN) 5 MG/GM ophthalmic ointment, erythromycin 5 mg/gram (0.5 %) eye ointment (Patient not taking: Reported on 7/6/2023), Disp: , Rfl:     fluticasone-salmeterol (Advair HFA) 230-21 MCG/ACT inhaler, Inhale 2 puffs 2 (Two) Times a Day for 90 days. Rinse mouth out after each use, Disp: 3 each, Rfl: 3     Objective     Physical Exam  Vital Signs:   Obese, WDWN, Alert, NAD.    HEENT:  PERRL, EOMI.  OP, nares clear, no sinus tenderness  Neck:  Supple, no JVD, no thyromegaly.  Lymph: no axillary, cervical, supraclavicular lymphadenopathy noted bilaterally  Chest:  good aeration, clear to auscultation bilaterally, tympanic to percussion bilaterally, no work of breathing noted  CV: RRR, no MGR, pulses 2+, equal.  Abd:  Soft, NT, ND, + BS, no HSM  EXT:  no clubbing, no cyanosis, no edema, no joint tenderness  Neuro:  A&Ox3, CN grossly intact, no focal deficits.  Skin: No rashes or lesions noted.    /67 (BP Location: Left arm, Patient Position: Sitting, Cuff Size: Adult)   Pulse 62   Temp 98 øF (36.7 øC) (Temporal)   Resp 18   Ht 149.9 cm (59\")   Wt 96.6 kg (213 lb)   SpO2 98% Comment: Room air  BMI 43.02 kg/mý         Result Review :   I have reviewed my last office visit note.  I also reviewed methacholine challenge test report completed on 8/25/2023.  See scanned report.    Procedures:           Assessment and Plan      Assessment:  1.  Asthma.  Methacholine challenge test consistent with asthma.  2. Chronic dyspnea. PFTs normal.  Methacholine challenge test consistent with asthma.   Alpha-1 with a normal genotype of M/M with level of 117.   3.  Chronic " cough.  4.  Intermittent wheezing.  5.  Peripheral eosinophilia.  6.  Obstructive sleep apnea on CPAP.  Patient is under the care of the sleep center with Dr. Randall.  7.  Elevated IgE level: 80.1.  8.  Morbid obesity: With BMI of 42.5.  9.  Seasonal allergies.  10. GERD.  Patient is on a PPI.  11.  Proximal atrial fibrillation: Patient is under the care of Dr. Ghassan Jimenez.  12.  Never smoker.        Plan:  1.  Methacholine challenge test consistent with Asthma.  2.  Will start patient on Advair 230 mcg 2 puffs twice daily.  Patient is advised to rinse her mouth out after each use.  3.  Continue Xopenex inhaler as needed.  Asthma action plan discussed with the patient in the office today.  4.  Continue Singulair and Zyrtec and follow-up with allergist as scheduled for allergy testing.  5.  For GERD,  patient to continue PPI.   Patient is also advised to sleep with the head of the bed elevated and do not eat 3-4 hours prior to bedtime.  6.  Continue CPAP at night and with naps at current settings and clean mask and tubing daily.  Follow-up with the sleep center as scheduled.  7.  I spent counseled the patient on diet and exercise. Patient's BMI and weight were discussed.  The patient was counseled on initiating and intensifying attempts to lose weight.  Patient was counseled about the risks of obesity and advised to decrease caloric intake by 500 calories a day, eat three small meals a day and advised to minimize snacking.  I recommended 30-60 minutes of daily exercise.  8.  Vaccination status: patient reports they are up-to-date with flu, pneumonia, and Covid vaccines.  Patient is advised to continue to follow CDC recommendations such as social distancing wearing a mask and washing hands for at least 20 seconds.  9.  Smoking status: Never smoker.  10.  Patient to call the office, 911, or go to the ER with new or worsening symptoms.  11.  Follow-up with cardiologist as scheduled.  12.  Follow-up in 6 to 8 weeks,  sooner if needed.             Follow Up   Return for 6-8 weeks.  Patient was given instructions and counseling regarding her condition or for health maintenance advice. Please see specific information pulled into the AVS if appropriate.

## 2023-10-10 ENCOUNTER — OFFICE VISIT (OUTPATIENT)
Dept: PULMONOLOGY | Facility: CLINIC | Age: 83
End: 2023-10-10
Payer: MEDICARE

## 2023-10-10 VITALS
TEMPERATURE: 98 F | BODY MASS INDEX: 42.94 KG/M2 | RESPIRATION RATE: 18 BRPM | HEIGHT: 59 IN | DIASTOLIC BLOOD PRESSURE: 67 MMHG | OXYGEN SATURATION: 98 % | SYSTOLIC BLOOD PRESSURE: 143 MMHG | WEIGHT: 213 LBS | HEART RATE: 62 BPM

## 2023-10-10 DIAGNOSIS — R06.2 WHEEZING: ICD-10-CM

## 2023-10-10 DIAGNOSIS — J45.909 ASTHMA, UNSPECIFIED ASTHMA SEVERITY, UNSPECIFIED WHETHER COMPLICATED, UNSPECIFIED WHETHER PERSISTENT: Primary | ICD-10-CM

## 2023-10-10 DIAGNOSIS — G47.33 OSA ON CPAP: ICD-10-CM

## 2023-10-10 DIAGNOSIS — D72.19 PERIPHERAL EOSINOPHILIA: ICD-10-CM

## 2023-10-10 DIAGNOSIS — R05.3 CHRONIC COUGH: ICD-10-CM

## 2023-10-10 DIAGNOSIS — J30.2 SEASONAL ALLERGIES: ICD-10-CM

## 2023-10-10 DIAGNOSIS — R06.09 CHRONIC DYSPNEA: ICD-10-CM

## 2023-10-10 DIAGNOSIS — R76.8 ELEVATED IGE LEVEL: ICD-10-CM

## 2023-10-10 DIAGNOSIS — Z78.9 NEVER SMOKED CIGARETTES: ICD-10-CM

## 2023-10-10 DIAGNOSIS — I48.0 PAROXYSMAL ATRIAL FIBRILLATION: Chronic | ICD-10-CM

## 2023-10-10 PROCEDURE — 3077F SYST BP >= 140 MM HG: CPT | Performed by: NURSE PRACTITIONER

## 2023-10-10 PROCEDURE — 1159F MED LIST DOCD IN RCRD: CPT | Performed by: NURSE PRACTITIONER

## 2023-10-10 PROCEDURE — 99214 OFFICE O/P EST MOD 30 MIN: CPT | Performed by: NURSE PRACTITIONER

## 2023-10-10 PROCEDURE — 1160F RVW MEDS BY RX/DR IN RCRD: CPT | Performed by: NURSE PRACTITIONER

## 2023-10-10 PROCEDURE — 3078F DIAST BP <80 MM HG: CPT | Performed by: NURSE PRACTITIONER

## 2023-10-10 RX ORDER — MONTELUKAST SODIUM 10 MG/1
TABLET ORAL
COMMUNITY
Start: 2023-09-25

## 2023-10-10 RX ORDER — LEVALBUTEROL TARTRATE 45 UG/1
AEROSOL, METERED ORAL
COMMUNITY
Start: 2023-09-28

## 2023-10-10 RX ORDER — FLUTICASONE PROPIONATE AND SALMETEROL XINAFOATE 230; 21 UG/1; UG/1
2 AEROSOL, METERED RESPIRATORY (INHALATION)
Qty: 3 EACH | Refills: 3 | Status: SHIPPED | OUTPATIENT
Start: 2023-10-10 | End: 2024-01-08

## 2023-11-01 ENCOUNTER — OFFICE VISIT (OUTPATIENT)
Dept: SLEEP MEDICINE | Facility: HOSPITAL | Age: 83
End: 2023-11-01
Payer: MEDICARE

## 2023-11-01 VITALS
HEART RATE: 69 BPM | SYSTOLIC BLOOD PRESSURE: 145 MMHG | OXYGEN SATURATION: 98 % | DIASTOLIC BLOOD PRESSURE: 46 MMHG | WEIGHT: 210 LBS | BODY MASS INDEX: 42.33 KG/M2 | HEIGHT: 59 IN

## 2023-11-01 DIAGNOSIS — E66.01 MORBID OBESITY WITH BMI OF 40.0-44.9, ADULT: ICD-10-CM

## 2023-11-01 DIAGNOSIS — G47.33 OSA ON CPAP: Primary | ICD-10-CM

## 2023-11-01 NOTE — PROGRESS NOTES
"  [address] [address]  Phone: [phone]  Fax: [phone]      SLEEP CLINIC FOLLOW UP PROGRESS NOTE.    Bella Ritter  [id number]   1940  83 y.o.  female      PCP: Cindy Sal MD      Date of visit: 11/1/2023    Chief Complaint   Patient presents with    Sleep Apnea    Obesity       HPI:  This is a 83 y.o. years old patient is here for the management of obstructive sleep apnea.  Sleep apnea is severe in severity with a AHI of 38/hr. Patient is using positive airway pressure therapy with CPAP 13 cm and the symptoms of snoring, non-restorative sleep and daytime excessive sleepiness have improved significantly on the therapy. Normally goes to bed at 12 midnight and wakes up at 11 AM.  The patient wakes up 2 time(s) during the night and has no problem going back to sleep.  Feels refreshed after waking up.  Patient also denies headaches and nasal congestion.   Got her DreamStation 2    Medications and allergies are reviewed by me and documented in the encounter.     SOCIAL (habits pertaining to sleep medicine)  History tobacco use:No   History of alcohol use: 0 per week  Caffeine use: 0     REVIEW OF SYSTEMS:   [place] Sleepiness Scale :Total score: 5   Nasal congestion:No   Dry mouth/nose:No   Post nasal drip; No   Acid reflux/Heartburn:No   Abd bloating:No   Morning headache:No   Anxiety:No   Depression:No    PHYSICAL EXAMINATION:  CONSTITUTIONAL:  Vitals:    11/01/23 1500   BP: 145/46   Pulse: 69   SpO2: 98%   Weight: 95.3 kg (210 lb)   Height: 149.9 cm (59.02\")    Body mass index is 42.39 kg/m².   NOSE: nasal passages are clear, No deformities noted   RESP SYSTEM: Not in any respiratory distress, no chest deformities noted,   CARDIOVASULAR: No edema noted  NEURO: Oriented x 3, gait normal,  Mood and affect appeared appropriate      Data reviewed:  The Smart card downloaded on 11/1/2023 has been reviewed independently by me for compliance " and discussed the data with the patient.   Compliance; 100%  More than 4 hr use, 100%  Average use of the device 9 hours and 15 minutes per night  Residual AHI: 4.8 /hr (goal < 5.0 /hr)  Mask type: Nasal mask  Device: DreamStation 2  DME: [employer]      ASSESSMENT AND PLAN:  Obstructive sleep apnea ( G 47.33).  The symptoms of sleep apnea have improved with the device and the treatment.  Patient's compliance with the device is excellent for treatment of sleep apnea.  I have independently reviewed the smart card down load and discussed with the patient the download data and encouarged the patient to continue to use the device.The residual AHI is acceptable. The device is benefiting the patient and the device is medically necessary.  Without proper control of sleep apnea and good compliance there is a increased risk for hypertension, diabetes mellitus and nonrestorative sleep with hypersomnia which can increase risk for motor vehicle accidents.  Untreated sleep apnea is also a risk factor for development of atrial fibrillation, pulmonary hypertension and stroke. The patient is also instructed to get the supplies from the [employer] and and change them on a regular basis.  A prescription for supplies has been sent to the [employer].  I have also discussed the good sleep hygiene habits and adequate amount of sleep needed for good health.  Obesity  3 with BMI is Body mass index is 42.39 kg/m².. I have discuss the relationship between the weight and sleep apnea. The benefit of weight loss in reducing severity of sleep apnea was discussed. Discussed diet and exercise with the patient to achieve ideal BMI.   Return in about 1 year (around 11/1/2024) for with smart card down load. . Patient's questions were answered.      Gil Randall MD  Sleep Medicine.  Medical Director, [facility]  11/1/2023 ,

## 2023-11-07 ENCOUNTER — HOSPITAL ENCOUNTER (OUTPATIENT)
Dept: BONE DENSITY | Facility: HOSPITAL | Age: 83
Discharge: HOME | End: 2023-11-07
Admitting: INTERNAL MEDICINE
Payer: MEDICARE

## 2023-11-07 DIAGNOSIS — Z78.0 POST-MENOPAUSAL: ICD-10-CM

## 2023-11-07 PROCEDURE — 77080 DXA BONE DENSITY AXIAL: CPT

## 2023-11-11 NOTE — PROGRESS NOTES
Primary Care Provider  Cindy Sal MD     Referring Provider  No ref. provider found     Chief Complaint  Asthma, Cough, Shortness of Breath, Wheezing, and Follow-up    Subjective          History of Presenting Illness  Patient is an 83-year-old female, patient Dr. Bowman's who presents management dyspnea who presents for follow-up visit today.   Patient states that she does get short of breath that is worse with exertion, mild in severity, and improved with rest.    Last office visit patient reported that she was having use her Xopenex inhaler on a daily basis.  Patient was started on Advair.  Patient states that since starting Advair her shortness of breath and cough have improved.  Patient states that she has not had to use her Xopenex inhaler since starting Advair inhaler.  Patient states that she is also taking Zyrtec and Singulair as prescribed.  Patient states that she did see an allergist and did have allergy testing and it was recommended that she start allergy shots, however patient states that she has discussed with her cardiologist that she is not able to take Toprol if she does start allergy shots.  Patient states that she is not sure if she is going to start allergy shots and is going to discuss with both her allergist and her cardiologist.  Patient is on Protonix for reflux.  Patient states that she is using her CPAP machine when she sleeps and receives her supplies through [facility] that is managed by Dr. Randall. Patient states that she is taking Singulair and Zyrtec for seasonal allergies.   Patient states that she is under the care of cardiologist, Dr. Ghassan Jimenez. Patient denies fever, chills, night sweats, swollen glands in the head and neck, unintentional weight loss, hemoptysis, purulent sputum production, dysphagia, chest pain, palpitations, chest tightness, abdominal pain, nausea, vomiting, and diarrhea.  Patient also denies any myalgias, changes in sense of taste and/or smell, sore  throat, any other coronavirus or flu-like symptoms.  Patient denies any leg swelling, orthopnea, paroxysmal nocturnal dyspnea.  Patient is able to perform activities of daily living.        Review of Systems   Constitutional:  Negative for activity change, appetite change, chills, diaphoresis, fatigue, fever, unexpected weight gain and unexpected weight loss.        Negative for Insomnia   HENT:  Negative for congestion (Nasal), mouth sores, nosebleeds, postnasal drip, sore throat, swollen glands and trouble swallowing.         Negative for Thrush  Negative for Hoarseness  Negative for Allergies/Hay Fever  Negative for Recent Head injury  Negative for Ear Fullness  Negative for Nasal or Sinus pain  Negative for Dry lips  Negative for Nasal discharge   Respiratory:  Positive for cough (Improved since starting Advair per patient report), shortness of breath (Improved since starting Advair per patient report) and wheezing (Improved starting Advair per patient report). Negative for apnea and chest tightness.         Negative for Hemoptysis  Negative for Pleuritic pain   Cardiovascular:  Negative for chest pain, palpitations and leg swelling.        Negative for Claudication  Negative for Cyanosis  Negative for Dyspnea on exertion   Gastrointestinal:  Negative for abdominal pain, diarrhea, nausea, vomiting and GERD.   Musculoskeletal:  Negative for joint swelling and myalgias.        Negative for Joint pain  Negative for Joint stiffness   Skin:  Negative for color change, dry skin, pallor and rash.   Neurological:  Negative for syncope, weakness and headache.   Hematological:  Negative for adenopathy. Does not bruise/bleed easily.        Family History   Problem Relation Age of Onset    Colon polyps Father     Colon polyps Other     Malig Hyperthermia Neg Hx         Social History     Socioeconomic History    Marital status:    Tobacco Use    Smoking status: Never     Passive exposure: Past    Smokeless tobacco:  Never   Vaping Use    Vaping Use: Never used   Substance and Sexual Activity    Alcohol use: Never    Drug use: Never    Sexual activity: Defer        Past Medical History:   Diagnosis Date    Asthma     Atrial fibrillation     Back pain     Breast cancer     Cancer     breast    Cirrhosis of liver not due to alcohol     Diabetes mellitus     GERD (gastroesophageal reflux disease)     Hypertension     Kidney stone     Neuropathy     Sleep apnea     CPAP        Immunization History   Administered Date(s) Administered    COVID-19 (PFIZER) Purple Cap Monovalent 02/13/2021, 03/06/2021, 09/30/2021    Covid-19 (Pfizer) Gray Cap Monovalent 05/16/2022    Flu Vaccine Split Quad 10/09/2015    Fluzone (or Fluarix & Flulaval for VFC) >6mos 10/09/2015    Fluzone High Dose =>65 Years ([facility] ONLY) 08/23/2012    Fluzone Quad >6mos (Multi-dose) 09/12/2011, 08/22/2013, 09/15/2016, 09/14/2017, 09/18/2018    Influenza Injectable Mdck Pf Quad 10/13/2020, 10/18/2021    Influenza, Unspecified 10/13/2020, 10/18/2021    PEDS-Pneumococcal Conjugate (PCV7) 06/07/2023    Pneumococcal Conjugate 13-Valent (PCV13) 03/15/2016    Pneumococcal Conjugate 20-Valent (PCV20) 06/07/2023       Allergies   Allergen Reactions    Aricept [Donepezil] Unknown - High Severity    Diphenhydramine Unknown - Low Severity    Lisinopril Swelling    Nsaids Unknown - High Severity and Unknown - Low Severity    Pseudoephedrine Swelling          Current Outpatient Medications:     apixaban (ELIQUIS) 5 MG tablet tablet, Take 1 tablet by mouth 2 (Two) Times a Day., Disp: , Rfl:     cetirizine (zyrTEC) 10 MG tablet, Take 1 tablet by mouth Daily for 90 days., Disp: 90 tablet, Rfl: 3    fluticasone-salmeterol (Advair HFA) 230-21 MCG/ACT inhaler, Inhale 2 puffs 2 (Two) Times a Day for 90 days. Rinse mouth out after each use, Disp: 3 each, Rfl: 3    gabapentin (NEURONTIN) 600 MG tablet, Take 1 tablet by mouth Daily., Disp: , Rfl:     insulin glargine (LANTUS, SEMGLEE) 100  "UNIT/ML injection, Inject 68 Units under the skin into the appropriate area as directed Every Night., Disp: , Rfl:     levalbuterol (XOPENEX HFA) 45 MCG/ACT inhaler, Inhale 2 puffs every 4 to 6 hours as needed for coughing, wheezing, shortness of air. Use with spacer, Disp: , Rfl:     metoprolol succinate XL (TOPROL-XL) 50 MG 24 hr tablet, Take 1 tablet by mouth Daily., Disp: , Rfl:     montelukast (SINGULAIR) 10 MG tablet, , Disp: , Rfl:     olmesartan (BENICAR) 40 MG tablet, Take 1 tablet by mouth Daily., Disp: , Rfl:     pantoprazole (PROTONIX) 40 MG EC tablet, Take 1 tablet by mouth Daily., Disp: , Rfl:     spironolactone-hydrochlorothiazide (ALDACTAZIDE) 25-25 MG tablet, Take 0.75 tablets by mouth Daily., Disp: , Rfl:     Trulicity 0.75 MG/0.5ML solution pen-injector, , Disp: , Rfl:     B-D UF III MINI PEN NEEDLES 31G X 5 MM misc, , Disp: , Rfl:     cholecalciferol (VITAMIN D3) 25 MCG (1000 UT) tablet, Daily. (Patient not taking: Reported on 10/10/2023), Disp: , Rfl:     erythromycin (ROMYCIN) 5 MG/GM ophthalmic ointment, erythromycin 5 mg/gram (0.5 %) eye ointment (Patient not taking: Reported on 7/6/2023), Disp: , Rfl:     FREESTYLE LITE test strip, , Disp: , Rfl:      Objective     Physical Exam  Vital Signs:   Obese, WDWN, Alert, NAD.    HEENT:  PERRL, EOMI.  OP, nares clear, no sinus tenderness  Neck:  Supple, no JVD, no thyromegaly.  Lymph: no axillary, cervical, supraclavicular lymphadenopathy noted bilaterally  Chest:  good aeration, clear to auscultation bilaterally, tympanic to percussion bilaterally, no work of breathing noted  CV: RRR, no MGR, pulses 2+, equal.  Abd:  Soft, NT, ND, + BS, no HSM  EXT:  no clubbing, no cyanosis, no edema, no joint tenderness  Neuro:  A&Ox3, CN grossly intact, no focal deficits.  Skin: No rashes or lesions noted.    /68 (BP Location: Left arm, Patient Position: Sitting, Cuff Size: Large Adult)   Pulse 74   Resp 16   Ht 149.9 cm (59.02\")   Wt 94.1 kg (207 lb 8 " oz)   SpO2 98% Comment: room air  BMI 41.89 kg/m²         Result Review :   I have reviewed my last office visit note.    Procedures:           Assessment and Plan      Assessment:  1.  Asthma.  Methacholine challenge test consistent with asthma.  Patient is on LABA/ICS therapy.  2. Chronic dyspnea. PFTs normal.  Methacholine challenge test consistent with asthma.   Alpha-1 with a normal genotype of M/M with level of 117.   3.  Chronic cough, improved per patient report.  4.  Intermittent wheezing, improved per patient report.  5.  Peripheral eosinophilia.  6.  Obstructive sleep apnea on CPAP.  Patient is under the care of the sleep center with Dr. Randall.  7.  Elevated IgE level: 80.1.  8.  Morbid obesity: BMI of 41.8.  9.  Seasonal allergies.  10. GERD.  Patient is on a PPI.  11.  Proximal atrial fibrillation: Patient is under the care of Dr. Ghassan Jimenez.  12.  Never smoker.        Plan:  1.  Continue Advair 230 mcg 2 puffs twice daily.  Patient is advised to rinse her mouth out after each use.  2.  Continue Xopenex inhaler as needed.  Asthma action plan discussed with the patient in the office today.  3.  Continue Singulair and Zyrtec and follow-up with allergist as scheduled.  Follow-up with allergist as scheduled.  4.  Follow-up with cardiologist as scheduled.  5.  For GERD,  patient to continue PPI.   Patient is also advised to sleep with the head of the bed elevated and do not eat 3-4 hours prior to bedtime.  6.  Continue CPAP at night and with naps at current settings and clean mask and tubing daily.  Follow-up with the sleep center as scheduled.  7.  I spent counseled the patient on diet and exercise. Patient's BMI and weight were discussed.  The patient was counseled on initiating and intensifying attempts to lose weight.  Patient was counseled about the risks of obesity and advised to decrease caloric intake by 500 calories a day, eat three small meals a day and advised to minimize snacking.  I  recommended 30-60 minutes of daily exercise.  8.  Vaccination status: patient reports they are up-to-date with flu, pneumonia, and Covid vaccines.  Patient is advised to continue to follow CDC recommendations such as social distancing wearing a mask and washing hands for at least 20 seconds.  9.  Smoking status: Never smoker.  10.  Patient to call the office, 911, or go to the ER with new or worsening symptoms.  11.  Follow-up with cardiologist as scheduled.  12.  Follow-up in March 2024, sooner if needed.          Follow Up   Return for March 2024.  Patient was given instructions and counseling regarding her condition or for health maintenance advice. Please see specific information pulled into the AVS if appropriate.

## 2023-11-11 NOTE — PATIENT INSTRUCTIONS
Asthma, Adult    Asthma is a condition that causes swelling and narrowing of the airways. These are the passages that lead from the nose and mouth down into the lungs. When asthma symptoms get worse it is called an asthma attack or flare. This can make it hard to breathe. Asthma flares can range from minor to life-threatening. There is no cure for asthma, but medicines and lifestyle changes can help to control it.  What are the causes?  It is not known exactly what causes asthma, but certain things can cause asthma symptoms to get worse (triggers).  What can trigger an asthma attack?  Cigarette smoke.  Mold.  Dust.  Your pet's skin flakes (dander).  Cockroaches.  Pollen.  Air pollution (like household , wood smoke, smog, or chemical odors).  What are the signs or symptoms?  Trouble breathing (shortness of breath).  Coughing.  Making high-pitched whistling sounds when you breathe, most often when you breathe out (wheezing).  Chest tightness.  Tiredness with little activity.  Poor exercise tolerance.  How is this treated?  Controller medicines that help prevent asthma symptoms.  Fast-acting reliever or rescue medicines. These give short-term relief of asthma symptoms.  Allergy medicines if your attacks are brought on by allergens.  Medicines to help control the body's defense (immune) system.  Staying away from the things that cause asthma attacks.  Follow these instructions at home:  Avoiding triggers in your home  Do not allow anyone to smoke in your home.  Limit use of fireplaces and wood stoves.  Get rid of pests (such as roaches and mice) and their droppings.  Keep your home clean.  Clean your floors. Dust regularly. Use cleaning products that do not smell.  Wash bed sheets and blankets every week in hot water. Dry them in a dryer.  Have someone vacuum when you are not home.  Change your heating and air conditioning filters often.  Use blankets that are made of polyester or cotton.  General  instructions  Take over-the-counter and prescription medicines only as told by your doctor.  Do not smoke or use any products that contain nicotine or tobacco. If you need help quitting, ask your doctor.  Stay away from secondhand smoke.  Avoid doing things outdoors when allergen counts are high and when air quality is low.  Warm up before you exercise. Take time to cool down after exercise.  Use a peak flow meter as told by your doctor. A peak flow meter is a tool that measures how well your lungs are working.  Keep track of the peak flow meter's readings. Write them down.  Follow your asthma action plan. This is a written plan for taking care of your asthma and treating your attacks.  Make sure you get all the shots (vaccines) that your doctor recommends. Ask your doctor about a flu shot and a pneumonia shot.  Keep all follow-up visits.  Contact a doctor if:  You have wheezing, shortness of breath, or a cough even while taking medicine to prevent attacks.  The mucus you cough up (sputum) is thicker than usual.  The mucus you cough up changes from clear or white to yellow, green, gray, or is bloody.  You have problems from the medicine you are taking, such as:  A rash.  Itching.  Swelling.  Trouble breathing.  You need reliever medicines more than 2-3 times a week.  Your peak flow reading is still at 50-79% of your personal best after following the action plan for 1 hour.  You have a fever.  Get help right away if:  You seem to be worse and are not responding to medicine during an asthma attack.  You are short of breath even at rest.  You get short of breath when doing very little activity.  You have trouble eating, drinking, or talking.  You have chest pain or tightness.  You have a fast heartbeat.  Your lips or fingernails start to turn blue.  You are light-headed or dizzy, or you faint.  Your peak flow is less than 50% of your personal best.  You feel too tired to breathe normally.  These symptoms may be an  emergency. Get help right away. Call 911.  Do not wait to see if the symptoms will go away.  Do not drive yourself to the hospital.  Summary  Asthma is a long-term (chronic) condition in which the airways get tight and narrow. An asthma attack can make it hard to breathe.  Asthma cannot be cured, but medicines and lifestyle changes can help control it.  Make sure you understand how to avoid triggers and how and when to use your medicines.  Avoid things that can cause allergy symptoms (allergens). These include animal skin flakes (dander) and pollen from trees or grass.  Avoid things that pollute the air. These may include household , wood smoke, smog, or chemical odors.  This information is not intended to replace advice given to you by your health care provider. Make sure you discuss any questions you have with your health care provider.  Document Revised: 09/26/2022 Document Reviewed: 09/26/2022  [employer] Patient Education © 2023 [employer]

## 2023-11-21 ENCOUNTER — LAB (OUTPATIENT)
Dept: LAB | Facility: HOSPITAL | Age: 83
End: 2023-11-21
Payer: MEDICARE

## 2023-11-21 ENCOUNTER — TRANSCRIBE ORDERS (OUTPATIENT)
Dept: LAB | Facility: HOSPITAL | Age: 83
End: 2023-11-21
Payer: MEDICARE

## 2023-11-21 DIAGNOSIS — E55.9 VITAMIN D DEFICIENCY: ICD-10-CM

## 2023-11-21 DIAGNOSIS — N18.31 STAGE 3A CHRONIC KIDNEY DISEASE: Primary | ICD-10-CM

## 2023-11-21 DIAGNOSIS — N18.31 STAGE 3A CHRONIC KIDNEY DISEASE: ICD-10-CM

## 2023-11-21 LAB
25(OH)D3 SERPL-MCNC: 27.3 NG/ML (ref 30–100)
ALBUMIN SERPL-MCNC: 4.1 G/DL (ref 3.5–5.2)
ANION GAP SERPL CALCULATED.3IONS-SCNC: 8 MMOL/L (ref 5–15)
BASOPHILS # BLD AUTO: 0.05 10*3/MM3 (ref 0–0.2)
BASOPHILS NFR BLD AUTO: 0.9 % (ref 0–1.5)
BILIRUB UR QL STRIP: NEGATIVE
BUN SERPL-MCNC: 21 MG/DL (ref 8–23)
BUN/CREAT SERPL: 15.2 (ref 7–25)
CALCIUM SPEC-SCNC: 9.3 MG/DL (ref 8.6–10.5)
CHLORIDE SERPL-SCNC: 104 MMOL/L (ref 98–107)
CLARITY UR: CLEAR
CO2 SERPL-SCNC: 26 MMOL/L (ref 22–29)
COLOR UR: YELLOW
CREAT SERPL-MCNC: 1.38 MG/DL (ref 0.57–1)
CREAT UR-MCNC: 56.9 MG/DL
DEPRECATED RDW RBC AUTO: 43.9 FL (ref 37–54)
EGFRCR SERPLBLD CKD-EPI 2021: 37.6 ML/MIN/1.73
EOSINOPHIL # BLD AUTO: 0.24 10*3/MM3 (ref 0–0.4)
EOSINOPHIL NFR BLD AUTO: 4.3 % (ref 0.3–6.2)
ERYTHROCYTE [DISTWIDTH] IN BLOOD BY AUTOMATED COUNT: 13.1 % (ref 12.3–15.4)
GLUCOSE SERPL-MCNC: 161 MG/DL (ref 65–99)
GLUCOSE UR STRIP-MCNC: NEGATIVE MG/DL
HCT VFR BLD AUTO: 41.2 % (ref 34–46.6)
HGB BLD-MCNC: 13.8 G/DL (ref 12–15.9)
HGB UR QL STRIP.AUTO: NEGATIVE
IMM GRANULOCYTES # BLD AUTO: 0.03 10*3/MM3 (ref 0–0.05)
IMM GRANULOCYTES NFR BLD AUTO: 0.5 % (ref 0–0.5)
KETONES UR QL STRIP: NEGATIVE
LEUKOCYTE ESTERASE UR QL STRIP.AUTO: NEGATIVE
LYMPHOCYTES # BLD AUTO: 1.21 10*3/MM3 (ref 0.7–3.1)
LYMPHOCYTES NFR BLD AUTO: 21.9 % (ref 19.6–45.3)
MCH RBC QN AUTO: 30.7 PG (ref 26.6–33)
MCHC RBC AUTO-ENTMCNC: 33.5 G/DL (ref 31.5–35.7)
MCV RBC AUTO: 91.6 FL (ref 79–97)
MONOCYTES # BLD AUTO: 0.44 10*3/MM3 (ref 0.1–0.9)
MONOCYTES NFR BLD AUTO: 8 % (ref 5–12)
NEUTROPHILS NFR BLD AUTO: 3.56 10*3/MM3 (ref 1.7–7)
NEUTROPHILS NFR BLD AUTO: 64.4 % (ref 42.7–76)
NITRITE UR QL STRIP: NEGATIVE
NRBC BLD AUTO-RTO: 0 /100 WBC (ref 0–0.2)
PH UR STRIP.AUTO: 6.5 [PH] (ref 5–8)
PHOSPHATE SERPL-MCNC: 2.7 MG/DL (ref 2.5–4.5)
PLATELET # BLD AUTO: 194 10*3/MM3 (ref 140–450)
PMV BLD AUTO: 10.5 FL (ref 6–12)
POTASSIUM SERPL-SCNC: 4.8 MMOL/L (ref 3.5–5.2)
PROT ?TM UR-MCNC: 12.8 MG/DL
PROT UR QL STRIP: NEGATIVE
PROT/CREAT UR: 0.22 MG/G{CREAT}
PTH-INTACT SERPL-MCNC: 54.4 PG/ML (ref 15–65)
RBC # BLD AUTO: 4.5 10*6/MM3 (ref 3.77–5.28)
SODIUM SERPL-SCNC: 138 MMOL/L (ref 136–145)
SP GR UR STRIP: 1.01 (ref 1–1.03)
UROBILINOGEN UR QL STRIP: NORMAL
WBC NRBC COR # BLD AUTO: 5.53 10*3/MM3 (ref 3.4–10.8)

## 2023-11-21 PROCEDURE — 36415 COLL VENOUS BLD VENIPUNCTURE: CPT

## 2023-11-21 PROCEDURE — 82306 VITAMIN D 25 HYDROXY: CPT

## 2023-11-21 PROCEDURE — 85025 COMPLETE CBC W/AUTO DIFF WBC: CPT

## 2023-11-21 PROCEDURE — 81003 URINALYSIS AUTO W/O SCOPE: CPT

## 2023-11-21 PROCEDURE — 80069 RENAL FUNCTION PANEL: CPT

## 2023-11-21 PROCEDURE — 83970 ASSAY OF PARATHORMONE: CPT

## 2023-11-21 PROCEDURE — 84156 ASSAY OF PROTEIN URINE: CPT

## 2023-11-21 PROCEDURE — 82570 ASSAY OF URINE CREATININE: CPT

## 2023-11-28 ENCOUNTER — OFFICE VISIT (OUTPATIENT)
Dept: PULMONOLOGY | Facility: CLINIC | Age: 83
End: 2023-11-28
Payer: MEDICARE

## 2023-11-28 VITALS
SYSTOLIC BLOOD PRESSURE: 135 MMHG | WEIGHT: 207.5 LBS | BODY MASS INDEX: 41.83 KG/M2 | HEIGHT: 59 IN | HEART RATE: 74 BPM | RESPIRATION RATE: 16 BRPM | DIASTOLIC BLOOD PRESSURE: 68 MMHG | OXYGEN SATURATION: 98 %

## 2023-11-28 DIAGNOSIS — G47.33 OSA ON CPAP: ICD-10-CM

## 2023-11-28 DIAGNOSIS — R05.3 CHRONIC COUGH: ICD-10-CM

## 2023-11-28 DIAGNOSIS — R06.09 CHRONIC DYSPNEA: ICD-10-CM

## 2023-11-28 DIAGNOSIS — J30.2 SEASONAL ALLERGIES: Primary | ICD-10-CM

## 2023-11-28 DIAGNOSIS — R06.2 WHEEZING: ICD-10-CM

## 2023-11-28 DIAGNOSIS — R76.8 ELEVATED IGE LEVEL: ICD-10-CM

## 2023-11-28 DIAGNOSIS — J45.909 ASTHMA, UNSPECIFIED ASTHMA SEVERITY, UNSPECIFIED WHETHER COMPLICATED, UNSPECIFIED WHETHER PERSISTENT: ICD-10-CM

## 2023-11-28 DIAGNOSIS — D72.19 PERIPHERAL EOSINOPHILIA: ICD-10-CM

## 2023-11-28 DIAGNOSIS — I48.0 PAROXYSMAL ATRIAL FIBRILLATION: Chronic | ICD-10-CM

## 2023-11-28 PROCEDURE — 3075F SYST BP GE 130 - 139MM HG: CPT | Performed by: NURSE PRACTITIONER

## 2023-11-28 PROCEDURE — 99214 OFFICE O/P EST MOD 30 MIN: CPT | Performed by: NURSE PRACTITIONER

## 2023-11-28 PROCEDURE — 1159F MED LIST DOCD IN RCRD: CPT | Performed by: NURSE PRACTITIONER

## 2023-11-28 PROCEDURE — 1160F RVW MEDS BY RX/DR IN RCRD: CPT | Performed by: NURSE PRACTITIONER

## 2023-11-28 PROCEDURE — 3078F DIAST BP <80 MM HG: CPT | Performed by: NURSE PRACTITIONER

## 2023-11-28 RX ORDER — FLUTICASONE PROPIONATE AND SALMETEROL XINAFOATE 230; 21 UG/1; UG/1
2 AEROSOL, METERED RESPIRATORY (INHALATION)
Qty: 3 EACH | Refills: 3 | Status: SHIPPED | OUTPATIENT
Start: 2023-11-28 | End: 2024-02-26

## 2023-12-14 ENCOUNTER — HOSPITAL ENCOUNTER (OUTPATIENT)
Dept: ULTRASOUND IMAGING | Facility: HOSPITAL | Age: 83
Discharge: HOME | End: 2023-12-14
Admitting: INTERNAL MEDICINE
Payer: MEDICARE

## 2023-12-14 ENCOUNTER — TRANSCRIBE ORDERS (OUTPATIENT)
Dept: ADMINISTRATIVE | Facility: HOSPITAL | Age: 83
End: 2023-12-14
Payer: MEDICARE

## 2023-12-14 DIAGNOSIS — M12.9 ARTHROPATHY: ICD-10-CM

## 2023-12-14 DIAGNOSIS — M12.9 ARTHROPATHY: Primary | ICD-10-CM

## 2023-12-14 PROCEDURE — 76705 ECHO EXAM OF ABDOMEN: CPT

## 2024-02-19 ENCOUNTER — HOSPITAL ENCOUNTER (INPATIENT)
Facility: HOSPITAL | Age: 84
LOS: 2 days | Discharge: HOME | End: 2024-02-22
Attending: EMERGENCY MEDICINE | Admitting: INTERNAL MEDICINE
Payer: MEDICARE

## 2024-02-19 ENCOUNTER — APPOINTMENT (OUTPATIENT)
Dept: GENERAL RADIOLOGY | Facility: HOSPITAL | Age: 84
End: 2024-02-19
Payer: MEDICARE

## 2024-02-19 DIAGNOSIS — I48.91 ATRIAL FIBRILLATION, UNSPECIFIED TYPE: ICD-10-CM

## 2024-02-19 DIAGNOSIS — R53.1 WEAKNESS: ICD-10-CM

## 2024-02-19 DIAGNOSIS — I47.10 SVT (SUPRAVENTRICULAR TACHYCARDIA): Primary | ICD-10-CM

## 2024-02-19 DIAGNOSIS — R26.2 DIFFICULTY IN WALKING: ICD-10-CM

## 2024-02-19 PROBLEM — I49.9 CARDIAC ARRHYTHMIA: Status: ACTIVE | Noted: 2024-02-19

## 2024-02-19 LAB
ALBUMIN SERPL-MCNC: 3.7 G/DL (ref 3.5–5.2)
ALBUMIN/GLOB SERPL: 1.3 G/DL
ALP SERPL-CCNC: 83 U/L (ref 39–117)
ALT SERPL W P-5'-P-CCNC: 32 U/L (ref 1–33)
ANION GAP SERPL CALCULATED.3IONS-SCNC: 10.1 MMOL/L (ref 5–15)
AST SERPL-CCNC: 25 U/L (ref 1–32)
BASOPHILS # BLD AUTO: 0.05 10*3/MM3 (ref 0–0.2)
BASOPHILS NFR BLD AUTO: 0.5 % (ref 0–1.5)
BILIRUB SERPL-MCNC: 0.3 MG/DL (ref 0–1.2)
BILIRUB UR QL STRIP: NEGATIVE
BUN SERPL-MCNC: 31 MG/DL (ref 8–23)
BUN/CREAT SERPL: 18.8 (ref 7–25)
CALCIUM SPEC-SCNC: 9 MG/DL (ref 8.6–10.5)
CHLORIDE SERPL-SCNC: 101 MMOL/L (ref 98–107)
CLARITY UR: CLEAR
CO2 SERPL-SCNC: 25.9 MMOL/L (ref 22–29)
COLOR UR: YELLOW
CREAT SERPL-MCNC: 1.65 MG/DL (ref 0.57–1)
D-LACTATE SERPL-SCNC: 1.6 MMOL/L (ref 0.5–2)
DEPRECATED RDW RBC AUTO: 44 FL (ref 37–54)
EGFRCR SERPLBLD CKD-EPI 2021: 30.3 ML/MIN/1.73
EOSINOPHIL # BLD AUTO: 0.2 10*3/MM3 (ref 0–0.4)
EOSINOPHIL NFR BLD AUTO: 2.1 % (ref 0.3–6.2)
ERYTHROCYTE [DISTWIDTH] IN BLOOD BY AUTOMATED COUNT: 13 % (ref 12.3–15.4)
FLUAV SUBTYP SPEC NAA+PROBE: NOT DETECTED
FLUBV RNA ISLT QL NAA+PROBE: NOT DETECTED
GLOBULIN UR ELPH-MCNC: 2.9 GM/DL
GLUCOSE BLDC GLUCOMTR-MCNC: 204 MG/DL (ref 70–99)
GLUCOSE SERPL-MCNC: 156 MG/DL (ref 65–99)
GLUCOSE UR STRIP-MCNC: NEGATIVE MG/DL
HCT VFR BLD AUTO: 45.7 % (ref 34–46.6)
HGB BLD-MCNC: 14.8 G/DL (ref 12–15.9)
HGB UR QL STRIP.AUTO: NEGATIVE
HOLD SPECIMEN: NORMAL
HOLD SPECIMEN: NORMAL
IMM GRANULOCYTES # BLD AUTO: 0.03 10*3/MM3 (ref 0–0.05)
IMM GRANULOCYTES NFR BLD AUTO: 0.3 % (ref 0–0.5)
INR PPP: 1.24 (ref 0.86–1.15)
KETONES UR QL STRIP: NEGATIVE
LEUKOCYTE ESTERASE UR QL STRIP.AUTO: NEGATIVE
LYMPHOCYTES # BLD AUTO: 2.26 10*3/MM3 (ref 0.7–3.1)
LYMPHOCYTES NFR BLD AUTO: 23.3 % (ref 19.6–45.3)
MAGNESIUM SERPL-MCNC: 2.2 MG/DL (ref 1.6–2.4)
MCH RBC QN AUTO: 30.1 PG (ref 26.6–33)
MCHC RBC AUTO-ENTMCNC: 32.4 G/DL (ref 31.5–35.7)
MCV RBC AUTO: 92.9 FL (ref 79–97)
MONOCYTES # BLD AUTO: 0.99 10*3/MM3 (ref 0.1–0.9)
MONOCYTES NFR BLD AUTO: 10.2 % (ref 5–12)
NEUTROPHILS NFR BLD AUTO: 6.19 10*3/MM3 (ref 1.7–7)
NEUTROPHILS NFR BLD AUTO: 63.6 % (ref 42.7–76)
NITRITE UR QL STRIP: NEGATIVE
NRBC BLD AUTO-RTO: 0 /100 WBC (ref 0–0.2)
NT-PROBNP SERPL-MCNC: 4981 PG/ML (ref 0–1800)
PH UR STRIP.AUTO: 6 [PH] (ref 5–8)
PLATELET # BLD AUTO: 267 10*3/MM3 (ref 140–450)
PMV BLD AUTO: 10.1 FL (ref 6–12)
POTASSIUM SERPL-SCNC: 4.2 MMOL/L (ref 3.5–5.2)
PROT SERPL-MCNC: 6.6 G/DL (ref 6–8.5)
PROT UR QL STRIP: NEGATIVE
PROTHROMBIN TIME: 15.8 SECONDS (ref 11.8–14.9)
RBC # BLD AUTO: 4.92 10*6/MM3 (ref 3.77–5.28)
RSV RNA NPH QL NAA+NON-PROBE: NOT DETECTED
SARS-COV-2 RNA RESP QL NAA+PROBE: NOT DETECTED
SODIUM SERPL-SCNC: 137 MMOL/L (ref 136–145)
SP GR UR STRIP: 1.01 (ref 1–1.03)
TROPONIN T SERPL HS-MCNC: 17 NG/L
TROPONIN T SERPL HS-MCNC: 17 NG/L
UROBILINOGEN UR QL STRIP: NORMAL
WBC NRBC COR # BLD AUTO: 9.72 10*3/MM3 (ref 3.4–10.8)
WHOLE BLOOD HOLD COAG: NORMAL
WHOLE BLOOD HOLD SPECIMEN: NORMAL

## 2024-02-19 PROCEDURE — G0378 HOSPITAL OBSERVATION PER HR: HCPCS

## 2024-02-19 PROCEDURE — 36415 COLL VENOUS BLD VENIPUNCTURE: CPT

## 2024-02-19 PROCEDURE — 25010000002 FUROSEMIDE PER 20 MG

## 2024-02-19 PROCEDURE — 80053 COMPREHEN METABOLIC PANEL: CPT | Performed by: EMERGENCY MEDICINE

## 2024-02-19 PROCEDURE — 82948 REAGENT STRIP/BLOOD GLUCOSE: CPT

## 2024-02-19 PROCEDURE — 83036 HEMOGLOBIN GLYCOSYLATED A1C: CPT | Performed by: FAMILY MEDICINE

## 2024-02-19 PROCEDURE — 83880 ASSAY OF NATRIURETIC PEPTIDE: CPT

## 2024-02-19 PROCEDURE — 99285 EMERGENCY DEPT VISIT HI MDM: CPT

## 2024-02-19 PROCEDURE — 84484 ASSAY OF TROPONIN QUANT: CPT | Performed by: EMERGENCY MEDICINE

## 2024-02-19 PROCEDURE — 85610 PROTHROMBIN TIME: CPT

## 2024-02-19 PROCEDURE — 84484 ASSAY OF TROPONIN QUANT: CPT | Performed by: FAMILY MEDICINE

## 2024-02-19 PROCEDURE — 71045 X-RAY EXAM CHEST 1 VIEW: CPT

## 2024-02-19 PROCEDURE — 81003 URINALYSIS AUTO W/O SCOPE: CPT

## 2024-02-19 PROCEDURE — 93005 ELECTROCARDIOGRAM TRACING: CPT | Performed by: EMERGENCY MEDICINE

## 2024-02-19 PROCEDURE — 63710000001 INSULIN DETEMIR PER 5 UNITS: Performed by: FAMILY MEDICINE

## 2024-02-19 PROCEDURE — 87637 SARSCOV2&INF A&B&RSV AMP PRB: CPT

## 2024-02-19 PROCEDURE — 93010 ELECTROCARDIOGRAM REPORT: CPT | Performed by: SPECIALIST

## 2024-02-19 PROCEDURE — 83735 ASSAY OF MAGNESIUM: CPT | Performed by: EMERGENCY MEDICINE

## 2024-02-19 PROCEDURE — 87040 BLOOD CULTURE FOR BACTERIA: CPT

## 2024-02-19 PROCEDURE — 85025 COMPLETE CBC W/AUTO DIFF WBC: CPT | Performed by: EMERGENCY MEDICINE

## 2024-02-19 PROCEDURE — 83605 ASSAY OF LACTIC ACID: CPT

## 2024-02-19 RX ORDER — MONTELUKAST SODIUM 10 MG/1
10 TABLET ORAL NIGHTLY
Status: DISCONTINUED | OUTPATIENT
Start: 2024-02-19 | End: 2024-02-22 | Stop reason: HOSPADM

## 2024-02-19 RX ORDER — DILTIAZEM HYDROCHLORIDE 5 MG/ML
20 INJECTION INTRAVENOUS ONCE
Status: COMPLETED | OUTPATIENT
Start: 2024-02-19 | End: 2024-02-19

## 2024-02-19 RX ORDER — SODIUM CHLORIDE, SODIUM LACTATE, POTASSIUM CHLORIDE, CALCIUM CHLORIDE 600; 310; 30; 20 MG/100ML; MG/100ML; MG/100ML; MG/100ML
100 INJECTION, SOLUTION INTRAVENOUS CONTINUOUS
Status: DISCONTINUED | OUTPATIENT
Start: 2024-02-19 | End: 2024-02-19

## 2024-02-19 RX ORDER — POLYETHYLENE GLYCOL 3350 17 G/17G
17 POWDER, FOR SOLUTION ORAL DAILY PRN
Status: DISCONTINUED | OUTPATIENT
Start: 2024-02-19 | End: 2024-02-22 | Stop reason: HOSPADM

## 2024-02-19 RX ORDER — BISACODYL 5 MG/1
5 TABLET, DELAYED RELEASE ORAL DAILY PRN
Status: DISCONTINUED | OUTPATIENT
Start: 2024-02-19 | End: 2024-02-22 | Stop reason: HOSPADM

## 2024-02-19 RX ORDER — SODIUM CHLORIDE 0.9 % (FLUSH) 0.9 %
10 SYRINGE (ML) INJECTION AS NEEDED
Status: DISCONTINUED | OUTPATIENT
Start: 2024-02-19 | End: 2024-02-22 | Stop reason: HOSPADM

## 2024-02-19 RX ORDER — SODIUM CHLORIDE 0.9 % (FLUSH) 0.9 %
10 SYRINGE (ML) INJECTION EVERY 12 HOURS SCHEDULED
Status: DISCONTINUED | OUTPATIENT
Start: 2024-02-19 | End: 2024-02-22 | Stop reason: HOSPADM

## 2024-02-19 RX ORDER — SODIUM CHLORIDE 9 MG/ML
40 INJECTION, SOLUTION INTRAVENOUS AS NEEDED
Status: DISCONTINUED | OUTPATIENT
Start: 2024-02-19 | End: 2024-02-22 | Stop reason: HOSPADM

## 2024-02-19 RX ORDER — HYDRALAZINE HYDROCHLORIDE 50 MG/1
50 TABLET, FILM COATED ORAL 3 TIMES DAILY
Status: DISCONTINUED | OUTPATIENT
Start: 2024-02-19 | End: 2024-02-22 | Stop reason: HOSPADM

## 2024-02-19 RX ORDER — AMOXICILLIN 250 MG
2 CAPSULE ORAL 2 TIMES DAILY PRN
Status: DISCONTINUED | OUTPATIENT
Start: 2024-02-19 | End: 2024-02-22 | Stop reason: HOSPADM

## 2024-02-19 RX ORDER — METOPROLOL SUCCINATE 50 MG/1
50 TABLET, EXTENDED RELEASE ORAL DAILY
Status: DISCONTINUED | OUTPATIENT
Start: 2024-02-20 | End: 2024-02-21

## 2024-02-19 RX ORDER — NICOTINE POLACRILEX 4 MG
15 LOZENGE BUCCAL
Status: DISCONTINUED | OUTPATIENT
Start: 2024-02-19 | End: 2024-02-22 | Stop reason: HOSPADM

## 2024-02-19 RX ORDER — INSULIN ASPART 100 [IU]/ML
20 INJECTION, SOLUTION INTRAVENOUS; SUBCUTANEOUS
COMMUNITY
Start: 2023-11-07

## 2024-02-19 RX ORDER — BISACODYL 10 MG
10 SUPPOSITORY, RECTAL RECTAL DAILY PRN
Status: DISCONTINUED | OUTPATIENT
Start: 2024-02-19 | End: 2024-02-22 | Stop reason: HOSPADM

## 2024-02-19 RX ORDER — FUROSEMIDE 10 MG/ML
40 INJECTION INTRAMUSCULAR; INTRAVENOUS ONCE
Status: COMPLETED | OUTPATIENT
Start: 2024-02-19 | End: 2024-02-19

## 2024-02-19 RX ORDER — DULAGLUTIDE 3 MG/.5ML
3 INJECTION, SOLUTION SUBCUTANEOUS WEEKLY
COMMUNITY
Start: 2024-02-07

## 2024-02-19 RX ORDER — ONDANSETRON 2 MG/ML
4 INJECTION INTRAMUSCULAR; INTRAVENOUS EVERY 6 HOURS PRN
Status: DISCONTINUED | OUTPATIENT
Start: 2024-02-19 | End: 2024-02-22 | Stop reason: HOSPADM

## 2024-02-19 RX ORDER — GABAPENTIN 300 MG/1
600 CAPSULE ORAL EVERY 8 HOURS SCHEDULED
Status: DISCONTINUED | OUTPATIENT
Start: 2024-02-19 | End: 2024-02-22 | Stop reason: HOSPADM

## 2024-02-19 RX ORDER — HYDRALAZINE HYDROCHLORIDE 50 MG/1
50 TABLET, FILM COATED ORAL 3 TIMES DAILY
COMMUNITY
Start: 2024-01-17 | End: 2024-02-22 | Stop reason: HOSPADM

## 2024-02-19 RX ORDER — IBUPROFEN 600 MG/1
1 TABLET ORAL
Status: DISCONTINUED | OUTPATIENT
Start: 2024-02-19 | End: 2024-02-22 | Stop reason: HOSPADM

## 2024-02-19 RX ORDER — INSULIN LISPRO 100 [IU]/ML
2-9 INJECTION, SOLUTION INTRAVENOUS; SUBCUTANEOUS
Status: DISCONTINUED | OUTPATIENT
Start: 2024-02-20 | End: 2024-02-22 | Stop reason: HOSPADM

## 2024-02-19 RX ORDER — DEXTROSE MONOHYDRATE 25 G/50ML
25 INJECTION, SOLUTION INTRAVENOUS
Status: DISCONTINUED | OUTPATIENT
Start: 2024-02-19 | End: 2024-02-22 | Stop reason: HOSPADM

## 2024-02-19 RX ADMIN — DILTIAZEM HYDROCHLORIDE 20 MG: 5 INJECTION, SOLUTION INTRAVENOUS at 17:19

## 2024-02-19 RX ADMIN — FUROSEMIDE 40 MG: 10 INJECTION, SOLUTION INTRAMUSCULAR; INTRAVENOUS at 18:40

## 2024-02-19 RX ADMIN — MONTELUKAST 10 MG: 10 TABLET, FILM COATED ORAL at 23:25

## 2024-02-19 RX ADMIN — GABAPENTIN 600 MG: 300 CAPSULE ORAL at 23:25

## 2024-02-19 RX ADMIN — METOPROLOL TARTRATE 2.5 MG: 1 INJECTION, SOLUTION INTRAVENOUS at 23:24

## 2024-02-19 RX ADMIN — INSULIN DETEMIR 30 UNITS: 100 INJECTION, SOLUTION SUBCUTANEOUS at 23:24

## 2024-02-19 RX ADMIN — Medication 10 ML: at 23:25

## 2024-02-19 RX ADMIN — APIXABAN 5 MG: 5 TABLET, FILM COATED ORAL at 23:25

## 2024-02-19 NOTE — ED PROVIDER NOTES
Time: 6:27 PM EST  Date of encounter:  2024  Independent Historian/Clinical History and Information was obtained by:   Patient    History is limited by: N/A    Chief Complaint: Weakness      History of Present Illness:  Patient is a 83 y.o. year old female who presents to the emergency department for evaluation of weakness that began 1 week ago.  Patient states she has not had much energy.  Patient denies chest pain or shortness of air.  Patient states she had a pacemaker placed 7 years ago at [facility] in [place].  Patient states she has a new cardiologist in [place] named Dr.El Yang.     HPI    Patient Care Team  Primary Care Provider: Cindy Sal MD    Past Medical History:     Allergies   Allergen Reactions    Aricept [Donepezil] Unknown - High Severity    Diphenhydramine Unknown - Low Severity    Lisinopril Swelling    Nsaids Unknown - High Severity and Unknown - Low Severity    Pseudoephedrine Swelling     Past Medical History:   Diagnosis Date    Asthma     Atrial fibrillation     Back pain     Breast cancer     Cancer     breast    Cirrhosis of liver not due to alcohol     Diabetes mellitus     GERD (gastroesophageal reflux disease)     Hypertension     Kidney stone     Neuropathy     Sleep apnea     CPAP     Past Surgical History:   Procedure Laterality Date     SECTION      CHOLECYSTECTOMY      COLONOSCOPY N/A 2022    Procedure: COLONOSCOPY WITH HOT SNARE PIECE MEAL POLYPECTOMY, ORISE INJECTION;  Surgeon: Pierre Delong MD;  Location: [facility] ENDOSCOPY;  Service: Gastroenterology;  Laterality: N/A;  COLON POLYPS    COLONOSCOPY N/A 2022    Procedure: COLONOSCOPY WITH POLYPECTOMY, CAUTERY;  Surgeon: Pierre Delong MD;  Location: [facility] ENDOSCOPY;  Service: Gastroenterology;  Laterality: N/A;  COLON POLYP    COLONOSCOPY N/A 2023    Procedure: COLONOSCOPY WITH HOT SNARE POLYPECTOMY;  Surgeon: Pierre Delong MD;  Location: [facility] ENDOSCOPY;   Service: Gastroenterology;  Laterality: N/A;  HEMORRHOID, COLON POLYP    ENDOSCOPY N/A 02/08/2022    Procedure: ESOPHAGOGASTRODUODENOSCOPY WITH HOT SNARE POLYPECTOMY, BIOPSIES, ORISE INJECTION AND CLIP APPLICATION X1;  Surgeon: Pierre Delong MD;  Location: [facility] ENDOSCOPY;  Service: Gastroenterology;  Laterality: N/A;  GASTRIC POLYP AND HIATAL HERNIA    EYE SURGERY Bilateral     cataracts    HERNIA REPAIR      inguinal    HYSTERECTOMY      KIDNEY STONE SURGERY      MASTECTOMY Bilateral     PACEMAKER IMPLANTATION       Family History   Problem Relation Age of Onset    Colon polyps Father     Colon polyps Other     Malig Hyperthermia Neg Hx        Home Medications:  Prior to Admission medications    Medication Sig Start Date End Date Taking? Authorizing Provider   apixaban (ELIQUIS) 5 MG tablet tablet Take 1 tablet by mouth 2 (Two) Times a Day.    Emergency, Nurse Gely, RN   B-D UF III MINI PEN NEEDLES 31G X 5 MM misc  11/29/22   Sherie Blandon MD   cetirizine (zyrTEC) 10 MG tablet Take 1 tablet by mouth Daily for 90 days. 8/10/23 11/28/23  Daysi Pizarro APRN   cholecalciferol (VITAMIN D3) 25 MCG (1000 UT) tablet Daily.  Patient not taking: Reported on 10/10/2023 8/24/22   Sherie Blandon MD   erythromycin (ROMYCIN) 5 MG/GM ophthalmic ointment erythromycin 5 mg/gram (0.5 %) eye ointment  Patient not taking: Reported on 7/6/2023    Sherie Blandon MD   fluticasone-salmeterol (Advair HFA) 230-21 MCG/ACT inhaler Inhale 2 puffs 2 (Two) Times a Day for 90 days. Rinse mouth out after each use 11/28/23 2/26/24  Daysi Pizarro APRN   FREESTYLE LITE test strip  8/12/22   Sherie Blandon MD   gabapentin (NEURONTIN) 600 MG tablet Take 1 tablet by mouth Daily.    Sherie Blandon MD   insulin glargine (LANTUS, SEMGLEE) 100 UNIT/ML injection Inject 68 Units under the skin into the appropriate area as directed Every Night.    Sherie Blandon MD   levalbuterol (XOPENEX HFA) 45  "MCG/ACT inhaler Inhale 2 puffs every 4 to 6 hours as needed for coughing, wheezing, shortness of air. Use with spacer 9/28/23   Sherie Blandon MD   metoprolol succinate XL (TOPROL-XL) 50 MG 24 hr tablet Take 1 tablet by mouth Daily.    Sherie Blandon MD   montelukast (SINGULAIR) 10 MG tablet  9/25/23   Sherie Blandon MD   olmesartan (BENICAR) 40 MG tablet Take 1 tablet by mouth Daily.    Emergency, Nurse Epic, RN   pantoprazole (PROTONIX) 40 MG EC tablet Take 1 tablet by mouth Daily.    Sherie Blandon MD   spironolactone-hydrochlorothiazide (ALDACTAZIDE) 25-25 MG tablet Take 0.75 tablets by mouth Daily. 10/11/21   Sherie Blandon MD   Trulicity 0.75 MG/0.5ML solution pen-injector  5/19/23   Sherie Blandon MD        Social History:   Social History     Tobacco Use    Smoking status: Never     Passive exposure: Past    Smokeless tobacco: Never   Vaping Use    Vaping Use: Never used   Substance Use Topics    Alcohol use: Never    Drug use: Never         Review of Systems:  Review of Systems   Constitutional:  Negative for chills and fever.   HENT:  Negative for congestion, rhinorrhea and sore throat.    Eyes:  Negative for pain and visual disturbance.   Respiratory:  Negative for apnea, cough, chest tightness and shortness of breath.    Cardiovascular:  Negative for chest pain and palpitations.   Gastrointestinal:  Negative for abdominal pain, diarrhea, nausea and vomiting.   Genitourinary:  Negative for difficulty urinating and dysuria.   Musculoskeletal:  Negative for joint swelling and myalgias.   Skin:  Negative for color change.   Neurological:  Positive for weakness. Negative for seizures and headaches.   Psychiatric/Behavioral: Negative.     All other systems reviewed and are negative.       Physical Exam:  /83 (BP Location: Left arm, Patient Position: Sitting)   Pulse (!) 137   Temp 97.9 °F (36.6 °C) (Oral)   Resp 13   Ht 152.4 cm (60\")   Wt 94.2 kg (207 lb " 10.8 oz)   SpO2 98%   BMI 40.56 kg/m²     Physical Exam  Vitals and nursing note reviewed.   Constitutional:       General: She is not in acute distress.     Appearance: Normal appearance. She is not toxic-appearing.   HENT:      Head: Normocephalic and atraumatic.      Jaw: There is normal jaw occlusion.   Eyes:      General: Lids are normal.      Extraocular Movements: Extraocular movements intact.      Conjunctiva/sclera: Conjunctivae normal.      Pupils: Pupils are equal, round, and reactive to light.   Cardiovascular:      Rate and Rhythm: Normal rate and regular rhythm.      Pulses: Normal pulses.      Heart sounds: Normal heart sounds.   Pulmonary:      Effort: Pulmonary effort is normal. No respiratory distress.      Breath sounds: Normal breath sounds. No wheezing or rhonchi.   Abdominal:      General: Abdomen is flat.      Palpations: Abdomen is soft.      Tenderness: There is no abdominal tenderness. There is no guarding or rebound.   Musculoskeletal:         General: Normal range of motion.      Cervical back: Normal range of motion and neck supple.      Right lower leg: No edema.      Left lower leg: No edema.   Skin:     General: Skin is warm and dry.   Neurological:      Mental Status: She is alert and oriented to person, place, and time. Mental status is at baseline.   Psychiatric:         Mood and Affect: Mood normal.                  Procedures:  Procedures      Medical Decision Making:      Comorbidities that affect care:    Hypertension, diabetes, A-fib, asthma    External Notes reviewed:          The following orders were placed and all results were independently analyzed by me:  Orders Placed This Encounter   Procedures    COVID PRE-OP / PRE-PROCEDURE SCREENING ORDER (NO ISOLATION) - Swab, Nasopharynx    COVID-19, FLU A/B, RSV PCR 1 HR TAT - Swab, Nasopharynx    Blood Culture - Blood,    XR Chest 1 View    [place] Draw    Comprehensive Metabolic Panel    Magnesium    Single High Sensitivity  Troponin T    CBC Auto Differential    Urinalysis With Culture If Indicated - Urine, Clean Catch    Protime-INR    BNP    Lactic Acid, Plasma    NPO Diet NPO Type: Strict NPO    Undress & Gown    Continuous Pulse Oximetry    Vital Signs    Orthostatic Blood Pressure    Inpatient Hospitalist Consult    Oxygen Therapy- Nasal Cannula; Titrate 1-6 LPM Per SpO2; 90 - 95%    POC Glucose Once    ECG 12 Lead ED Triage Standing Order; Syncope    Insert Peripheral IV    Initiate Observation Status    CBC & Differential    Green Top (Gel)    Lavender Top    Gold Top - SST    Light Blue Top       Medications Given in the Emergency Department:  Medications   sodium chloride 0.9 % flush 10 mL (has no administration in time range)   furosemide (LASIX) injection 40 mg (has no administration in time range)   dilTIAZem (CARDIZEM) injection 20 mg (20 mg Intravenous Given 2/19/24 1719)        ED Course:    ED Course as of 02/19/24 1834 Mon Feb 19, 2024   1831 ECG 12 Lead ED Triage Standing Order; Syncope [SK]      ED Course User Index  [SK] Anthony Aburto PA-C       Labs:    Lab Results (last 24 hours)       Procedure Component Value Units Date/Time    CBC & Differential [[id number]]  (Abnormal) Collected: 02/19/24 1631    Specimen: Blood Updated: 02/19/24 1634    Narrative:      The following orders were created for panel order CBC & Differential.  Procedure                               Abnormality         Status                     ---------                               -----------         ------                     CBC Auto Differential[[id number]]        Abnormal            Final result                 Please view results for these tests on the individual orders.    CBC Auto Differential [[id number]]  (Abnormal) Collected: 02/19/24 1631    Specimen: Blood Updated: 02/19/24 1634     WBC 9.72 10*3/mm3      RBC 4.92 10*6/mm3      Hemoglobin 14.8 g/dL      Hematocrit 45.7 %      MCV 92.9 fL      MCH 30.1 pg      MCHC 32.4 g/dL       RDW 13.0 %      RDW-SD 44.0 fl      MPV 10.1 fL      Platelets 267 10*3/mm3      Neutrophil % 63.6 %      Lymphocyte % 23.3 %      Monocyte % 10.2 %      Eosinophil % 2.1 %      Basophil % 0.5 %      Immature Grans % 0.3 %      Neutrophils, Absolute 6.19 10*3/mm3      Lymphocytes, Absolute 2.26 10*3/mm3      Monocytes, Absolute 0.99 10*3/mm3      Eosinophils, Absolute 0.20 10*3/mm3      Basophils, Absolute 0.05 10*3/mm3      Immature Grans, Absolute 0.03 10*3/mm3      nRBC 0.0 /100 WBC     Protime-INR [[id number]]  (Abnormal) Collected: 02/19/24 1631    Specimen: Blood Updated: 02/19/24 1650     Protime 15.8 Seconds      INR 1.24    Narrative:      Suggested Therapeutic Ranges For Oral Anticoagulant Therapy:  Level of Therapy                      INR Target Range  Standard Dose                            2.0-3.0  High Dose                                2.5-3.5  Patients not receiving anticoagulant  Therapy Normal Range                     0.86-1.15    COVID PRE-OP / PRE-PROCEDURE SCREENING ORDER (NO ISOLATION) - Swab, Nasopharynx [[id number]]  (Normal) Collected: 02/19/24 1641    Specimen: Swab from Nasopharynx Updated: 02/19/24 1740    Narrative:      The following orders were created for panel order COVID PRE-OP / PRE-PROCEDURE SCREENING ORDER (NO ISOLATION) - Swab, Nasopharynx.  Procedure                               Abnormality         Status                     ---------                               -----------         ------                     COVID-19, FLU A/B, RSV P...[[id number]]  Normal              Final result                 Please view results for these tests on the individual orders.    COVID-19, FLU A/B, RSV PCR 1 HR TAT - Swab, Nasopharynx [[id number]]  (Normal) Collected: 02/19/24 1641    Specimen: Swab from Nasopharynx Updated: 02/19/24 1740     COVID19 Not Detected     Influenza A PCR Not Detected     Influenza B PCR Not Detected     RSV, PCR Not Detected    Narrative:      Fact sheet for  providers: https://www.fda.gov/media/390424/download    Fact sheet for patients: https://www.fda.gov/media/739920/download    Test performed by PCR.    Comprehensive Metabolic Panel [[id number]]  (Abnormal) Collected: 02/19/24 1718    Specimen: Blood Updated: 02/19/24 1759     Glucose 156 mg/dL      BUN 31 mg/dL      Creatinine 1.65 mg/dL      Sodium 137 mmol/L      Potassium 4.2 mmol/L      Chloride 101 mmol/L      CO2 25.9 mmol/L      Calcium 9.0 mg/dL      Total Protein 6.6 g/dL      Albumin 3.7 g/dL      ALT (SGPT) 32 U/L      AST (SGOT) 25 U/L      Alkaline Phosphatase 83 U/L      Total Bilirubin 0.3 mg/dL      Globulin 2.9 gm/dL      A/G Ratio 1.3 g/dL      BUN/Creatinine Ratio 18.8     Anion Gap 10.1 mmol/L      eGFR 30.3 mL/min/1.73     Narrative:      GFR Normal >60  Chronic Kidney Disease <60  Kidney Failure <15    The GFR formula is only valid for adults with stable renal function between ages 18 and 70.    Magnesium [[id number]]  (Normal) Collected: 02/19/24 1718    Specimen: Blood Updated: 02/19/24 1759     Magnesium 2.2 mg/dL     Single High Sensitivity Troponin T [[id number]]  (Abnormal) Collected: 02/19/24 1718    Specimen: Blood Updated: 02/19/24 1759     HS Troponin T 17 ng/L     Narrative:      High Sensitive Troponin T Reference Range:  <14.0 ng/L- Negative Female for AMI  <22.0 ng/L- Negative Male for AMI  >=14 - Abnormal Female indicating possible myocardial injury.  >=22 - Abnormal Male indicating possible myocardial injury.   Clinicians would have to utilize clinical acumen, EKG, Troponin, and serial changes to determine if it is an Acute Myocardial Infarction or myocardial injury due to an underlying chronic condition.         BNP [[id number]]  (Abnormal) Collected: 02/19/24 1718    Specimen: Blood Updated: 02/19/24 1755     proBNP 4,981.0 pg/mL     Narrative:      This assay is used as an aid in the diagnosis of individuals suspected of having heart failure. It can be used as an aid in the  diagnosis of acute decompensated heart failure (ADHF) in patients presenting with signs and symptoms of ADHF to the emergency department (ED). In addition, NT-proBNP of <300 pg/mL indicates ADHF is not likely.    Age Range Result Interpretation  NT-proBNP Concentration (pg/mL:      <50             Positive            >450                   Gray                 300-450                    Negative             <300    50-75           Positive            >900                  Gray                300-900                  Negative            <300      >75             Positive            >1800                  Gray                300-1800                  Negative            <300    Lactic Acid, Plasma [[id number]]  (Normal) Collected: 02/19/24 1718    Specimen: Blood Updated: 02/19/24 1755     Lactate 1.6 mmol/L     Blood Culture - Blood, Arm, Left [[id number]] Collected: 02/19/24 1718    Specimen: Blood from Arm, Left Updated: 02/19/24 1732    Urinalysis With Culture If Indicated - Urine, Clean Catch [[id number]]  (Normal) Collected: 02/19/24 1818    Specimen: Urine, Clean Catch Updated: 02/19/24 1833     Color, UA Yellow     Appearance, UA Clear     pH, UA 6.0     Specific Gravity, UA 1.008     Glucose, UA Negative     Ketones, UA Negative     Bilirubin, UA Negative     Blood, UA Negative     Protein, UA Negative     Leuk Esterase, UA Negative     Nitrite, UA Negative     Urobilinogen, UA 0.2 E.U./dL    Narrative:      In absence of clinical symptoms, the presence of pyuria, bacteria, and/or nitrites on the urinalysis result does not correlate with infection.  Urine microscopic not indicated.             Imaging:    XR Chest 1 View    Result Date: 2/19/2024  PROCEDURE: XR CHEST 1 VW  COMPARISON: [facility], CT, CT CHEST W CONTRAST DIAGNOSTIC, 5/10/2023, 15:13.  INDICATIONS: WEAKNESS TODAY  FINDINGS:  The heart looks enlarged.  A cardiac pacemaker device is present.  There are no infiltrates or obvious pleural  effusions.  Patient has had a right mastectomy.        1. Cardiomegaly.       CRIS ONEIL MD       Electronically Signed and Approved By: CRIS ONEIL MD on 2/19/2024 at 16:36                Differential Diagnosis and Discussion:    Chest Pain:  Based on the patient's signs and symptoms, I considered aortic dissection, myocardial infaction, pulmonary embolism, cardiac tamponade, pericarditis, pneumothorax, musculoskeletal chest pain and other differential diagnosis as an etiology of the patient's chest pain.   Weakness: Based on the patient's history, signs, and symptoms, the diffential diagnosis includes but is not limited to meningitis, stroke, sepsis, subarachnoid hemorrhage, intracranial bleeding, encephalitis, acute uti, dehydration, MS, myasthenia gravis, Guillan [place], migraine variant, neuromuscular disorders vertigo, electrolyte imbalance, and metabolic disorders.    All labs were reviewed and interpreted by me.  All X-rays impressions were independently interpreted by me.  EKG was interpreted by supervising attending.    MDM     Amount and/or Complexity of Data Reviewed  Clinical lab tests: reviewed  Tests in the radiology section of CPT®: reviewed  Tests in the medicine section of CPT®: reviewed       Patient's heart rate was 137 upon arrival.  Dr. Greer Instructed me to give the patient 20 mg of Cardizem.  Since giving the Cardizem the patient's heart rate has been between 65 and 75 the patient states she has been feeling much better.  There is an elevated BNP today at 4900.  There is no lower extremity edema or obvious JVD present on exam.  I spoke with hospitalist Dr. He who agrees to admit the patient for further observation/evaluation.          Patient Care Considerations:          Consultants/Shared Management Plan:    I spoke with hospitalist Dr. He who agrees to admit the patient for further observation/evaluation.    Social Determinants of Health:          Disposition and Care  Coordination:    Admit:   Through independent evaluation of the patient's history, physical, and imperical data, the patient meets criteria for inpatient admission to the hospital.        Final diagnoses:   SVT (supraventricular tachycardia)   Atrial fibrillation, unspecified type   Weakness        ED Disposition       ED Disposition   Decision to Admit    Condition   --    Comment   Level of Care: Telemetry [5]   Diagnosis: Cardiac arrhythmia [[id number]]   Admitting Physician: MACARIO HUERTA [[id number]]                 This medical record created using voice recognition software.             Anthony Aburto PA-C  02/19/24 2971

## 2024-02-20 ENCOUNTER — APPOINTMENT (OUTPATIENT)
Dept: CARDIOLOGY | Facility: HOSPITAL | Age: 84
End: 2024-02-20
Payer: MEDICARE

## 2024-02-20 PROBLEM — R06.00 DYSPNEA: Status: ACTIVE | Noted: 2024-02-20

## 2024-02-20 LAB
ANION GAP SERPL CALCULATED.3IONS-SCNC: 10.5 MMOL/L (ref 5–15)
BUN SERPL-MCNC: 33 MG/DL (ref 8–23)
BUN/CREAT SERPL: 20.4 (ref 7–25)
CALCIUM SPEC-SCNC: 9.1 MG/DL (ref 8.6–10.5)
CHLORIDE SERPL-SCNC: 101 MMOL/L (ref 98–107)
CO2 SERPL-SCNC: 25.5 MMOL/L (ref 22–29)
CREAT SERPL-MCNC: 1.62 MG/DL (ref 0.57–1)
EGFRCR SERPLBLD CKD-EPI 2021: 31 ML/MIN/1.73
GEN 5 2HR TROPONIN T REFLEX: 16 NG/L
GLUCOSE BLDC GLUCOMTR-MCNC: 125 MG/DL (ref 70–99)
GLUCOSE BLDC GLUCOMTR-MCNC: 171 MG/DL (ref 70–99)
GLUCOSE BLDC GLUCOMTR-MCNC: 196 MG/DL (ref 70–99)
GLUCOSE BLDC GLUCOMTR-MCNC: 204 MG/DL (ref 70–99)
GLUCOSE SERPL-MCNC: 190 MG/DL (ref 65–99)
HBA1C MFR BLD: 7.5 % (ref 4.8–5.6)
POTASSIUM SERPL-SCNC: 3.8 MMOL/L (ref 3.5–5.2)
SODIUM SERPL-SCNC: 137 MMOL/L (ref 136–145)
TROPONIN T DELTA: -1 NG/L
TSH SERPL DL<=0.05 MIU/L-ACNC: 0.52 UIU/ML (ref 0.27–4.2)
WHOLE BLOOD HOLD SPECIMEN: NORMAL

## 2024-02-20 PROCEDURE — 93306 TTE W/DOPPLER COMPLETE: CPT

## 2024-02-20 PROCEDURE — 93005 ELECTROCARDIOGRAM TRACING: CPT | Performed by: FAMILY MEDICINE

## 2024-02-20 PROCEDURE — 94799 UNLISTED PULMONARY SVC/PX: CPT

## 2024-02-20 PROCEDURE — 99222 1ST HOSP IP/OBS MODERATE 55: CPT | Performed by: FAMILY MEDICINE

## 2024-02-20 PROCEDURE — 97161 PT EVAL LOW COMPLEX 20 MIN: CPT

## 2024-02-20 PROCEDURE — 94660 CPAP INITIATION&MGMT: CPT

## 2024-02-20 PROCEDURE — 84484 ASSAY OF TROPONIN QUANT: CPT | Performed by: FAMILY MEDICINE

## 2024-02-20 PROCEDURE — 82948 REAGENT STRIP/BLOOD GLUCOSE: CPT | Performed by: FAMILY MEDICINE

## 2024-02-20 PROCEDURE — 94761 N-INVAS EAR/PLS OXIMETRY MLT: CPT

## 2024-02-20 PROCEDURE — 63710000001 INSULIN LISPRO (HUMAN) PER 5 UNITS: Performed by: FAMILY MEDICINE

## 2024-02-20 PROCEDURE — 84443 ASSAY THYROID STIM HORMONE: CPT | Performed by: FAMILY MEDICINE

## 2024-02-20 PROCEDURE — 25010000002 SULFUR HEXAFLUORIDE MICROSPH 60.7-25 MG RECONSTITUTED SUSPENSION: Performed by: INTERNAL MEDICINE

## 2024-02-20 PROCEDURE — 82948 REAGENT STRIP/BLOOD GLUCOSE: CPT

## 2024-02-20 PROCEDURE — 93010 ELECTROCARDIOGRAM REPORT: CPT | Performed by: INTERNAL MEDICINE

## 2024-02-20 PROCEDURE — 80048 BASIC METABOLIC PNL TOTAL CA: CPT | Performed by: FAMILY MEDICINE

## 2024-02-20 PROCEDURE — 63710000001 INSULIN DETEMIR PER 5 UNITS: Performed by: FAMILY MEDICINE

## 2024-02-20 RX ORDER — METOPROLOL SUCCINATE 25 MG/1
25 TABLET, EXTENDED RELEASE ORAL ONCE
Status: COMPLETED | OUTPATIENT
Start: 2024-02-20 | End: 2024-02-20

## 2024-02-20 RX ADMIN — Medication 10 ML: at 21:16

## 2024-02-20 RX ADMIN — APIXABAN 5 MG: 5 TABLET, FILM COATED ORAL at 21:16

## 2024-02-20 RX ADMIN — Medication 10 ML: at 09:21

## 2024-02-20 RX ADMIN — METOPROLOL SUCCINATE 25 MG: 25 TABLET, EXTENDED RELEASE ORAL at 12:41

## 2024-02-20 RX ADMIN — APIXABAN 5 MG: 5 TABLET, FILM COATED ORAL at 09:21

## 2024-02-20 RX ADMIN — INSULIN LISPRO 2 UNITS: 100 INJECTION, SOLUTION INTRAVENOUS; SUBCUTANEOUS at 21:16

## 2024-02-20 RX ADMIN — INSULIN LISPRO 4 UNITS: 100 INJECTION, SOLUTION INTRAVENOUS; SUBCUTANEOUS at 17:22

## 2024-02-20 RX ADMIN — SULFUR HEXAFLUORIDE 5 ML: KIT at 08:40

## 2024-02-20 RX ADMIN — INSULIN LISPRO 2 UNITS: 100 INJECTION, SOLUTION INTRAVENOUS; SUBCUTANEOUS at 12:41

## 2024-02-20 RX ADMIN — MONTELUKAST 10 MG: 10 TABLET, FILM COATED ORAL at 21:16

## 2024-02-20 RX ADMIN — INSULIN DETEMIR 30 UNITS: 100 INJECTION, SOLUTION SUBCUTANEOUS at 21:16

## 2024-02-20 RX ADMIN — GABAPENTIN 600 MG: 300 CAPSULE ORAL at 07:15

## 2024-02-20 RX ADMIN — METOPROLOL SUCCINATE 50 MG: 50 TABLET, EXTENDED RELEASE ORAL at 09:21

## 2024-02-20 NOTE — THERAPY EVALUATION
Acute Care - Physical Therapy Initial Evaluation   [place]     Patient Name: Bella Ritter  : 1940  MRN: [id number]  Today's Date: 2024      Admit date: 2024     Referring Physician: Mustapha Duff MD     Surgery Date:* No surgery found *            Visit Dx:     ICD-10-CM ICD-9-CM   1. SVT (supraventricular tachycardia)  I47.10 427.89   2. Atrial fibrillation, unspecified type  I48.91 427.31   3. Weakness  R53.1 780.79   4. 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    Pulmonary thromboembolism    PAT (paroxysmal atrial tachycardia)    Paroxysmal atrial fibrillation    Hypertension    Mild ascending aorta dilatation    Lumbar radiculopathy    Light headed    Indigestion    Hyperlipidemia    Heart valve disease    Heart palpitations    Diabetic nephropathy    Type 2 diabetes mellitus with diabetic chronic kidney disease    Degeneration of lumbar intervertebral disc    Cardiac pacemaker    Bradycardia    Peripheral eosinophilia    Chronic cough    Dyspnea on exertion    Seasonal allergies    Elevated IgE level    Wheezing    Asthma    Cardiac arrhythmia     Past Medical History:   Diagnosis Date    Asthma     Atrial fibrillation     Back pain     Breast cancer     Cancer     breast    Cirrhosis of liver not due to alcohol     Diabetes mellitus     GERD (gastroesophageal reflux disease)     Hypertension     Kidney stone     Neuropathy     Sleep apnea     CPAP     Past Surgical History:   Procedure Laterality Date     SECTION       CHOLECYSTECTOMY      COLONOSCOPY N/A 02/08/2022    Procedure: COLONOSCOPY WITH HOT SNARE PIECE MEAL POLYPECTOMY, ORISE INJECTION;  Surgeon: Pierre Delong MD;  Location: [facility] ENDOSCOPY;  Service: Gastroenterology;  Laterality: N/A;  COLON POLYPS    COLONOSCOPY N/A 07/21/2022    Procedure: COLONOSCOPY WITH POLYPECTOMY, CAUTERY;  Surgeon: Pierre Delong MD;  Location: [facility] ENDOSCOPY;  Service: Gastroenterology;  Laterality: N/A;  COLON POLYP    COLONOSCOPY N/A 01/31/2023    Procedure: COLONOSCOPY WITH HOT SNARE POLYPECTOMY;  Surgeon: Pierre Delong MD;  Location: [facility] ENDOSCOPY;  Service: Gastroenterology;  Laterality: N/A;  HEMORRHOID, COLON POLYP    ENDOSCOPY N/A 02/08/2022    Procedure: ESOPHAGOGASTRODUODENOSCOPY WITH HOT SNARE POLYPECTOMY, BIOPSIES, ORISE INJECTION AND CLIP APPLICATION X1;  Surgeon: Pierre Delong MD;  Location: [facility] ENDOSCOPY;  Service: Gastroenterology;  Laterality: N/A;  GASTRIC POLYP AND HIATAL HERNIA    EYE SURGERY Bilateral     cataracts    HERNIA REPAIR      inguinal    HYSTERECTOMY      KIDNEY STONE SURGERY      MASTECTOMY Bilateral     PACEMAKER IMPLANTATION       PT Assessment (last 12 hours)       PT Evaluation and Treatment       Row Name 02/20/24 1500          Physical Therapy Time and Intention    Subjective Information no complaints  -NAVARRO     Document Type evaluation  -NAVARRO     Mode of Treatment individual therapy;physical therapy  -NAVARRO     Patient Effort good  -NAVARRO       Row Name 02/20/24 1500          General Information    Patient Observations alert;cooperative;agree to therapy  -NAVARRO     Prior Level of Function independent:;all household mobility;community mobility  -NAVARRO     Existing Precautions/Restrictions fall  -NAVARRO     Barriers to Rehab none identified  -NAVARRO       Row Name 02/20/24 1500          Living Environment    Current Living Arrangements home  -NAVARRO     People in Home alone  -NAVARRO       Row Name 02/20/24 1500          Home Use of  Assistive/Adaptive Equipment    Equipment Currently Used at Home walker, standard;rollator  -NAVARRO       Row Name 02/20/24 1500          Range of Motion (ROM)    Range of Motion ROM is [facility]  -NAVARRO       [facility] Name 02/20/24 1500          Strength (Manual Muscle Testing)    Strength (Manual Muscle Testing) strength is L  -NAVARRO       [facility] Name 02/20/24 1500          Bed Mobility    Bed Mobility bed mobility (all) activities;supine-sit  -NAVARRO     All Activities, [place] (Bed Mobility) standby assist  -NAVARRO     Supine-Sit [place] (Bed Mobility) contact guard  -NAVARRO       Row Name 02/20/24 1500          Transfers    Transfers bed-chair transfer;sit-stand transfer  -NAVARRO       Row Name 02/20/24 1500          Bed-Chair Transfer    Bed-Chair [place] (Transfers) minimum assist (75% patient effort)  -NAVARRO     Assistive Device (Bed-Chair Transfers) walker, front-wheeled  -NAVARRO       Row Name 02/20/24 1500          Sit-Stand Transfer    Sit-Stand [place] (Transfers) minimum assist (75% patient effort)  -NAVARRO     Assistive Device (Sit-Stand Transfers) walker, front-wheeled  -NAVARRO       Row Name 02/20/24 1500          Gait/Stairs (Locomotion)    Gait/Stairs Locomotion gait/ambulation assistive device  -NAVARRO     [place] Level (Gait) minimum assist (75% patient effort)  -NAVARRO     Assistive Device (Gait) walker, front-wheeled  -NAVARRO     Distance in Feet (Gait) 20  very unsteady  -NAVARRO       Row Name 02/20/24 1500          Safety Issues, Functional Mobility    Impairments Affecting Function (Mobility) balance;pain;strength  -NAVARRO       Row Name 02/20/24 1500          Balance    Balance Assessment standing dynamic balance  -NAVARRO     Dynamic Standing Balance minimal assist  -NAVARRO     Position/Device Used, Standing Balance walker, front-wheeled  -NAVARRO       Row Name 02/20/24 1500          Plan of Care Review    Plan of Care Reviewed With patient  -NAVARRO     Outcome Evaluation Patient presents with decreased strength, transfers and ambulation.  Skilled  physical therapy services will be required to address these mobility deficits.  -NAVARRO       Row Name 02/20/24 1500          Therapy Assessment/Plan (PT)    Rehab Potential (PT) good, to achieve stated therapy goals  -NAVARRO     Criteria for Skilled Interventions Met (PT) skilled treatment is necessary  -NAVARRO     Therapy Frequency (PT) daily  -NAVARRO     Predicted Duration of Therapy Intervention (PT) 10 days  -NAVARRO     Problem List (PT) problems related to;balance;mobility;strength  -NAVARRO     Activity Limitations Related to Problem List (PT) unable to transfer safely;unable to ambulate safely  -NAVARRO       Row Name 02/20/24 1500          PT Evaluation Complexity    History, PT Evaluation Complexity no personal factors and/or comorbidities  -NAVARRO     Examination of Body Systems (PT Eval Complexity) total of 4 or more elements  -NAVARRO     Clinical Presentation (PT Evaluation Complexity) stable  -NAVARRO     Clinical Decision Making (PT Evaluation Complexity) low complexity  -NAVARRO     Overall Complexity (PT Evaluation Complexity) low complexity  -NAVARRO       Row Name 02/20/24 1500          Therapy Plan Review/Discharge Plan (PT)    Therapy Plan Review (PT) evaluation/treatment results reviewed;participants voiced agreement with care plan;participants included;patient  -NAVARRO       Row Name 02/20/24 1500          Physical Therapy Goals    Transfer Goal Selection (PT) transfer, PT goal 1  -NAVARRO     Gait Training Goal Selection (PT) gait training, PT goal 1  -NAVARRO       Row Name 02/20/24 1500          Transfer Goal 1 (PT)    Activity/Assistive Device (Transfer Goal 1, PT) transfers, all  -NAVARRO     [place] Level/Cues Needed (Transfer Goal 1, PT) independent  -NAVARRO     Time Frame (Transfer Goal 1, PT) long term goal (LTG);10 days  -NAVARRO       Row Name 02/20/24 1500          Gait Training Goal 1 (PT)    Activity/Assistive Device (Gait Training Goal 1, PT) gait (walking locomotion);assistive device use;walker, rolling  -NAVARRO     [place] Level (Gait Training Goal  1, PT) independent  -NAVARRO     Distance (Gait Training Goal 1, PT) 300  -NAVARRO     Time Frame (Gait Training Goal 1, PT) long term goal (LTG);10 days  -NAVARRO               User Key  (r) = Recorded By, (t) = Taken By, (c) = Cosigned By      Initials Name Provider Type    Graham Vera PT Physical Therapist                      PT Recommendation and Plan  Anticipated Discharge Disposition (PT): inpatient rehabilitation facility  Planned Therapy Interventions (PT): balance training, bed mobility training, gait training, home exercise program, stair training, strengthening, transfer training  Therapy Frequency (PT): daily  Plan of Care Reviewed With: patient  Outcome Evaluation: Patient presents with decreased strength, transfers and ambulation.  Skilled physical therapy services will be required to address these mobility deficits.   Outcome Measures       Row Name 02/20/24 1500             How much help from another person do you currently need...    Turning from your back to your side while in flat bed without using bedrails? 4  -NAVARRO      Moving from lying on back to sitting on the side of a flat bed without bedrails? 4  -NAVARRO      Moving to and from a bed to a chair (including a wheelchair)? 3  -NAVARRO      Standing up from a chair using your arms (e.g., wheelchair, bedside chair)? 3  -NAVARRO      Climbing 3-5 steps with a railing? 3  -NAVARRO      To walk in hospital room? 3  -NAVARRO      AM-PAC 6 Clicks Score (PT) 20  -NAVARRO      Highest Level of Mobility Goal 6 --> Walk 10 steps or more  -NAVARRO         Functional Assessment    Outcome Measure Options AM-PAC 6 Clicks Basic Mobility (PT)  -NAVARRO                User Key  (r) = Recorded By, (t) = Taken By, (c) = Cosigned By      Initials Name Provider Type    Graham Vera, PT Physical Therapist                     Time Calculation:    PT Charges       Row Name 02/20/24 1516             Time Calculation    PT Received On 02/20/24  -NAVARRO      PT Goal Re-Cert Due Date 02/29/24  -NAVARRO          Untimed Charges    PT Eval/Re-eval Minutes 20  -NAVARRO         Total Minutes    Untimed Charges Total Minutes 20  -NAVARRO       Total Minutes 20  -NAVARRO                User Key  (r) = Recorded By, (t) = Taken By, (c) = Cosigned By      Initials Name Provider Type    Graham Vera, PT Physical Therapist                  Therapy Charges for Today       Code Description Service Date Service Provider Modifiers Qty    18138995025 HC PT EVAL LOW COMPLEXITY 2 2/20/2024 Graham Charles, PT GP 1            PT G-Codes  Outcome Measure Options: AM-PAC 6 Clicks Basic Mobility (PT)  AM-PAC 6 Clicks Score (PT): 20    Graham Charles PT  2/20/2024

## 2024-02-20 NOTE — CONSULTS
Date of service: 2024    Reason for consultation: Cardiac arrhythmia.    HPI:An 83-year-old female was admitted with one-week history of generalized weakness and exertional dyspnea with minimal activities.  She had no chest pain, orthopnea, palpitations, syncope or near syncope.  She had no recent pneumonia, bronchitis or Covid infection.  In the ER, she was found to be in atrial fibrillation with RVR.  She responded  to IV Cardizem.    Review of systems: Negative for headache, tinnitus, vertigo, nausea, vomiting, abdominal pain, fever, chills, GI bleed, TIA or CVA like symptoms.    Past medical and surgical history:    Paroxysmal atrial fibrillation, on oral anticoagulation. Currently, she is in sinus tachycardia  2.   Hypertensive heart disease with mild LVH.  3.   Telemetry: Sinus tachycardia with intermittent paced ventricular rhythm.  4.   Sick sinus syndrome, status post pacemaker placement.  5.   Mild ascending aortic dilatation.  6.   DM-II   7.   Chronic renal disease.    Medications: See the patient's medication list.    Allergies: Sudafed and lisinopril.    Social history: Never smoked, drank alcohol or used illicit drugs.  A .    Family history: A brother  suddenly in his 70s.  An uncle  of heart problems in his 30s.  Hypertension and DM run in the family.    Physical examination: She was in no pain or respiratory distress.  Vital signs: Reviewed.  HEENT: Sclerae: Nonicteric.  EOMI.  Neck: No JVD or carotid bruit.  Chest: CTA.  No wheezes, rales.  Cardiac: RRR.  2/6 systolic murmur over the right and left sternal borders.  No S3 gallop.  Abdomen: Obese, soft and nontender.  No megalies or masses.  Bowel sounds: Present.  Neuro: Alert, oriented ×3, no facial droop and speech was clear.    Records: Reviewed.    Assessment and plan:    Paroxysmal atrial fibrillation, on oral anticoagulation. Currently, she is in sinus tachycardia  2.   Hypertensive heart disease with mild LVH.  3.    Telemetry: Sinus tachycardia with intermittent paced ventricular rhythm.  4.   Sick sinus syndrome, status post pacemaker placement.  5.   Mitral and tricuspid regurgitation.  6.  DM type II  7.  Chronic renal disease.  8.  Change metoprolol succinate to 75 mg by mouth twice a day  9.  We'll review her echocardiography that was done on 2/19/2024

## 2024-02-20 NOTE — H&P
[facility]   HISTORY AND PHYSICAL    Patient Name: Bella Ritter  : 1940  MRN: [id number]  Primary Care Physician:  Cindy Sal MD  Date of admission: 2024    Subjective   Subjective     Chief Complaint: Dyspnea on exertion    HPI:    Bella Ritter is a 83 y.o. female with past medical history of diabetes, hypertension, hyperlipidemia, UNIQUE, cirrhosis, A-fib on Eliquis, CHF and GERD presented to the ED with complaints of dyspnea on exertion and generalized weakness.  Patient states that for the last week she has been having worsening dyspnea on exertion to where she is symptomatic after just taking a few steps along with generalized weakness.  Due to her worsening symptoms patient came to the ED for further evaluation.  In the ED patient was tachycardic on arrival with EKG showing wide-complex tachycardia which responded well to IV Cardizem.  Labs showed that she had a significantly elevated proBNP level with reduced renal function but remaining labs were unremarkable given her chronic conditions including a negative COVID flu RSV, normal lactic acid, normal magnesium, and normal urinalysis.  Her chest x-ray showed cardiomegaly but no other significant findings.  When asked she denied any recent fevers, chills, headaches, focal weakness, chest pain, palpitation, abdominal pain, nausea, vomiting, diarrhea, constipation, dysuria, hematuria, hematochezia, melena, or anxiety.  Patient admitted for further evaluation and treatment.    Review of Systems   All systems were reviewed and negative except for: As per HPI    Personal History     Past Medical History:   Diagnosis Date    Asthma     Atrial fibrillation     Back pain     Breast cancer     Cancer     breast    Cirrhosis of liver not due to alcohol     Diabetes mellitus     GERD (gastroesophageal reflux disease)     Hypertension     Kidney stone     Neuropathy     Sleep apnea     CPAP       Past Surgical History:   Procedure Laterality  Date     SECTION      CHOLECYSTECTOMY      COLONOSCOPY N/A 2022    Procedure: COLONOSCOPY WITH HOT SNARE PIECE MEAL POLYPECTOMY, ORISE INJECTION;  Surgeon: Pierre Delong MD;  Location: [facility] ENDOSCOPY;  Service: Gastroenterology;  Laterality: N/A;  COLON POLYPS    COLONOSCOPY N/A 2022    Procedure: COLONOSCOPY WITH POLYPECTOMY, CAUTERY;  Surgeon: Pierre Delong MD;  Location: [facility] ENDOSCOPY;  Service: Gastroenterology;  Laterality: N/A;  COLON POLYP    COLONOSCOPY N/A 2023    Procedure: COLONOSCOPY WITH HOT SNARE POLYPECTOMY;  Surgeon: Pierre Delong MD;  Location: [facility] ENDOSCOPY;  Service: Gastroenterology;  Laterality: N/A;  HEMORRHOID, COLON POLYP    ENDOSCOPY N/A 2022    Procedure: ESOPHAGOGASTRODUODENOSCOPY WITH HOT SNARE POLYPECTOMY, BIOPSIES, ORISE INJECTION AND CLIP APPLICATION X1;  Surgeon: Pierre Delong MD;  Location: [facility] ENDOSCOPY;  Service: Gastroenterology;  Laterality: N/A;  GASTRIC POLYP AND HIATAL HERNIA    EYE SURGERY Bilateral     cataracts    HERNIA REPAIR      inguinal    HYSTERECTOMY      KIDNEY STONE SURGERY      MASTECTOMY Bilateral     PACEMAKER IMPLANTATION         Family History: family history includes Colon polyps in her father and another family member. Otherwise pertinent FHx was reviewed and not pertinent to current issue.    Social History:  reports that she has never smoked. She has been exposed to tobacco smoke. She has never used smokeless tobacco. She reports that she does not drink alcohol and does not use drugs.    Home Medications:  Dulaglutide, apixaban, cetirizine, fluticasone-salmeterol, gabapentin, glucose blood, hydrALAZINE, insulin aspart, insulin glargine, levalbuterol, metoprolol succinate XL, montelukast, olmesartan, pantoprazole, and spironolactone-hydrochlorothiazide      Allergies:  Allergies   Allergen Reactions    Aricept [Donepezil] Unknown - High Severity    Diphenhydramine Unknown - Low Severity     Lisinopril Swelling    Nsaids Unknown - High Severity and Unknown - Low Severity    Pseudoephedrine Swelling       Objective   Objective     Vitals:   Temp:  [97.3 °F (36.3 °C)-97.9 °F (36.6 °C)] 97.7 °F (36.5 °C)  Heart Rate:  [] 119  Resp:  [13-18] 18  BP: (105-115)/(65-83) 105/80  Physical Exam    Constitutional: Awake, alert   Eyes: PERRLA, sclerae anicteric, no conjunctival injection   HENT: NCAT, mucous membranes moist   Neck: Supple, no thyromegaly, no lymphadenopathy, trachea midline   Respiratory: Clear to auscultation bilaterally, nonlabored respirations    Cardiovascular: Irregular rhythm, tachycardia, systolic murmur, no rubs, or gallops, palpable pedal pulses bilaterally   Gastrointestinal: Positive bowel sounds, soft, nontender, nondistended   Musculoskeletal: Bilateral ankle edema, no clubbing or cyanosis to extremities   Psychiatric: Appropriate affect, cooperative   Neurologic: Oriented x 3, strength symmetric in all extremities, Cranial Nerves grossly intact to confrontation, speech clear   Skin: No rashes     Result Review    Result Review:  I have personally reviewed the results from the time of this admission to 2/20/2024 00:30 EST and agree with these findings:  [x]  Laboratory list / accordion  []  Microbiology  [x]  Radiology  [x]  EKG/Telemetry   []  Cardiology/Vascular   []  Pathology  []  Old records  []  Other:  Most notable findings include: Elevated proBNP, reduced renal function, negative COVID flu RSV, negative UA, chest x-ray unremarkable      Assessment & Plan   Assessment / Plan     Brief Patient Summary:  Bella Ritter is a 83 y.o. female with past medical history of diabetes, hypertension, hyperlipidemia, UNIQUE, cirrhosis, A-fib on Eliquis, CHF and GERD presented to the ED with complaints of dyspnea on exertion and generalized weakness    Active Hospital Problems:  Active Hospital Problems    Diagnosis     **Cardiac arrhythmia     Dyspnea on exertion     Hypertension      Stage 3a chronic kidney disease     Type 2 diabetes mellitus with diabetic chronic kidney disease     Diabetic nephropathy     Heart valve disease     SSS (sick sinus syndrome)     Cardiac pacemaker     Paroxysmal atrial fibrillation      Plan:     Persistent tachycardia  -Admitted telemetry  -EKG wide-complex tachycardia  -20 mg IV Cardizem given in the ED  -Resume home meds troponin mildly elevated, will trend  -Elevated proBNP  -TSH   -Patient with pacemaker.  Will need to be interrogated  -History of CHF ans SSS  -Patient's cardiologist consulted  -Supportive care    Dyspnea on exertion  -Patient with history of CHF  -Elevated proBNP  -X-ray unremarkable  -Patient's home blood pressure medicines may need to be adjusted.  Hypotensive throughout the day  -Echocardiogram ordered  -Cardiology as above    Diabetes  -Insulin sliding scale  -Levemir at bedtime  -Titrate as needed    HTN  -Currently well controlled  -PRN BP meds  -Resume home meds when available  -Titrate if needed    A-fib  -Resume home Eliquis    UNIQUE  -BiPAP    Hx CAD    GI ppx  DVT ppx    CODE STATUS:    Level Of Support Discussed With: Patient  Code Status (Patient has no pulse and is not breathing): CPR (Attempt to Resuscitate)  Medical Interventions (Patient has pulse or is breathing): Full Support    Admission Status:  I believe this patient meets inpatient status.      Electronically signed by Javier He MD, 02/20/24, 12:30 AM EST.

## 2024-02-20 NOTE — SIGNIFICANT NOTE
02/20/24 1030   Coping/Psychosocial   Observed Emotional State calm;cooperative   Verbalized Emotional State hopefulness   Trust Relationship/Rapport empathic listening provided   Involvement in Care interacting with patient   Additional Documentation Spiritual Care (Group)   Spiritual Care   Use of Spiritual Resources non-Adventism use of spiritual care   Spiritual Care Source  initiative   Spiritual Care Follow-Up follow-up, none required as presently assessed   Response to Spiritual Care receptive of support;engaged in conversation   Spiritual Care Interventions supportive conversation provided   Spiritual Care Visit Type initial   Receptivity to Spiritual Care visit welcomed

## 2024-02-20 NOTE — PROGRESS NOTES
RT EQUIPMENT DEVICE RELATED - SKIN ASSESSMENT    RT Medical Equipment/Device:     NIV Mask:  Under-the-nose   size:  B    Skin Assessment:      Cheek:  Intact  Nose:  Intact    Device Skin Pressure Protection:  Skin-to-device areas padded:  None Required    Nurse Notification:  Mallory Ramirez, RRT

## 2024-02-20 NOTE — ED PROVIDER NOTES
"SHARED VISIT NOTE:    Patient is 83 y.o. year old female that presents to the ED for evaluation of shortness of breath.     Physical Exam    ED Course:    /77 (BP Location: Left arm, Patient Position: Sitting)   Pulse 76   Temp 97.9 °F (36.6 °C) (Oral)   Resp 18   Ht 152.4 cm (60\")   Wt 94.2 kg (207 lb 10.8 oz)   SpO2 98%   BMI 40.56 kg/m²   Results for orders placed or performed during the hospital encounter of 02/19/24   COVID-19, FLU A/B, RSV PCR 1 HR TAT - Swab, Nasopharynx    Specimen: Nasopharynx; Swab   Result Value Ref Range    COVID19 Not Detected Not Detected - Ref. Range    Influenza A PCR Not Detected Not Detected    Influenza B PCR Not Detected Not Detected    RSV, PCR Not Detected Not Detected   Comprehensive Metabolic Panel    Specimen: Blood   Result Value Ref Range    Glucose 156 (H) 65 - 99 mg/dL    BUN 31 (H) 8 - 23 mg/dL    Creatinine 1.65 (H) 0.57 - 1.00 mg/dL    Sodium 137 136 - 145 mmol/L    Potassium 4.2 3.5 - 5.2 mmol/L    Chloride 101 98 - 107 mmol/L    CO2 25.9 22.0 - 29.0 mmol/L    Calcium 9.0 8.6 - 10.5 mg/dL    Total Protein 6.6 6.0 - 8.5 g/dL    Albumin 3.7 3.5 - 5.2 g/dL    ALT (SGPT) 32 1 - 33 U/L    AST (SGOT) 25 1 - 32 U/L    Alkaline Phosphatase 83 39 - 117 U/L    Total Bilirubin 0.3 0.0 - 1.2 mg/dL    Globulin 2.9 gm/dL    A/G Ratio 1.3 g/dL    BUN/Creatinine Ratio 18.8 7.0 - 25.0    Anion Gap 10.1 5.0 - 15.0 mmol/L    eGFR 30.3 (L) >60.0 mL/min/1.73   Magnesium    Specimen: Blood   Result Value Ref Range    Magnesium 2.2 1.6 - 2.4 mg/dL   Single High Sensitivity Troponin T    Specimen: Blood   Result Value Ref Range    HS Troponin T 17 (H) <14 ng/L   CBC Auto Differential    Specimen: Blood   Result Value Ref Range    WBC 9.72 3.40 - 10.80 10*3/mm3    RBC 4.92 3.77 - 5.28 10*6/mm3    Hemoglobin 14.8 12.0 - 15.9 g/dL    Hematocrit 45.7 34.0 - 46.6 %    MCV 92.9 79.0 - 97.0 fL    MCH 30.1 26.6 - 33.0 pg    MCHC 32.4 31.5 - 35.7 g/dL    RDW 13.0 12.3 - 15.4 %    RDW-SD " 44.0 37.0 - 54.0 fl    MPV 10.1 6.0 - 12.0 fL    Platelets 267 140 - 450 10*3/mm3    Neutrophil % 63.6 42.7 - 76.0 %    Lymphocyte % 23.3 19.6 - 45.3 %    Monocyte % 10.2 5.0 - 12.0 %    Eosinophil % 2.1 0.3 - 6.2 %    Basophil % 0.5 0.0 - 1.5 %    Immature Grans % 0.3 0.0 - 0.5 %    Neutrophils, Absolute 6.19 1.70 - 7.00 10*3/mm3    Lymphocytes, Absolute 2.26 0.70 - 3.10 10*3/mm3    Monocytes, Absolute 0.99 (H) 0.10 - 0.90 10*3/mm3    Eosinophils, Absolute 0.20 0.00 - 0.40 10*3/mm3    Basophils, Absolute 0.05 0.00 - 0.20 10*3/mm3    Immature Grans, Absolute 0.03 0.00 - 0.05 10*3/mm3    nRBC 0.0 0.0 - 0.2 /100 WBC   Urinalysis With Culture If Indicated - Urine, Clean Catch    Specimen: Urine, Clean Catch   Result Value Ref Range    Color, UA Yellow Yellow, Straw    Appearance, UA Clear Clear    pH, UA 6.0 5.0 - 8.0    Specific Gravity, UA 1.008 1.005 - 1.030    Glucose, UA Negative Negative    Ketones, UA Negative Negative    Bilirubin, UA Negative Negative    Blood, UA Negative Negative    Protein, UA Negative Negative    Leuk Esterase, UA Negative Negative    Nitrite, UA Negative Negative    Urobilinogen, UA 0.2 E.U./dL 0.2 - 1.0 E.U./dL   Protime-INR    Specimen: Blood   Result Value Ref Range    Protime 15.8 (H) 11.8 - 14.9 Seconds    INR 1.24 (H) 0.86 - 1.15   BNP    Specimen: Blood   Result Value Ref Range    proBNP 4,981.0 (H) 0.0 - 1,800.0 pg/mL   Lactic Acid, Plasma    Specimen: Blood   Result Value Ref Range    Lactate 1.6 0.5 - 2.0 mmol/L   ECG 12 Lead ED Triage Standing Order; Syncope   Result Value Ref Range    QT Interval 327 ms    QTC Interval 484 ms   Green Top (Gel)   Result Value Ref Range    Extra Tube Hold for add-ons.    Lavender Top   Result Value Ref Range    Extra Tube hold for add-on    Gold Top - SST   Result Value Ref Range    Extra Tube Hold for add-ons.    Light Blue Top   Result Value Ref Range    Extra Tube Hold for add-ons.      Medications   sodium chloride 0.9 % flush 10 mL (has no  administration in time range)   dilTIAZem (CARDIZEM) injection 20 mg (20 mg Intravenous Given 2/19/24 1719)   furosemide (LASIX) injection 40 mg (40 mg Intravenous Given 2/19/24 1840)     XR Chest 1 View    Result Date: 2/19/2024  Narrative: PROCEDURE: XR CHEST 1 VW  COMPARISON: [facility], CT, CT CHEST W CONTRAST DIAGNOSTIC, 5/10/2023, 15:13.  INDICATIONS: WEAKNESS TODAY  FINDINGS:  The heart looks enlarged.  A cardiac pacemaker device is present.  There are no infiltrates or obvious pleural effusions.  Patient has had a right mastectomy.      Impression:   1. Cardiomegaly.       CRIS ONEIL MD       Electronically Signed and Approved By: CRIS ONEIL MD on 2/19/2024 at 16:36              MDM:    Procedures          SHARED VISIT ATTESTATION:    This visit was performed by both myself and an APC.  I performed the substantive portion of the medical decision making. The management plan was made or approved by me, and I take responsibility for patient management.           Lance Greer MD  19:03 EST  02/19/24         Lance Greer MD  02/19/24 9100

## 2024-02-20 NOTE — H&P
[facility]   HISTORY AND PHYSICAL    Patient Name: Bella Ritter  : 1940  MRN: [id number]  Primary Care Physician:  Cindy Sal MD  Date of admission: 2024    Subjective   Subjective     Chief Complaint:   Weakness and shortness of breath      HPI:    Bella Ritter is a 83 y.o. female came to emergency room with increasing weakness of several days duration along with shortness of breath.  Patient also had increased heart rate.  She was given Cardizem, admitted to medical service after discussion with cardiologist.  Patient admitted to PCU doing better when I saw her this morning she is awake alert, feeling much better, less short of breath.        Review of Systems:    Fatigue and weakness, no chest pain, shortness of breath, especially with exertion.  No fever chills  Some weight gain and swelling    Personal History     Past Medical History:   Diagnosis Date    Asthma     Atrial fibrillation     Back pain     Breast cancer     Cancer     breast    Cirrhosis of liver not due to alcohol     Diabetes mellitus     GERD (gastroesophageal reflux disease)     Hypertension     Kidney stone     Neuropathy     Sleep apnea     CPAP       Past Surgical History:   Procedure Laterality Date     SECTION      CHOLECYSTECTOMY      COLONOSCOPY N/A 2022    Procedure: COLONOSCOPY WITH HOT SNARE PIECE MEAL POLYPECTOMY, ORISE INJECTION;  Surgeon: Pierre Delong MD;  Location: [facility] ENDOSCOPY;  Service: Gastroenterology;  Laterality: N/A;  COLON POLYPS    COLONOSCOPY N/A 2022    Procedure: COLONOSCOPY WITH POLYPECTOMY, CAUTERY;  Surgeon: Pierre Delong MD;  Location: [facility] ENDOSCOPY;  Service: Gastroenterology;  Laterality: N/A;  COLON POLYP    COLONOSCOPY N/A 2023    Procedure: COLONOSCOPY WITH HOT SNARE POLYPECTOMY;  Surgeon: Pierre Delong MD;  Location: [facility] ENDOSCOPY;  Service: Gastroenterology;  Laterality: N/A;  HEMORRHOID, COLON POLYP    ENDOSCOPY N/A  02/08/2022    Procedure: ESOPHAGOGASTRODUODENOSCOPY WITH HOT SNARE POLYPECTOMY, BIOPSIES, ORISE INJECTION AND CLIP APPLICATION X1;  Surgeon: Pierre Delong MD;  Location: [facility] ENDOSCOPY;  Service: Gastroenterology;  Laterality: N/A;  GASTRIC POLYP AND HIATAL HERNIA    EYE SURGERY Bilateral     cataracts    HERNIA REPAIR      inguinal    HYSTERECTOMY      KIDNEY STONE SURGERY      MASTECTOMY Bilateral     PACEMAKER IMPLANTATION         Family History: family history includes Colon polyps in her father and another family member. Otherwise pertinent FHx was reviewed and not pertinent to current issue.    Social History:  reports that she has never smoked. She has been exposed to tobacco smoke. She has never used smokeless tobacco. She reports that she does not drink alcohol and does not use drugs.    Home Medications:  Dulaglutide, apixaban, cetirizine, fluticasone-salmeterol, gabapentin, glucose blood, hydrALAZINE, insulin aspart, insulin glargine, levalbuterol, metoprolol succinate XL, montelukast, olmesartan, pantoprazole, and spironolactone-hydrochlorothiazide      Allergies:  Allergies   Allergen Reactions    Aricept [Donepezil] Unknown - High Severity    Diphenhydramine Unknown - Low Severity    Lisinopril Swelling    Nsaids Unknown - High Severity and Unknown - Low Severity    Pseudoephedrine Swelling       Objective   Objective     Vitals:   Temp:  [97.3 °F (36.3 °C)-97.9 °F (36.6 °C)] 97.3 °F (36.3 °C)  Heart Rate:  [] 114  Resp:  [13-19] 19  BP: (105-122)/(65-86) 122/86    Physical Exam    Elderly female not in acute distress, looks younger than her stated age, awake alert and oriented.  Neck without any JVD not using any accessory muscles trachea central.  Heart regular.  Grade 2 systolic murmur  Lungs diminished breath sounds.  Occasional rhonchi and crackles  Abdominal obese and soft.  Extremities trace of edema      I have personally reviewed the results from the time of this admission to  2/20/2024 08:36 EST and agree with these findings:  [x]  Laboratory  []  Microbiology  []  Radiology  []  EKG/Telemetry   []  Cardiology/Vascular   []  Pathology  []  Old records  []  Other:    CBC:    WBC   Date Value Ref Range Status   02/19/2024 9.72 3.40 - 10.80 10*3/mm3 Final     RBC   Date Value Ref Range Status   02/19/2024 4.92 3.77 - 5.28 10*6/mm3 Final     Hemoglobin   Date Value Ref Range Status   02/19/2024 14.8 12.0 - 15.9 g/dL Final     Hematocrit   Date Value Ref Range Status   02/19/2024 45.7 34.0 - 46.6 % Final     MCV   Date Value Ref Range Status   02/19/2024 92.9 79.0 - 97.0 fL Final     MCH   Date Value Ref Range Status   02/19/2024 30.1 26.6 - 33.0 pg Final     MCHC   Date Value Ref Range Status   02/19/2024 32.4 31.5 - 35.7 g/dL Final     RDW   Date Value Ref Range Status   02/19/2024 13.0 12.3 - 15.4 % Final     RDW-SD   Date Value Ref Range Status   02/19/2024 44.0 37.0 - 54.0 fl Final     MPV   Date Value Ref Range Status   02/19/2024 10.1 6.0 - 12.0 fL Final     Platelets   Date Value Ref Range Status   02/19/2024 267 140 - 450 10*3/mm3 Final     Neutrophil %   Date Value Ref Range Status   02/19/2024 63.6 42.7 - 76.0 % Final     Lymphocyte %   Date Value Ref Range Status   02/19/2024 23.3 19.6 - 45.3 % Final     Monocyte %   Date Value Ref Range Status   02/19/2024 10.2 5.0 - 12.0 % Final     Eosinophil %   Date Value Ref Range Status   02/19/2024 2.1 0.3 - 6.2 % Final     Basophil %   Date Value Ref Range Status   02/19/2024 0.5 0.0 - 1.5 % Final     Immature Grans %   Date Value Ref Range Status   02/19/2024 0.3 0.0 - 0.5 % Final     Neutrophils, Absolute   Date Value Ref Range Status   02/19/2024 6.19 1.70 - 7.00 10*3/mm3 Final     Lymphocytes, Absolute   Date Value Ref Range Status   02/19/2024 2.26 0.70 - 3.10 10*3/mm3 Final     Monocytes, Absolute   Date Value Ref Range Status   02/19/2024 0.99 (H) 0.10 - 0.90 10*3/mm3 Final     Eosinophils, Absolute   Date Value Ref Range Status    02/19/2024 0.20 0.00 - 0.40 10*3/mm3 Final     Basophils, Absolute   Date Value Ref Range Status   02/19/2024 0.05 0.00 - 0.20 10*3/mm3 Final     Immature Grans, Absolute   Date Value Ref Range Status   02/19/2024 0.03 0.00 - 0.05 10*3/mm3 Final     nRBC   Date Value Ref Range Status   02/19/2024 0.0 0.0 - 0.2 /100 WBC Final        BMP:    Lab Results   Component Value Date    GLUCOSE 190 (H) 02/20/2024    BUN 33 (H) 02/20/2024    CREATININE 1.62 (H) 02/20/2024    EGFRIFNONA 37 (L) 01/11/2022    BCR 20.4 02/20/2024    K 3.8 02/20/2024    CO2 25.5 02/20/2024    CALCIUM 9.1 02/20/2024    ALBUMIN 3.7 02/19/2024    LABIL2 1.3 (L) 12/21/2020    AST 25 02/19/2024    ALT 32 02/19/2024        XR Chest 1 View    Result Date: 2/19/2024    1. Cardiomegaly.       CRIS ONEIL MD       Electronically Signed and Approved By: CRIS ONEIL MD on 2/19/2024 at 16:36                     Assessment & Plan   Assessment / Plan       Current Diagnosis:  Active Hospital Problems    Diagnosis     **Cardiac arrhythmia     Dyspnea on exertion     Hypertension     Stage 3a chronic kidney disease     Type 2 diabetes mellitus with diabetic chronic kidney disease     Diabetic nephropathy     Heart valve disease     SSS (sick sinus syndrome)     Cardiac pacemaker     Paroxysmal atrial fibrillation      Plan:   Admit to monitored bed.  IV diuretics  Echo.  Adjust meds for rate control.  Consult cardiologist Dr. Ghassan Jimenez.  Further management be based on clinical course, lab results and consultant input.      DVT prophylaxis:  Medical DVT prophylaxis orders are present.        GI Prophylaxis:       Pepcid/Protonix    CODE STATUS:    Level Of Support Discussed With: Patient  Code Status (Patient has no pulse and is not breathing): CPR (Attempt to Resuscitate)  Medical Interventions (Patient has pulse or is breathing): Full Support    Admission Status:  I believe this patient meets inpatient status.             I have dictated this note utilizing  Dragon Dictation.             Please note that portions of this note were completed with a voice recognition program.             Part of this note may be an electronic transcription/translation of spoken language to printed text         using the Dragon Dictation System.       Electronically signed by Mustapha Duff MD, 02/20/24, 8:36 AM EST.    Total time spent with in evaluation and management:

## 2024-02-20 NOTE — PLAN OF CARE
Goal Outcome Evaluation:   Pt wore bipap last night as a new setup. Pt is now on RA

## 2024-02-21 LAB
ANION GAP SERPL CALCULATED.3IONS-SCNC: 9.4 MMOL/L (ref 5–15)
ASCENDING AORTA: 4.3 CM
BH CV ECHO MEAS - AO MAX PG: 3.1 MMHG
BH CV ECHO MEAS - AO MEAN PG: 1.91 MMHG
BH CV ECHO MEAS - AO ROOT DIAM: 3.9 CM
BH CV ECHO MEAS - AO V2 MAX: 88.6 CM/SEC
BH CV ECHO MEAS - AO V2 VTI: 13.6 CM
BH CV ECHO MEAS - AVA(I,D): 2.8 CM2
BH CV ECHO MEAS - EDV(CUBED): 20.7 ML
BH CV ECHO MEAS - EDV(MOD-SP2): 54.1 ML
BH CV ECHO MEAS - EDV(MOD-SP4): 75 ML
BH CV ECHO MEAS - EF(MOD-SP2): 47.1 %
BH CV ECHO MEAS - EF(MOD-SP4): 37.6 %
BH CV ECHO MEAS - ESV(CUBED): 12.2 ML
BH CV ECHO MEAS - ESV(MOD-SP2): 28.6 ML
BH CV ECHO MEAS - ESV(MOD-SP4): 46.8 ML
BH CV ECHO MEAS - FS: 16.2 %
BH CV ECHO MEAS - IVS/LVPW: 1.23 CM
BH CV ECHO MEAS - IVSD: 2.1 CM
BH CV ECHO MEAS - LA DIMENSION: 4.3 CM
BH CV ECHO MEAS - LV DIASTOLIC VOL/BSA (35-75): 39.8 CM2
BH CV ECHO MEAS - LV MASS(C)D: 217.9 GRAMS
BH CV ECHO MEAS - LV MAX PG: 1.87 MMHG
BH CV ECHO MEAS - LV MEAN PG: 1.06 MMHG
BH CV ECHO MEAS - LV SYSTOLIC VOL/BSA (12-30): 24.8 CM2
BH CV ECHO MEAS - LV V1 MAX: 68.4 CM/SEC
BH CV ECHO MEAS - LV V1 VTI: 12.1 CM
BH CV ECHO MEAS - LVIDD: 2.7 CM
BH CV ECHO MEAS - LVIDS: 2.3 CM
BH CV ECHO MEAS - LVOT AREA: 3.1 CM2
BH CV ECHO MEAS - LVOT DIAM: 1.99 CM
BH CV ECHO MEAS - LVPWD: 1.71 CM
BH CV ECHO MEAS - MV MAX PG: 3.3 MMHG
BH CV ECHO MEAS - MV MEAN PG: 1.63 MMHG
BH CV ECHO MEAS - MV V2 VTI: 13.6 CM
BH CV ECHO MEAS - MVA(VTI): 2.8 CM2
BH CV ECHO MEAS - RAP SYSTOLE: 3 MMHG
BH CV ECHO MEAS - RVDD: 1.96 CM
BH CV ECHO MEAS - RVSP: 31.3 MMHG
BH CV ECHO MEAS - SI(MOD-SP2): 13.5 ML/M2
BH CV ECHO MEAS - SI(MOD-SP4): 14.9 ML/M2
BH CV ECHO MEAS - SV(LVOT): 37.8 ML
BH CV ECHO MEAS - SV(MOD-SP2): 25.5 ML
BH CV ECHO MEAS - SV(MOD-SP4): 28.2 ML
BH CV ECHO MEAS - TR MAX PG: 28.3 MMHG
BH CV ECHO MEAS - TR MAX VEL: 266 CM/SEC
BUN SERPL-MCNC: 44 MG/DL (ref 8–23)
BUN/CREAT SERPL: 26.5 (ref 7–25)
CALCIUM SPEC-SCNC: 8.8 MG/DL (ref 8.6–10.5)
CHLORIDE SERPL-SCNC: 103 MMOL/L (ref 98–107)
CO2 SERPL-SCNC: 26.6 MMOL/L (ref 22–29)
CREAT SERPL-MCNC: 1.66 MG/DL (ref 0.57–1)
DEPRECATED RDW RBC AUTO: 43.3 FL (ref 37–54)
EGFRCR SERPLBLD CKD-EPI 2021: 30.1 ML/MIN/1.73
ERYTHROCYTE [DISTWIDTH] IN BLOOD BY AUTOMATED COUNT: 12.9 % (ref 12.3–15.4)
GLUCOSE BLDC GLUCOMTR-MCNC: 127 MG/DL (ref 70–99)
GLUCOSE BLDC GLUCOMTR-MCNC: 149 MG/DL (ref 70–99)
GLUCOSE BLDC GLUCOMTR-MCNC: 157 MG/DL (ref 70–99)
GLUCOSE BLDC GLUCOMTR-MCNC: 213 MG/DL (ref 70–99)
GLUCOSE SERPL-MCNC: 133 MG/DL (ref 65–99)
HCT VFR BLD AUTO: 44.1 % (ref 34–46.6)
HGB BLD-MCNC: 14.4 G/DL (ref 12–15.9)
LEFT ATRIUM VOLUME INDEX: 31.2 ML/M2
MCH RBC QN AUTO: 30 PG (ref 26.6–33)
MCHC RBC AUTO-ENTMCNC: 32.7 G/DL (ref 31.5–35.7)
MCV RBC AUTO: 91.9 FL (ref 79–97)
PLATELET # BLD AUTO: 194 10*3/MM3 (ref 140–450)
PMV BLD AUTO: 10 FL (ref 6–12)
POTASSIUM SERPL-SCNC: 4 MMOL/L (ref 3.5–5.2)
QT INTERVAL: 327 MS
QTC INTERVAL: 484 MS
RBC # BLD AUTO: 4.8 10*6/MM3 (ref 3.77–5.28)
SINUS: 3.6 CM
SODIUM SERPL-SCNC: 139 MMOL/L (ref 136–145)
STJ: 3.3 CM
WBC NRBC COR # BLD AUTO: 8.93 10*3/MM3 (ref 3.4–10.8)

## 2024-02-21 PROCEDURE — 82948 REAGENT STRIP/BLOOD GLUCOSE: CPT

## 2024-02-21 PROCEDURE — 63710000001 INSULIN DETEMIR PER 5 UNITS: Performed by: FAMILY MEDICINE

## 2024-02-21 PROCEDURE — 80048 BASIC METABOLIC PNL TOTAL CA: CPT | Performed by: FAMILY MEDICINE

## 2024-02-21 PROCEDURE — 97116 GAIT TRAINING THERAPY: CPT

## 2024-02-21 PROCEDURE — 63710000001 INSULIN LISPRO (HUMAN) PER 5 UNITS: Performed by: FAMILY MEDICINE

## 2024-02-21 PROCEDURE — 82948 REAGENT STRIP/BLOOD GLUCOSE: CPT | Performed by: FAMILY MEDICINE

## 2024-02-21 PROCEDURE — 94799 UNLISTED PULMONARY SVC/PX: CPT

## 2024-02-21 PROCEDURE — 85027 COMPLETE CBC AUTOMATED: CPT | Performed by: FAMILY MEDICINE

## 2024-02-21 RX ORDER — METOPROLOL SUCCINATE 25 MG/1
25 TABLET, EXTENDED RELEASE ORAL ONCE
Status: COMPLETED | OUTPATIENT
Start: 2024-02-21 | End: 2024-02-21

## 2024-02-21 RX ORDER — HYDROCHLOROTHIAZIDE 25 MG/1
25 TABLET ORAL DAILY
Status: DISCONTINUED | OUTPATIENT
Start: 2024-02-21 | End: 2024-02-22 | Stop reason: HOSPADM

## 2024-02-21 RX ORDER — SPIRONOLACTONE 25 MG/1
25 TABLET ORAL DAILY
Status: DISCONTINUED | OUTPATIENT
Start: 2024-02-21 | End: 2024-02-22 | Stop reason: HOSPADM

## 2024-02-21 RX ORDER — PANTOPRAZOLE SODIUM 40 MG/1
40 TABLET, DELAYED RELEASE ORAL
Status: DISCONTINUED | OUTPATIENT
Start: 2024-02-22 | End: 2024-02-22 | Stop reason: HOSPADM

## 2024-02-21 RX ADMIN — GABAPENTIN 600 MG: 300 CAPSULE ORAL at 05:36

## 2024-02-21 RX ADMIN — METOPROLOL SUCCINATE 75 MG: 25 TABLET, EXTENDED RELEASE ORAL at 20:45

## 2024-02-21 RX ADMIN — APIXABAN 5 MG: 5 TABLET, FILM COATED ORAL at 20:45

## 2024-02-21 RX ADMIN — SPIRONOLACTONE 25 MG: 25 TABLET ORAL at 15:46

## 2024-02-21 RX ADMIN — INSULIN LISPRO 2 UNITS: 100 INJECTION, SOLUTION INTRAVENOUS; SUBCUTANEOUS at 12:29

## 2024-02-21 RX ADMIN — METOPROLOL SUCCINATE 50 MG: 50 TABLET, EXTENDED RELEASE ORAL at 08:58

## 2024-02-21 RX ADMIN — APIXABAN 5 MG: 5 TABLET, FILM COATED ORAL at 08:58

## 2024-02-21 RX ADMIN — INSULIN LISPRO 4 UNITS: 100 INJECTION, SOLUTION INTRAVENOUS; SUBCUTANEOUS at 20:45

## 2024-02-21 RX ADMIN — METOPROLOL SUCCINATE 25 MG: 25 TABLET, EXTENDED RELEASE ORAL at 10:49

## 2024-02-21 RX ADMIN — MONTELUKAST 10 MG: 10 TABLET, FILM COATED ORAL at 20:45

## 2024-02-21 RX ADMIN — HYDROCHLOROTHIAZIDE 25 MG: 25 TABLET ORAL at 15:46

## 2024-02-21 RX ADMIN — INSULIN DETEMIR 30 UNITS: 100 INJECTION, SOLUTION SUBCUTANEOUS at 20:45

## 2024-02-21 RX ADMIN — Medication 10 ML: at 20:46

## 2024-02-21 RX ADMIN — Medication 10 ML: at 08:58

## 2024-02-21 NOTE — PROGRESS NOTES
RT EQUIPMENT DEVICE RELATED - SKIN ASSESSMENT    RT Medical Equipment/Device:     NIV Mask:  Under-the-nose   size:  b    Skin Assessment:      Cheek:  Intact  Neck:  Intact  Nose:  Intact    Device Skin Pressure Protection:  Skin-to-device areas padded:  None Required    Nurse Notification:  Mallory Reis, RRT

## 2024-02-21 NOTE — PROGRESS NOTES
[facility]     Progress Note    Patient Name: Bella Ritter  : 1940  MRN: [id number]  Primary Care Physician:  Cindy Sal MD  Date of admission: 2024      Subjective   Brief summary.  Follow-up on palpitations and shortness of breath      HPI:  Feeling better today and mouth somewhat dry.  Anxious.  Unable to do much activity but able to walk now.  Feels little bit stronger    Review of Systems     No chest pain, no palpitations no shortness of breath      Objective     Vitals:   Temp:  [97.2 °F (36.2 °C)-97.9 °F (36.6 °C)] 97.3 °F (36.3 °C)  Heart Rate:  [] 87  Resp:  [16-18] 16  BP: ()/(43-80) 130/62    Physical Exam :     Elderly female looks younger than her age, not in acute distress, slightly dry mucosa.  Heart regular.  Lungs clear.  Abdomen soft and obese nontender extremities free of edema      Result Review:  I have personally reviewed the results from the time of this admission to 2024 09:49 EST and agree with these findings:  [x]  Laboratory  []  Microbiology  []  Radiology  []  EKG/Telemetry   []  Cardiology/Vascular   []  Pathology  []  Old records  [x]  Other:  Echo reviewed         Assessment / Plan       Active Hospital Problems:  Active Hospital Problems    Diagnosis     **Cardiac arrhythmia     Dyspnea     Dyspnea on exertion     Hypertension     Stage 3a chronic kidney disease     Type 2 diabetes mellitus with diabetic chronic kidney disease     Diabetic nephropathy     Heart valve disease     SSS (sick sinus syndrome)     Cardiac pacemaker     Paroxysmal atrial fibrillation        Plan:   Stable and improving, restart Aldactazide.  Increase activity, increase oral water.  Decrease salt intake.  Discussed with patient.  Possible discharge tomorrow       DVT prophylaxis:  Medical DVT prophylaxis orders are present.        CODE STATUS:   Level Of Support Discussed With: Patient  Code Status (Patient has no pulse and is not breathing): CPR (Attempt to  Resuscitate)  Medical Interventions (Patient has pulse or is breathing): Full Support            Electronically signed by Mustapha Duff MD, 02/21/24, 9:49 AM EST.

## 2024-02-21 NOTE — PROGRESS NOTES
Date of service: 2/21/2024    Physical examination: She was in no pain or respiratory distress.  Vital signs: Reviewed.  HEENT: Sclerae: Nonicteric.  EOMI.  Neck: No JVD or carotid bruit.  Chest: CTA.  No wheezes, rales.  Cardiac: RRR.  2/6 systolic murmur over the right and left sternal borders.  No S3 gallop.  Abdomen: Obese, soft and nontender.  No megalies or masses.  Bowel sounds: Present.  Neuro: Alert, oriented ×3, no facial droop and speech was clear.     Records: Reviewed.     Assessment and plan:     1.  Paroxysmal atrial fibrillation, on oral anticoagulation. Currently, she is in sinus rhythm with paced atrial rhythm.  2.   Hypertensive heart disease with mild LVH.  3.   Telemetry: Sinus tachycardia with intermittent paced ventricular rhythm.  4.   Sick sinus syndrome, status post pacemaker placement.  5.   Mitral and tricuspid regurgitation.  6.   DM type II  7.   Chronic renal disease.  8.   Change metoprolol succinate to 75 mg by mouth twice a day.  She already got 50 mg x 1 this morning.  Will give give extra dose 25 mg p.o. this morning  9.  Discussed with her the echo results.

## 2024-02-21 NOTE — PLAN OF CARE
Goal Outcome Evaluation:  Plan of Care Reviewed With: patient        Progress: no change  Outcome Evaluation: Patient is on room air tolerating well and bipap is on standby will assess patient thoughout the day.

## 2024-02-21 NOTE — THERAPY TREATMENT NOTE
Acute Care - Physical Therapy Treatment Note  [facility]     Patient Name: Bella Ritter  : 1940  MRN: [id number]  Today's Date: 2024      Visit Dx:     ICD-10-CM ICD-9-CM   1. SVT (supraventricular tachycardia)  I47.10 427.89   2. Atrial fibrillation, unspecified type  I48.91 427.31   3. Weakness  R53.1 780.79   4. 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    Pulmonary thromboembolism    PAT (paroxysmal atrial tachycardia)    Paroxysmal atrial fibrillation    Hypertension    Mild ascending aorta dilatation    Lumbar radiculopathy    Light headed    Indigestion    Hyperlipidemia    Heart valve disease    Heart palpitations    Diabetic nephropathy    Type 2 diabetes mellitus with diabetic chronic kidney disease    Degeneration of lumbar intervertebral disc    Cardiac pacemaker    Bradycardia    Peripheral eosinophilia    Chronic cough    Dyspnea on exertion    Seasonal allergies    Elevated IgE level    Wheezing    Asthma    Cardiac arrhythmia    Dyspnea     Past Medical History:   Diagnosis Date    Asthma     Atrial fibrillation     Back pain     Breast cancer     Cancer     breast    Cirrhosis of liver not due to alcohol     Diabetes mellitus     GERD (gastroesophageal reflux disease)     Hypertension     Kidney stone     Neuropathy     Sleep apnea     CPAP     Past Surgical History:   Procedure Laterality Date     SECTION      CHOLECYSTECTOMY      COLONOSCOPY N/A 2022    Procedure: COLONOSCOPY WITH HOT SNARE PIECE MEAL  POLYPECTOMY, ORISE INJECTION;  Surgeon: Pierre Delong MD;  Location: [facility] ENDOSCOPY;  Service: Gastroenterology;  Laterality: N/A;  COLON POLYPS    COLONOSCOPY N/A 07/21/2022    Procedure: COLONOSCOPY WITH POLYPECTOMY, CAUTERY;  Surgeon: Pierre Delong MD;  Location: [facility] ENDOSCOPY;  Service: Gastroenterology;  Laterality: N/A;  COLON POLYP    COLONOSCOPY N/A 01/31/2023    Procedure: COLONOSCOPY WITH HOT SNARE POLYPECTOMY;  Surgeon: Pierre Delong MD;  Location: [facility] ENDOSCOPY;  Service: Gastroenterology;  Laterality: N/A;  HEMORRHOID, COLON POLYP    ENDOSCOPY N/A 02/08/2022    Procedure: ESOPHAGOGASTRODUODENOSCOPY WITH HOT SNARE POLYPECTOMY, BIOPSIES, ORISE INJECTION AND CLIP APPLICATION X1;  Surgeon: Pierre Delong MD;  Location: [facility] ENDOSCOPY;  Service: Gastroenterology;  Laterality: N/A;  GASTRIC POLYP AND HIATAL HERNIA    EYE SURGERY Bilateral     cataracts    HERNIA REPAIR      inguinal    HYSTERECTOMY      KIDNEY STONE SURGERY      MASTECTOMY Bilateral     PACEMAKER IMPLANTATION       PT Assessment (last 12 hours)       PT Evaluation and Treatment       Row Name 02/21/24 0946          Physical Therapy Time and Intention    Subjective Information complains of;weakness  -RH     Document Type therapy note (daily note)  -     Mode of Treatment physical therapy;individual therapy  -RH     Patient Effort good  -RH       Row Name 02/21/24 0946          Pain    Additional Documentation Pain Scale: FACES Pre/Post-Treatment (Group)  -       Row Name 02/21/24 0946          Pain Scale: FACES Pre/Post-Treatment    Pain: FACES Scale, Pretreatment 0-->no hurt  -RH     Posttreatment Pain Rating 0-->no hurt  -       Row Name 02/21/24 0946          Bed Mobility    Bed Mobility scooting/bridging;supine-sit  -RH     All Activities, [place] (Bed Mobility) supervision  -     Scooting/Bridging [place] (Bed Mobility) supervision  -     Supine-Sit [place] (Bed Mobility)  supervision  -       Row Name 02/21/24 0946          Transfers    Transfers sit-stand transfer;stand-sit transfer  -       Row Name 02/21/24 0946          Sit-Stand Transfer    Sit-Stand [place] (Transfers) contact guard  -RH     Assistive Device (Sit-Stand Transfers) walker, front-wheeled  -RH       Row Name 02/21/24 0946          Stand-Sit Transfer    Stand-Sit [place] (Transfers) contact guard  -RH     Assistive Device (Stand-Sit Transfers) walker, front-wheeled  -RH       Row Name 02/21/24 0946          Gait/Stairs (Locomotion)    Gait/Stairs Locomotion gait/ambulation independence;gait/ambulation assistive device;distance ambulated;gait pattern;gait deviations  -     [place] Level (Gait) contact guard  -RH     Assistive Device (Gait) walker, front-wheeled  -     Distance in Feet (Gait) 100  -RH     Pattern (Gait) 3-point;step-through  -RH     Deviations/Abnormal Patterns (Gait) base of support, narrow;gait speed decreased;stride length decreased  -[facility] Name 02/21/24 0946          Balance    Dynamic Standing Balance contact guard  -     Position/Device Used, Standing Balance walker, front-wheeled  -RH       Row Name 02/21/24 0946          Vital Signs    O2 Delivery Intra Treatment room air  -       Row Name 02/21/24 0946          Progress Summary (PT)    Progress Toward Functional Goals (PT) progress toward functional goals is fair  -RH               User Key  (r) = Recorded By, (t) = Taken By, (c) = Cosigned By      Initials Name Provider Type     Geraldo Koenig PTA Physical Therapist Assistant                      PT Recommendation and Plan     Progress Summary (PT)  Progress Toward Functional Goals (PT): progress toward functional goals is fair   Outcome Measures       Row Name 02/21/24 0900 02/20/24 1500          How much help from another person do you currently need...    Turning from your back to your side while in flat bed without using bedrails? 4  -RH 4  -NAVARRO      Moving from lying on back to sitting on the side of a flat bed without bedrails? 4  -RH 4  -NAVARRO     Moving to and from a bed to a chair (including a wheelchair)? 3  -RH 3  -NAVARRO     Standing up from a chair using your arms (e.g., wheelchair, bedside chair)? 3  -RH 3  -NAVARRO     Climbing 3-5 steps with a railing? 3  -RH 3  -NAVARRO     To walk in hospital room? 4  -RH 3  -NAVARRO     AM-PAC 6 Clicks Score (PT) 21  -RH 20  -NAVARRO     Highest Level of Mobility Goal 6 --> Walk 10 steps or more  -RH 6 --> Walk 10 steps or more  -NAVARRO        Functional Assessment    Outcome Measure Options -- AM-PAC 6 Clicks Basic Mobility (PT)  -NAVARRO               User Key  (r) = Recorded By, (t) = Taken By, (c) = Cosigned By      Initials Name Provider Type     Geraldo Koenig PTA Physical Therapist Assistant    Graham Vera, PT Physical Therapist                     Time Calculation:    PT Charges       Row Name 02/21/24 0945             Time Calculation    PT Received On 02/21/24  -RH         Timed Charges    82785 - Gait Training Minutes  5  -RH      08561 - PT Therapeutic Activity Minutes 5  -RH         Total Minutes    Timed Charges Total Minutes 10  -RH       Total Minutes 10  -RH                User Key  (r) = Recorded By, (t) = Taken By, (c) = Cosigned By      Initials Name Provider Type    Geraldo Contreras PTA Physical Therapist Assistant                  Therapy Charges for Today       Code Description Service Date Service Provider Modifiers Qty    16428545165 HC GAIT TRAINING EA 15 MIN 2/21/2024 Geraldo Koenig PTA GP 1            PT G-Codes  Outcome Measure Options: AM-PAC 6 Clicks Basic Mobility (PT)  AM-PAC 6 Clicks Score (PT): 21    Geraldo Koenig PTA  2/21/2024

## 2024-02-21 NOTE — PLAN OF CARE
Goal Outcome Evaluation:  Patient is unable to tolerate and wear our Bipap unit. She is on room air and in no respiratory distress.

## 2024-02-21 NOTE — PLAN OF CARE
Goal Outcome Evaluation:   Patient rested well through night.

## 2024-02-22 ENCOUNTER — READMISSION MANAGEMENT (OUTPATIENT)
Dept: CALL CENTER | Facility: HOSPITAL | Age: 84
End: 2024-02-22
Payer: MEDICARE

## 2024-02-22 VITALS
DIASTOLIC BLOOD PRESSURE: 68 MMHG | TEMPERATURE: 97.7 F | WEIGHT: 205.91 LBS | HEART RATE: 74 BPM | OXYGEN SATURATION: 96 % | HEIGHT: 60 IN | RESPIRATION RATE: 16 BRPM | BODY MASS INDEX: 40.43 KG/M2 | SYSTOLIC BLOOD PRESSURE: 136 MMHG

## 2024-02-22 PROBLEM — R06.00 DYSPNEA: Status: RESOLVED | Noted: 2024-02-20 | Resolved: 2024-02-22

## 2024-02-22 PROBLEM — I49.9 CARDIAC ARRHYTHMIA: Status: RESOLVED | Noted: 2024-02-19 | Resolved: 2024-02-22

## 2024-02-22 PROBLEM — R06.09 DYSPNEA ON EXERTION: Status: RESOLVED | Noted: 2023-07-06 | Resolved: 2024-02-22

## 2024-02-22 LAB
ALBUMIN SERPL-MCNC: 3.5 G/DL (ref 3.5–5.2)
ALBUMIN/GLOB SERPL: 1.3 G/DL
ALP SERPL-CCNC: 79 U/L (ref 39–117)
ALT SERPL W P-5'-P-CCNC: 25 U/L (ref 1–33)
ANION GAP SERPL CALCULATED.3IONS-SCNC: 10.1 MMOL/L (ref 5–15)
AST SERPL-CCNC: 20 U/L (ref 1–32)
BASOPHILS # BLD AUTO: 0.04 10*3/MM3 (ref 0–0.2)
BASOPHILS NFR BLD AUTO: 0.5 % (ref 0–1.5)
BILIRUB SERPL-MCNC: 0.5 MG/DL (ref 0–1.2)
BUN SERPL-MCNC: 38 MG/DL (ref 8–23)
BUN/CREAT SERPL: 22.6 (ref 7–25)
CALCIUM SPEC-SCNC: 9 MG/DL (ref 8.6–10.5)
CHLORIDE SERPL-SCNC: 104 MMOL/L (ref 98–107)
CO2 SERPL-SCNC: 24.9 MMOL/L (ref 22–29)
CREAT SERPL-MCNC: 1.68 MG/DL (ref 0.57–1)
DEPRECATED RDW RBC AUTO: 42.1 FL (ref 37–54)
EGFRCR SERPLBLD CKD-EPI 2021: 29.7 ML/MIN/1.73
EOSINOPHIL # BLD AUTO: 0.44 10*3/MM3 (ref 0–0.4)
EOSINOPHIL NFR BLD AUTO: 5.4 % (ref 0.3–6.2)
ERYTHROCYTE [DISTWIDTH] IN BLOOD BY AUTOMATED COUNT: 12.6 % (ref 12.3–15.4)
GLOBULIN UR ELPH-MCNC: 2.6 GM/DL
GLUCOSE BLDC GLUCOMTR-MCNC: 100 MG/DL (ref 70–99)
GLUCOSE SERPL-MCNC: 101 MG/DL (ref 65–99)
HCT VFR BLD AUTO: 43.8 % (ref 34–46.6)
HGB BLD-MCNC: 14.2 G/DL (ref 12–15.9)
IMM GRANULOCYTES # BLD AUTO: 0.03 10*3/MM3 (ref 0–0.05)
IMM GRANULOCYTES NFR BLD AUTO: 0.4 % (ref 0–0.5)
LYMPHOCYTES # BLD AUTO: 2.06 10*3/MM3 (ref 0.7–3.1)
LYMPHOCYTES NFR BLD AUTO: 25.3 % (ref 19.6–45.3)
MCH RBC QN AUTO: 29.7 PG (ref 26.6–33)
MCHC RBC AUTO-ENTMCNC: 32.4 G/DL (ref 31.5–35.7)
MCV RBC AUTO: 91.6 FL (ref 79–97)
MONOCYTES # BLD AUTO: 0.68 10*3/MM3 (ref 0.1–0.9)
MONOCYTES NFR BLD AUTO: 8.4 % (ref 5–12)
NEUTROPHILS NFR BLD AUTO: 4.89 10*3/MM3 (ref 1.7–7)
NEUTROPHILS NFR BLD AUTO: 60 % (ref 42.7–76)
NRBC BLD AUTO-RTO: 0 /100 WBC (ref 0–0.2)
PLATELET # BLD AUTO: 182 10*3/MM3 (ref 140–450)
PMV BLD AUTO: 10.3 FL (ref 6–12)
POTASSIUM SERPL-SCNC: 3.8 MMOL/L (ref 3.5–5.2)
PROT SERPL-MCNC: 6.1 G/DL (ref 6–8.5)
RBC # BLD AUTO: 4.78 10*6/MM3 (ref 3.77–5.28)
SODIUM SERPL-SCNC: 139 MMOL/L (ref 136–145)
WBC NRBC COR # BLD AUTO: 8.14 10*3/MM3 (ref 3.4–10.8)

## 2024-02-22 PROCEDURE — 94799 UNLISTED PULMONARY SVC/PX: CPT

## 2024-02-22 PROCEDURE — 82948 REAGENT STRIP/BLOOD GLUCOSE: CPT | Performed by: FAMILY MEDICINE

## 2024-02-22 PROCEDURE — 85025 COMPLETE CBC W/AUTO DIFF WBC: CPT | Performed by: INTERNAL MEDICINE

## 2024-02-22 PROCEDURE — 80053 COMPREHEN METABOLIC PANEL: CPT | Performed by: INTERNAL MEDICINE

## 2024-02-22 PROCEDURE — 97116 GAIT TRAINING THERAPY: CPT

## 2024-02-22 RX ORDER — METOPROLOL SUCCINATE 25 MG/1
75 TABLET, EXTENDED RELEASE ORAL EVERY 12 HOURS SCHEDULED
Qty: 180 TABLET | Refills: 0 | Status: SHIPPED | OUTPATIENT
Start: 2024-02-22 | End: 2024-03-23

## 2024-02-22 RX ADMIN — Medication 10 ML: at 08:49

## 2024-02-22 RX ADMIN — SPIRONOLACTONE 25 MG: 25 TABLET ORAL at 08:49

## 2024-02-22 RX ADMIN — PANTOPRAZOLE SODIUM 40 MG: 40 TABLET, DELAYED RELEASE ORAL at 05:17

## 2024-02-22 RX ADMIN — APIXABAN 5 MG: 5 TABLET, FILM COATED ORAL at 08:49

## 2024-02-22 RX ADMIN — HYDROCHLOROTHIAZIDE 25 MG: 25 TABLET ORAL at 08:49

## 2024-02-22 RX ADMIN — GABAPENTIN 600 MG: 300 CAPSULE ORAL at 05:17

## 2024-02-22 RX ADMIN — METOPROLOL SUCCINATE 75 MG: 25 TABLET, EXTENDED RELEASE ORAL at 08:49

## 2024-02-22 NOTE — PLAN OF CARE
Goal Outcome Evaluation:  Plan of Care Reviewed With: patient        Progress: no change  Outcome Evaluation: Patient medically cleared by MD for discharge.

## 2024-02-22 NOTE — PROGRESS NOTES
Date of service: 2/22/2024    Subjective: No chest pain, SOB, palpitations or dizziness    Review of systems: Negative for headache, vertigo, nausea, vomiting, abdominal pain, fever, chills, TIA or CVA-like symptoms.    Physical examination: She was in no pain or respiratory distress.  Vital signs: Reviewed.  HEENT: Sclerae: Nonicteric.    Neck: No JVD or carotid bruit.  Chest: CTA.  No wheezes, rales.  Cardiac: RRR.  2/6 SM over the right and left sternal borders.  No S3 gallop.  Abdomen: Obese, soft and nontender.  No megalies or masses.  Bowel sounds: Present.  Neuro: Alert, oriented ×3, no facial droop and speech was clear.     Records: Reviewed.     Assessment and plan:     1.  Paroxysmal atrial fibrillation, on oral anticoagulation. Currently, she is in sinus rhythm with paced atrial rhythm.  2.   Hypertensive heart disease with mild LVH.  3.   Telemetry: Paced atrial rhythm.  4.   Sick sinus syndrome, status post pacemaker placement.  5.   Mitral and tricuspid regurgitation.  6.   DM type II  7.   Chronic renal disease.  8.   Continue the same occasions.  9.  Cardiac: Stable.  Sign off.  She will be seen in the office as previously scheduled next month for pacemaker interrogation.

## 2024-02-22 NOTE — PLAN OF CARE
Goal Outcome Evaluation:   Patient rested well, no acute changes noted.

## 2024-02-22 NOTE — THERAPY TREATMENT NOTE
Acute Care - Physical Therapy Treatment Note  [facility]     Patient Name: Bella Ritter  : 1940  MRN: [id number]  Today's Date: 2024      Visit Dx:     ICD-10-CM ICD-9-CM   1. SVT (supraventricular tachycardia)  I47.10 427.89   2. Atrial fibrillation, unspecified type  I48.91 427.31   3. Weakness  R53.1 780.79   4. 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    Pulmonary thromboembolism    PAT (paroxysmal atrial tachycardia)    Paroxysmal atrial fibrillation    Hypertension    Mild ascending aorta dilatation    Lumbar radiculopathy    Light headed    Indigestion    Hyperlipidemia    Heart valve disease    Heart palpitations    Diabetic nephropathy    Type 2 diabetes mellitus with diabetic chronic kidney disease    Degeneration of lumbar intervertebral disc    Cardiac pacemaker    Bradycardia    Peripheral eosinophilia    Chronic cough    Seasonal allergies    Elevated IgE level    Wheezing    Asthma     Past Medical History:   Diagnosis Date    Asthma     Atrial fibrillation     Back pain     Breast cancer     Cancer     breast    Cirrhosis of liver not due to alcohol     Diabetes mellitus     GERD (gastroesophageal reflux disease)     Hypertension     Kidney stone     Neuropathy     Sleep apnea     CPAP     Past Surgical History:   Procedure Laterality Date     SECTION      CHOLECYSTECTOMY      COLONOSCOPY N/A 2022    Procedure: COLONOSCOPY WITH HOT SNARE PIECE MEAL POLYPECTOMY, ORISE INJECTION;  Surgeon: Pierre Delong  MD Frank;  Location: [facility] ENDOSCOPY;  Service: Gastroenterology;  Laterality: N/A;  COLON POLYPS    COLONOSCOPY N/A 07/21/2022    Procedure: COLONOSCOPY WITH POLYPECTOMY, CAUTERY;  Surgeon: Pierre Delong MD;  Location: [facility] ENDOSCOPY;  Service: Gastroenterology;  Laterality: N/A;  COLON POLYP    COLONOSCOPY N/A 01/31/2023    Procedure: COLONOSCOPY WITH HOT SNARE POLYPECTOMY;  Surgeon: Pierre Delong MD;  Location: [facility] ENDOSCOPY;  Service: Gastroenterology;  Laterality: N/A;  HEMORRHOID, COLON POLYP    ENDOSCOPY N/A 02/08/2022    Procedure: ESOPHAGOGASTRODUODENOSCOPY WITH HOT SNARE POLYPECTOMY, BIOPSIES, ORISE INJECTION AND CLIP APPLICATION X1;  Surgeon: Pierre Delong MD;  Location: [facility] ENDOSCOPY;  Service: Gastroenterology;  Laterality: N/A;  GASTRIC POLYP AND HIATAL HERNIA    EYE SURGERY Bilateral     cataracts    HERNIA REPAIR      inguinal    HYSTERECTOMY      KIDNEY STONE SURGERY      MASTECTOMY Bilateral     PACEMAKER IMPLANTATION       PT Assessment (last 12 hours)       PT Evaluation and Treatment       Row Name 02/22/24 1032          Physical Therapy Time and Intention    Subjective Information no complaints (P)   -NM     Document Type therapy note (daily note) (P)   -NM     Mode of Treatment physical therapy;individual therapy (P)   -NM     Patient Effort good (P)   -NM     Symptoms Noted During/After Treatment none (P)   -NM       Row Name 02/22/24 1032          General Information    Patient Profile Reviewed yes (P)   -NM     Patient Observations alert;cooperative;agree to therapy (P)   -NM     Barriers to Rehab none identified (P)   -NM       Row Name 02/22/24 1032          Pain    Pretreatment Pain Rating 0/10 - no pain (P)   -NM     Posttreatment Pain Rating 0/10 - no pain (P)   -NM       Row Name 02/22/24 1032          Bed Mobility    Bed Mobility bed mobility (all) activities (P)   -NM     All Activities, [place] (Bed Mobility) independent (P)   -NM       Row Name  02/22/24 1032          Transfers    Transfers sit-stand transfer;stand-sit transfer;stand pivot/stand step transfer (P)   -NM       Row Name 02/22/24 1032          Bed-Chair Transfer    Bed-Chair [place] (Transfers) standby assist (P)   -NM     Assistive Device (Bed-Chair Transfers) walker, front-wheeled (P)   -NM       Row Name 02/22/24 1032          Sit-Stand Transfer    Sit-Stand [place] (Transfers) standby assist (P)   -NM     Assistive Device (Sit-Stand Transfers) walker, front-wheeled (P)   -NM       Row Name 02/22/24 1032          Stand-Sit Transfer    Stand-Sit [place] (Transfers) standby assist (P)   -NM     Assistive Device (Stand-Sit Transfers) walker, front-wheeled (P)   -NM       Row Name 02/22/24 1032          Stand Pivot/Stand Step Transfer    Stand Pivot/Stand Step [place] (Transfers) standby assist (P)   -NM     Assistive Device (Stand Pivot Stand Step Transfer) walker, front-wheeled (P)   -NM       Row Name 02/22/24 1032          Gait/Stairs (Locomotion)    Gait/Stairs Locomotion gait/ambulation assistive device (P)   -NM     [place] Level (Gait) standby assist (P)   -NM     Assistive Device (Gait) walker, front-wheeled (P)   -NM     Patient was able to Ambulate yes (P)   -NM     Distance in Feet (Gait) 200 (P)   -NM     Pattern (Gait) 3-point;step-through (P)   -NM     Deviations/Abnormal Patterns (Gait) base of support, narrow;gait speed decreased;stride length decreased (P)   -NM       Row Name 02/22/24 1032          Safety Issues, Functional Mobility    Impairments Affecting Function (Mobility) balance;strength (P)   -NM       Row Name 02/22/24 1032          Balance    Balance Assessment standing dynamic balance (P)   -NM     Dynamic Standing Balance standby assist (P)   -NM     Position/Device Used, Standing Balance walker, front-wheeled (P)   -NM       Row Name 02/22/24 1032          Positioning and Restraints    Pre-Treatment Position in bed (P)   -NM     Post  Treatment Position bed (P)   -NM     In Bed sitting EOB;call light within reach;encouraged to call for assist;exit alarm on;with nsg (P)   -NM       Row Name 02/22/24 1032          Progress Summary (PT)    Progress Toward Functional Goals (PT) progress toward functional goals as expected (P)   -NM     Daily Progress Summary (PT) Pt was able to ambulate 200ft today and transfer safely. Pt is safe to d/c home. (P)   -NM               User Key  (r) = Recorded By, (t) = Taken By, (c) = Cosigned By      Initials Name Provider Type    Candido Elizabeth, PT Student PT Student                      PT Recommendation and Plan     Progress Summary (PT)  Progress Toward Functional Goals (PT): (P) progress toward functional goals as expected  Daily Progress Summary (PT): (P) Pt was able to ambulate 200ft today and transfer safely. Pt is safe to d/c home.   Outcome Measures       Row Name 02/22/24 1000 02/21/24 0900 02/20/24 1500       How much help from another person do you currently need...    Turning from your back to your side while in flat bed without using bedrails? 4 (P)   -NM 4  -RH 4  -NAVARRO    Moving from lying on back to sitting on the side of a flat bed without bedrails? 4 (P)   -NM 4  -RH 4  -NAVARRO    Moving to and from a bed to a chair (including a wheelchair)? 4 (P)   -NM 3  -RH 3  -NAVARRO    Standing up from a chair using your arms (e.g., wheelchair, bedside chair)? 4 (P)   -NM 3  -RH 3  -NAVARRO    Climbing 3-5 steps with a railing? 3 (P)   -NM 3  -RH 3  -NAVARRO    To walk in hospital room? 4 (P)   -NM 4  -RH 3  -NAVARRO    AM-PAC 6 Clicks Score (PT) 23 (P)   -NM 21  -RH 20  -NAVARRO    Highest Level of Mobility Goal 7 --> Walk 25 feet or more (P)   -NM 6 --> Walk 10 steps or more  -RH 6 --> Walk 10 steps or more  -NAVARRO       Functional Assessment    Outcome Measure Options -- -- AM-PAC 6 Clicks Basic Mobility (PT)  -NAVARRO              User Key  (r) = Recorded By, (t) = Taken By, (c) = Cosigned By      Initials Name Provider Type    RH  Geraldo Koenig, PTA Physical Therapist Assistant    Graham Vera, PT Physical Therapist    NM Candido Wyatt, PT Student PT Student                     Time Calculation:    PT Charges       Row Name 02/22/24 1031             Time Calculation    PT Received On 02/22/24 (P)   -NM         Timed Charges    98857 - Gait Training Minutes  8 (P)   -NM      99902 - PT Therapeutic Activity Minutes 2 (P)   -NM         Total Minutes    Timed Charges Total Minutes 10 (P)   -NM       Total Minutes 10 (P)   -NM                User Key  (r) = Recorded By, (t) = Taken By, (c) = Cosigned By      Initials Name Provider Type    Candido Elizabeth, PT Student PT Student                  Therapy Charges for Today       Code Description Service Date Service Provider Modifiers Qty    75894282512 HC GAIT TRAINING EA 15 MIN 2/22/2024 Candido Wyatt, PT Student GP 1            PT G-Codes  Outcome Measure Options: AM-PAC 6 Clicks Basic Mobility (PT)  AM-PAC 6 Clicks Score (PT): (P) 23    Candido Wyatt PT Student  2/22/2024

## 2024-02-22 NOTE — PLAN OF CARE
Goal Outcome Evaluation:Patient refused BIPAP tonight, she is on RA with no distress noted.

## 2024-02-23 LAB
QT INTERVAL: 366 MS
QTC INTERVAL: 504 MS

## 2024-02-23 NOTE — OUTREACH NOTE
Prep Survey      Flowsheet Row Responses   [facility]   Is LACE score < 7 ? No   Eligibility Readm Mgmt   Discharge diagnosis Cardiac arrhythmia   Does the patient have one of the following disease processes/diagnoses(primary or secondary)? Other   Does the patient have Home health ordered? No   Is there a DME ordered? No   Prep survey completed? Yes            ZEB HICKS - Registered Nurse

## 2024-02-23 NOTE — DISCHARGE SUMMARY
[facility]         DISCHARGE SUMMARY    Patient Name: Bella Ritter  : 1940  MRN: [id number]    Date of Admission: 2024  Date of Discharge:   Primary Care Physician: Cindy Sal MD    Consults       No orders found from 2024 to 2024.            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 female admitted to hospital for increasing shortness of breath.  Patient chest x-ray showed cardiomegaly she also had rapid heart rate and SVT/A-fib was diagnosed.  She was given additional dose of beta-blocker.    She was also given diuretics.    She was closely monitored sugars were monitored kidney functions were monitored.  Cardiologist Dr. Ghassan Jimenez was followed patient.  Echo was done which she reviewed with mild diastolic dysfunction.  Minimal valvular abnormalities were noted as dictated in echo report, please see echo for details.    Patient continued to improve she was able to get up and move around.  She was not having any shortness of breath or palpitations.  As she is doing better she was cleared and she will be discharged to home with outpatient follow-up        DISCHARGE Follow Up  Recommendations for labs and diagnostics:   Discharge to home with outpatient follow      Day of Discharge     Vital Signs:   Stable at the time of this    Physical Exam:    Elderly female not in acute distress.  Heart regular.  Lungs diminished breath sounds.  Abdomen soft.  Extremities free of edema.  Neuro nonfocal      Pertinent  and/or Most Recent Results     LAB RESULTS:      Lab 02/22/24 0430 02/21/24 0419 02/19/24 1718 02/19/24  1631   WBC 8.14 8.93  --  9.72   HEMOGLOBIN 14.2 14.4  --  14.8   HEMATOCRIT 43.8 44.1  --  45.7   PLATELETS 182 194  --  267   NEUTROS ABS 4.89  --   --  6.19   IMMATURE GRANS (ABS) 0.03  --   --  0.03   LYMPHS ABS 2.06  --   --  2.26   MONOS ABS 0.68  --   --  0.99*   EOS ABS 0.44*  --   --  0.20   MCV 91.6 91.9  --  92.9   LACTATE  --   --  1.6  --    PROTIME  --   --   --  15.8*         Lab 02/22/24  0430 02/21/24 0419 02/20/24 0422 02/20/24  0052 02/19/24 1718 02/19/24  1631   SODIUM 139 139 137  --  137  --    POTASSIUM 3.8 4.0 3.8  --  4.2  --    CHLORIDE 104 103 101  --  101  --    CO2 24.9 26.6 25.5  --  25.9  --    ANION GAP 10.1 9.4 10.5  --  10.1  --    BUN 38* 44* 33*  --  31*  --    CREATININE 1.68* 1.66* 1.62*  --  1.65*  --    EGFR 29.7* 30.1* 31.0*  --  30.3*  --    GLUCOSE 101* 133* 190*  --  156*  --    CALCIUM 9.0 8.8 9.1  --  9.0  --    MAGNESIUM  --   --   --   --  2.2  --    HEMOGLOBIN A1C  --   --   --   --   --  7.50*   TSH  --   --   --  0.522  --   --          Lab 02/22/24 0430 02/19/24 1718   TOTAL PROTEIN 6.1 6.6   ALBUMIN 3.5 3.7   GLOBULIN 2.6 2.9   ALT (SGPT) 25 32   AST (SGOT) 20 25   BILIRUBIN 0.5 0.3   ALK PHOS 79 83         Lab 02/20/24  0052 02/19/24  2248 02/19/24  1718 02/19/24  1631   PROBNP  --   --  4,981.0*  --    HSTROP T 16* 17* 17*  --    PROTIME  --   --   --  15.8*   INR  --   --   --  1.24*                 Brief Urine Lab Results  (Last result in the past 365 days)        Color   Clarity   Blood   Leuk Est   Nitrite   Protein    CREAT   Urine HCG        02/19/24 1818 Yellow   Clear   Negative   Negative   Negative   Negative                 Microbiology Results (last 10 days)       Procedure Component Value - Date/Time    Blood Culture - Blood, Arm, Left [[id number]]  (Normal) Collected: 02/19/24 1718    Lab Status: Preliminary result Specimen: Blood from Arm, Left Updated: 02/22/24 1746     Blood Culture No growth at 3 days    COVID PRE-OP / PRE-PROCEDURE SCREENING ORDER (NO ISOLATION) - Swab, Nasopharynx [[id number]]  (Normal) Collected: 02/19/24 1641    Lab Status: Final result Specimen: Swab from Nasopharynx Updated: 02/19/24 1740    Narrative:      The following orders were created for panel order COVID PRE-OP / PRE-PROCEDURE SCREENING ORDER (NO ISOLATION) - Swab, Nasopharynx.  Procedure                               Abnormality         Status                     ---------                               -----------         ------                     COVID-19, FLU A/B, RSV P...[[id number]]  Normal              Final result                 Please view results for these tests on the individual orders.    COVID-19, FLU A/B, RSV PCR 1 HR TAT - Swab, Nasopharynx [[id number]]  (Normal) Collected: 02/19/24 1641    Lab Status: Final result Specimen: Swab from Nasopharynx Updated: 02/19/24 1740     COVID19 Not Detected     Influenza A PCR Not Detected     Influenza B PCR Not Detected     RSV, PCR Not Detected    Narrative:      Fact sheet for providers: https://www.fda.gov/media/644940/download    Fact sheet for patients: https://www.fda.gov/media/099377/download    Test performed by PCR.            PROCEDURES:    XR Chest 1 View    Result Date: 2/19/2024  Impression:   1. Cardiomegaly.       CRIS ONEIL MD       Electronically Signed and Approved By: CRIS ONEIL MD on 2/19/2024 at 16:36                      Results for orders placed during the hospital encounter of 02/19/24    Adult Transthoracic Echo Complete W/ Cont if Necessary Per  Protocol    Interpretation Summary    Left ventricular wall thickness is consistent with moderate to severe concentric hypertrophy.    Left ventricular diastolic function was indeterminate.    The left atrial cavity is mildly dilated.    Moderate tricuspid valve regurgitation is present.    Estimated right ventricular systolic pressure from tricuspid regurgitation is normal (<35 mmHg).    Mild dilation of the aortic root is present. Mild dilation of the ascending aorta is present.      Labs Pending at Discharge:  Pending Labs       Order Current Status    Blood Culture - Blood, Arm, Left Preliminary result              Discharge Details        Discharge Medications        Changes to Medications        Instructions Start Date   metoprolol succinate XL 25 MG 24 hr tablet  Commonly known as: TOPROL-XL  What changed:   medication strength  how much to take  when to take this   75 mg, Oral, Every 12 Hours Scheduled             Continue These Medications        Instructions Start Date   apixaban 5 MG tablet tablet  Commonly known as: ELIQUIS   5 mg, Oral, 2 Times Daily      cetirizine 10 MG tablet  Commonly known as: zyrTEC   10 mg, Oral, Daily      fluticasone-salmeterol 230-21 MCG/ACT inhaler  Commonly known as: Advair HFA   2 puffs, Inhalation, 2 Times Daily - RT, Rinse mouth out after each use      FREESTYLE LITE test strip  Generic drug: glucose blood   No dose, route, or frequency recorded.      gabapentin 600 MG tablet  Commonly known as: NEURONTIN   600 mg, Oral, 2 Times Daily      insulin glargine 100 UNIT/ML injection  Commonly known as: LANTUS, SEMGLEE   45 Units, Subcutaneous, Nightly      levalbuterol 45 MCG/ACT inhaler  Commonly known as: XOPENEX HFA   Inhale 2 puffs Every 4 (Four) Hours As Needed.      montelukast 10 MG tablet  Commonly known as: SINGULAIR   Take 1 tablet by mouth Every Night.      NovoLOG FlexPen 100 UNIT/ML solution pen-injector sc pen  Generic drug: insulin aspart   Inject 20 Units under  the skin into the appropriate area as directed 3 (Three) Times a Day With Meals.      olmesartan 40 MG tablet  Commonly known as: BENICAR   40 mg, Oral, Daily      pantoprazole 40 MG EC tablet  Commonly known as: PROTONIX   40 mg, Oral, Daily      spironolactone-hydrochlorothiazide 25-25 MG tablet  Commonly known as: ALDACTAZIDE   1 tablet, Oral, Daily      Trulicity 3 MG/0.5ML solution pen-injector  Generic drug: Dulaglutide   Inject 0.5 mL under the skin into the appropriate area as directed 1 (One) Time Per Week. THURSDAYS             Stop These Medications      hydrALAZINE 50 MG tablet  Commonly known as: APRESOLINE              Allergies   Allergen Reactions    Aricept [Donepezil] Unknown - High Severity    Diphenhydramine Unknown - Low Severity    Lisinopril Swelling    Nsaids Unknown - High Severity and Unknown - Low Severity    Pseudoephedrine Swelling         Discharge Disposition:    Home or Self Care    Diet:    Heart healthy    Discharge Activity:     Activity Instructions       Activity as Tolerated              Future Appointments   Date Time Provider Department Center   3/4/2024  1:15 PM Daysi Pizarro APRN [facility]       Additional Instructions for the Follow-ups that You Need to Schedule       Discharge Follow-up with PCP   As directed       Currently Documented PCP:    Cindy Sal MD    PCP Phone Number:    [phone]     Follow Up Details: next week        Discharge Follow-up with Specified Provider: Dr Waller in 2 weeks   As directed      To: Dr Waller in 2 weeks                Time spent on Discharge including face to face service: 30 minutes.            I have dictated this note utilizing Dragon Dictation.             Please note that portions of this note were completed with a voice recognition program.             Part of this note may be an electronic transcription/translation of spoken language to printed text         using the Dragon Dictation System.        Electronically signed by Mustapha Duff MD, 02/23/24, 5:02 PM EST.

## 2024-02-24 LAB — BACTERIA SPEC AEROBE CULT: NORMAL

## 2024-02-25 NOTE — PROGRESS NOTES
Primary Care Provider  Cindy Sal MD     Referring Provider  No ref. provider found     Chief Complaint  Asthma, Cough, Shortness of Breath, Wheezing, and Follow-up (3 month follow up. )    Subjective          History of Presenting Illness  Patient is an 83-year-old female, patient Dr. Bowman's who presents management dyspnea who presents for follow-up visit today.  Since last office visit patient reports that she was hospitalized at [facility] in February 2024 for SVT/atrial fibrillation.  Of note, our services were not consulted during patient's hospital stay.  Patient states that she is feeling better since hospitalization and scheduled to follow-up with Dr. Ghassan Jimenez, cardiologist on 3/6/2024.  Patient states that she is scheduled to follow-up with her allergist on 3/5/2024.  Patient states that she does get short of breath that is worse with exertion, mild in severity, and improved with rest.    Patient states that she does get short of breath that is worse with exertion, moderate in severity, and improved with rest.  Patient states that she is taking Advair every day as prescribed and uses Xopenex inhaler as needed.  Patient states that she is also taking Zyrtec and Singulair as for seasonal allergies. Patient is on Protonix for reflux.  Patient states that she is using her CPAP machine when she sleeps and receives her supplies through [facility] that is managed by Dr. Randall.  Patient denies any morning headaches or excessive daytime sleepiness.  Patient denies fever, chills, night sweats, swollen glands in the head and neck, unintentional weight loss, hemoptysis, purulent sputum production, dysphagia, chest pain, palpitations, chest tightness, abdominal pain, nausea, vomiting, and diarrhea.  Patient also denies any myalgias, changes in sense of taste and/or smell, sore throat, any other coronavirus or flu-like symptoms.  Patient denies any leg swelling, orthopnea, paroxysmal  nocturnal dyspnea.  Patient is able to perform activities of daily living.        Review of Systems     Family History   Problem Relation Age of Onset    Colon polyps Father     Colon polyps Other     Malig Hyperthermia Neg Hx         Social History     Socioeconomic History    Marital status:    Tobacco Use    Smoking status: Never     Passive exposure: Past    Smokeless tobacco: Never   Vaping Use    Vaping status: Never Used   Substance and Sexual Activity    Alcohol use: Never    Drug use: Never    Sexual activity: Defer        Past Medical History:   Diagnosis Date    Asthma     Atrial fibrillation     Back pain     Breast cancer     Cancer     breast    Cirrhosis of liver not due to alcohol     Diabetes mellitus     GERD (gastroesophageal reflux disease)     Hypertension     Kidney stone     Neuropathy     Sleep apnea     CPAP        Immunization History   Administered Date(s) Administered    COVID-19 (PFIZER) Purple Cap Monovalent 02/13/2021, 03/06/2021, 09/30/2021    Covid-19 (Pfizer) Gray Cap Monovalent 05/16/2022    Flu Vaccine Split Quad 10/09/2015    Fluzone (or Fluarix & Flulaval for VFC) >6mos 10/09/2015    Fluzone High Dose =>65 Years ([employer] ONLY) 08/23/2012    Fluzone Quad >6mos (Multi-dose) 09/12/2011, 08/22/2013, 09/15/2016, 09/14/2017, 09/18/2018    Influenza Injectable Mdck Pf Quad 10/13/2020, 10/18/2021    Influenza, Unspecified 10/13/2020, 10/18/2021    PEDS-Pneumococcal Conjugate (PCV7) 06/07/2023    Pneumococcal Conjugate 13-Valent (PCV13) 03/15/2016    Pneumococcal Conjugate 20-Valent (PCV20) 06/07/2023       Allergies   Allergen Reactions    Aricept [Donepezil] Unknown - High Severity    Diphenhydramine Unknown - Low Severity    Lisinopril Swelling    Nsaids Unknown - High Severity and Unknown - Low Severity    Pseudoephedrine Swelling          Current Outpatient Medications:     apixaban (ELIQUIS) 5 MG tablet tablet, Take 1 tablet by mouth 2 (Two) Times a Day., Disp: , Rfl:      cetirizine (zyrTEC) 10 MG tablet, Take 1 tablet by mouth Daily for 90 days., Disp: 90 tablet, Rfl: 3    fluticasone-salmeterol (Advair HFA) 230-21 MCG/ACT inhaler, Inhale 2 puffs 2 (Two) Times a Day for 90 days. Rinse mouth out after each use, Disp: 3 each, Rfl: 3    gabapentin (NEURONTIN) 600 MG tablet, Take 1 tablet by mouth 2 (Two) Times a Day., Disp: , Rfl:     insulin glargine (LANTUS, SEMGLEE) 100 UNIT/ML injection, Inject 45 Units under the skin into the appropriate area as directed Every Night., Disp: , Rfl:     levalbuterol (XOPENEX HFA) 45 MCG/ACT inhaler, Inhale 2 puffs Every 4 (Four) Hours As Needed for Wheezing or Shortness of Air for up to 90 days., Disp: 3 each, Rfl: 3    metoprolol succinate XL (TOPROL-XL) 25 MG 24 hr tablet, Take 3 tablets by mouth Every 12 (Twelve) Hours for 30 days., Disp: 180 tablet, Rfl: 0    montelukast (SINGULAIR) 10 MG tablet, Take 1 tablet by mouth Every Night for 90 days., Disp: 90 tablet, Rfl: 3    NovoLOG FlexPen 100 UNIT/ML solution pen-injector sc pen, Inject 20 Units under the skin into the appropriate area as directed 3 (Three) Times a Day With Meals., Disp: , Rfl:     olmesartan (BENICAR) 40 MG tablet, Take 1 tablet by mouth Daily., Disp: , Rfl:     pantoprazole (PROTONIX) 40 MG EC tablet, Take 1 tablet by mouth Daily., Disp: , Rfl:     spironolactone-hydrochlorothiazide (ALDACTAZIDE) 25-25 MG tablet, Take 1 tablet by mouth Daily., Disp: , Rfl:     FREESTYLE LITE test strip, , Disp: , Rfl:     Trulicity 3 MG/0.5ML solution pen-injector, Inject 0.5 mL under the skin into the appropriate area as directed 1 (One) Time Per Week. THURSDAYS (Patient not taking: Reported on 3/4/2024), Disp: , Rfl:      Objective     Physical Exam  Vital Signs:   Obese, WDWN, Alert, NAD.    HEENT:  PERRL, EOMI.  OP, nares clear, no sinus tenderness  Neck:  Supple, no JVD, no thyromegaly.  Lymph: no axillary, cervical, supraclavicular lymphadenopathy noted bilaterally  Chest:  good aeration,  "clear to auscultation bilaterally, tympanic to percussion bilaterally, no work of breathing noted  CV: RRR, no MGR, pulses 2+, equal.  Abd:  Soft, NT, ND, + BS, no HSM  EXT:  no clubbing, no cyanosis, no edema, no joint tenderness  Neuro:  A&Ox3, CN grossly intact, no focal deficits.  Skin: No rashes or lesions noted.    /54 (BP Location: Left arm, Patient Position: Sitting, Cuff Size: Large Adult)   Pulse 70   Resp 16   Ht 152.4 cm (60\")   Wt 92.5 kg (204 lb)   SpO2 96% Comment: room air  BMI 39.84 kg/m²         Result Review :   I have reviewed my last office visit note.    Procedures:              Assessment and Plan      Assessment:  1.  Asthma.  Methacholine challenge test consistent with asthma.  Patient is on LABA/ICS therapy.  2. Chronic dyspnea. PFTs normal.  Methacholine challenge test consistent with asthma.   Alpha-1 with a normal genotype of M/M with level of 117.   3.  Chronic cough, improved per patient report.  4.  Peripheral eosinophilia.  5.  Obstructive sleep apnea on CPAP.  Patient is under the care of the sleep center with Dr. Randall.  6.  Elevated IgE level: 80.1.  7.  Obesity: BMI of 39.8.  8 2010 but not.  Seasonal allergies.  9. GERD.  Patient is on a PPI.  10.  Proximal atrial fibrillation: Patient is under the care of Dr. Ghassan Jimenez.  11.  Never smoker.          Plan:  1.  Continue Advair 230 mcg 2 puffs twice daily and rinse mouth out after each use.  Patient is wanting medication sent to [facility] instead of [facility].  [facility] notified to not fill medications.  Medications resent to [facility].  2.  Continue Xopenex inhaler as needed.  Asthma action plan discussed with the patient in the office today.  3.  Continue Singulair and Zyrtec Tessalon Perles and follow-up with allergist as scheduled.    4.  Follow-up with cardiologist as scheduled.  5.  For GERD,  patient to continue PPI.   Patient is also advised to sleep with the head of " the bed elevated and do not eat 3-4 hours prior to bedtime.  6.  Continue CPAP at night and with naps at current settings and clean mask and tubing daily.  Follow-up with the sleep center as scheduled.  7.  I spent counseled the patient on diet and exercise. Patient's BMI and weight were discussed.  The patient was counseled on initiating and intensifying attempts to lose weight.  Patient was counseled about the risks of obesity and advised to decrease caloric intake by 500 calories a day, eat three small meals a day and advised to minimize snacking.  I recommended 30-60 minutes of daily exercise.  8.  Vaccination status: patient reports they are up-to-date with flu, pneumonia, and Covid vaccines.  Patient is advised to continue to follow CDC recommendations such as social distancing wearing a mask and washing hands for at least 20 seconds.  9.  Smoking status: Never smoker.  10.  Patient to call the office, 911, or go to the ER with new or worsening symptoms.  11.  Follow-up with cardiologist as scheduled.  12.  Follow-up in 4 weeks, sooner if needed.          Follow Up   Return for 4-6 weeks.  Patient was given instructions and counseling regarding her condition or for health maintenance advice. Please see specific information pulled into the AVS if appropriate.

## 2024-02-26 ENCOUNTER — READMISSION MANAGEMENT (OUTPATIENT)
Dept: CALL CENTER | Facility: HOSPITAL | Age: 84
End: 2024-02-26
Payer: MEDICARE

## 2024-02-26 NOTE — OUTREACH NOTE
Medical Week 1 Survey      Flowsheet Row Responses   [facility] [place] patient discharged from? [facility]   Does the patient have one of the following disease processes/diagnoses(primary or secondary)? Other   Week 1 attempt successful? Yes   Call start time 1445   Call end time 1455   List who call center can speak with pt   Meds reviewed with patient/caregiver? Yes   Is the patient having any side effects they believe may be caused by any medication additions or changes? Yes   Side effects comments  cough   Does the patient have all medications ordered at discharge? Yes   Is the patient taking all medications as directed (includes completed medication regime)? Yes   Comments regarding appointments Pt to see cardiology and pcp in March 2024.   Does the patient have a primary care provider?  Yes   Has the patient kept scheduled appointments due by today? N/A   Psychosocial issues? No   Did the patient receive a copy of their discharge instructions? Yes   Nursing interventions Reviewed instructions with patient   What is the patient's perception of their health status since discharge? Improving   Is the patient/caregiver able to teach back signs and symptoms related to disease process for when to call PCP? Yes   Is the patient/caregiver able to teach back signs and symptoms related to disease process for when to call 911? Yes   Is the patient/caregiver able to teach back the hierarchy of who to call/visit for symptoms/problems? PCP, Specialist, Home health nurse, Urgent Care, ED, 911 Yes   Week 1 call completed? Yes   Wrap up additional comments Pt reports feeling well. Pt has had no s/s of irregular heart beat. Pt is taking toprol which is new. Pt reports cough, understanding this could be a side effect. Pt will continue to take this medication until return call from MD office. Pt understands to not d/c this medication abruptly.   Call end time 1455            Delma OTOOLE - Registered Nurse

## 2024-03-04 ENCOUNTER — OFFICE VISIT (OUTPATIENT)
Dept: PULMONOLOGY | Facility: CLINIC | Age: 84
End: 2024-03-04
Payer: MEDICARE

## 2024-03-04 VITALS
BODY MASS INDEX: 40.05 KG/M2 | OXYGEN SATURATION: 96 % | RESPIRATION RATE: 16 BRPM | WEIGHT: 204 LBS | DIASTOLIC BLOOD PRESSURE: 54 MMHG | HEIGHT: 60 IN | SYSTOLIC BLOOD PRESSURE: 114 MMHG | HEART RATE: 70 BPM

## 2024-03-04 DIAGNOSIS — R76.8 ELEVATED IGE LEVEL: ICD-10-CM

## 2024-03-04 DIAGNOSIS — G47.33 OSA ON CPAP: ICD-10-CM

## 2024-03-04 DIAGNOSIS — R05.3 CHRONIC COUGH: ICD-10-CM

## 2024-03-04 DIAGNOSIS — J45.909 ASTHMA, UNSPECIFIED ASTHMA SEVERITY, UNSPECIFIED WHETHER COMPLICATED, UNSPECIFIED WHETHER PERSISTENT: ICD-10-CM

## 2024-03-04 DIAGNOSIS — E66.9 OBESITY (BMI 30-39.9): ICD-10-CM

## 2024-03-04 DIAGNOSIS — Z78.9 NEVER SMOKED CIGARETTES: ICD-10-CM

## 2024-03-04 DIAGNOSIS — R06.09 CHRONIC DYSPNEA: ICD-10-CM

## 2024-03-04 DIAGNOSIS — I48.0 PAROXYSMAL ATRIAL FIBRILLATION: Primary | Chronic | ICD-10-CM

## 2024-03-04 PROCEDURE — 1160F RVW MEDS BY RX/DR IN RCRD: CPT | Performed by: NURSE PRACTITIONER

## 2024-03-04 PROCEDURE — 3074F SYST BP LT 130 MM HG: CPT | Performed by: NURSE PRACTITIONER

## 2024-03-04 PROCEDURE — 99214 OFFICE O/P EST MOD 30 MIN: CPT | Performed by: NURSE PRACTITIONER

## 2024-03-04 PROCEDURE — 3078F DIAST BP <80 MM HG: CPT | Performed by: NURSE PRACTITIONER

## 2024-03-04 PROCEDURE — 1159F MED LIST DOCD IN RCRD: CPT | Performed by: NURSE PRACTITIONER

## 2024-03-04 RX ORDER — MONTELUKAST SODIUM 10 MG/1
10 TABLET ORAL NIGHTLY
Qty: 90 TABLET | Refills: 3 | Status: SHIPPED | OUTPATIENT
Start: 2024-03-04 | End: 2024-06-02

## 2024-03-04 RX ORDER — MONTELUKAST SODIUM 10 MG/1
10 TABLET ORAL NIGHTLY
Qty: 90 TABLET | Refills: 3 | Status: SHIPPED | OUTPATIENT
Start: 2024-03-04 | End: 2024-03-04 | Stop reason: SDUPTHER

## 2024-03-04 RX ORDER — LEVALBUTEROL TARTRATE 45 UG/1
2 AEROSOL, METERED ORAL EVERY 4 HOURS PRN
Qty: 3 EACH | Refills: 3 | Status: SHIPPED | OUTPATIENT
Start: 2024-03-04 | End: 2024-03-04 | Stop reason: SDUPTHER

## 2024-03-04 RX ORDER — FLUTICASONE PROPIONATE AND SALMETEROL XINAFOATE 230; 21 UG/1; UG/1
2 AEROSOL, METERED RESPIRATORY (INHALATION)
Qty: 3 EACH | Refills: 3 | Status: SHIPPED | OUTPATIENT
Start: 2024-03-04 | End: 2024-03-04 | Stop reason: SDUPTHER

## 2024-03-04 RX ORDER — FLUTICASONE PROPIONATE AND SALMETEROL XINAFOATE 230; 21 UG/1; UG/1
2 AEROSOL, METERED RESPIRATORY (INHALATION)
Qty: 3 EACH | Refills: 3 | Status: SHIPPED | OUTPATIENT
Start: 2024-03-04 | End: 2024-06-02

## 2024-03-04 RX ORDER — ALBUTEROL SULFATE 90 UG/1
2 AEROSOL, METERED RESPIRATORY (INHALATION) EVERY 4 HOURS PRN
COMMUNITY
End: 2024-03-04

## 2024-03-04 RX ORDER — LEVALBUTEROL TARTRATE 45 UG/1
2 AEROSOL, METERED ORAL EVERY 4 HOURS PRN
Qty: 3 EACH | Refills: 3 | Status: SHIPPED | OUTPATIENT
Start: 2024-03-04 | End: 2024-06-02

## 2024-03-06 ENCOUNTER — READMISSION MANAGEMENT (OUTPATIENT)
Dept: CALL CENTER | Facility: HOSPITAL | Age: 84
End: 2024-03-06
Payer: MEDICARE

## 2024-03-06 NOTE — OUTREACH NOTE
Medical Week 2 Survey      Flowsheet Row Responses   [facility] patient discharged from? [facility]   Does the patient have one of the following disease processes/diagnoses(primary or secondary)? Other   Week 2 attempt successful? Yes   Call start time 1849   Discharge diagnosis Cardiac arrhythmia   Call end time 1851   Meds reviewed with patient/caregiver? Yes   Is the patient having any side effects they believe may be caused by any medication additions or changes? No   Does the patient have all medications ordered at discharge? Yes   Is the patient taking all medications as directed (includes completed medication regime)? Yes   Does the patient have a primary care provider?  Yes   Has the patient kept scheduled appointments due by today? Yes   Psychosocial issues? No   What is the patient's perception of their health status since discharge? Improving   Is the patient/caregiver able to teach back signs and symptoms related to disease process for when to call PCP? Yes   Is the patient/caregiver able to teach back signs and symptoms related to disease process for when to call 911? Yes   Is the patient/caregiver able to teach back the hierarchy of who to call/visit for symptoms/problems? PCP, Specialist, Home health nurse, Urgent Care, ED, 911 Yes   Additional teach back comments States she is doing well and had follow up with her dr and told her she was doing well.   Week 2 Call Completed? Yes   Graduated Yes   Graduated/Revoked comments Denies questions or needs at this time.   Call end time 1851            [place] LOVE - Licensed Nurse

## 2024-03-12 ENCOUNTER — TRANSCRIBE ORDERS (OUTPATIENT)
Dept: ADMINISTRATIVE | Facility: HOSPITAL | Age: 84
End: 2024-03-12
Payer: MEDICARE

## 2024-03-12 DIAGNOSIS — Z12.31 SCREENING MAMMOGRAM, ENCOUNTER FOR: Primary | ICD-10-CM

## 2024-04-02 NOTE — PROGRESS NOTES
Primary Care Provider  Cindy Sal MD     Referring Provider  No ref. provider found     Chief Complaint  Seasonal allergies; Cough; Shortness of Breath; Wheezing; Asthma, unspecified asthma severity, unspecified whether co; and Follow-up (4-6 week follow up. )    Subjective          History of Presenting Illness  Patient is an 83-year-old female, patient Dr. Bowman's who presents management dyspnea who presents for follow-up visit today.  Patient states that she was seen by the allergist and they stopped her Advair inhaler due to medication increasing her blood pressure and heart rate and started her on Incruse.  Patient states that she is going to discuss with her cardiologist whether or not she can restart her Advair. Patient states that she does get short of breath that is worse with exertion, moderate in severity, and improved with rest.  Patient states that she is taking Advair every day as prescribed and uses Xopenex inhaler as needed.  Patient states that she is also taking Zyrtec and Singulair as for seasonal allergies. Patient is on Protonix for reflux.  Patient states that she is using her CPAP machine when she sleeps and receives her supplies through [facility] that is managed by Dr. Randall.  Patient denies any morning headaches or excessive daytime sleepiness.  Patient denies fever, chills, night sweats, swollen glands in the head and neck, unintentional weight loss, hemoptysis, purulent sputum production, dysphagia, chest pain, palpitations, chest tightness, abdominal pain, nausea, vomiting, and diarrhea.  Patient also denies any myalgias, changes in sense of taste and/or smell, sore throat, any other coronavirus or flu-like symptoms.  Patient denies any leg swelling, orthopnea, paroxysmal nocturnal dyspnea.  Patient is able to perform activities of daily living.        Review of Systems     Family History   Problem Relation Age of Onset    Colon polyps Father     Colon polyps Other     Malig  Hyperthermia Neg Hx         Social History     Socioeconomic History    Marital status:    Tobacco Use    Smoking status: Never     Passive exposure: Past    Smokeless tobacco: Never   Vaping Use    Vaping status: Never Used   Substance and Sexual Activity    Alcohol use: Never    Drug use: Never    Sexual activity: Defer        Past Medical History:   Diagnosis Date    Asthma     Atrial fibrillation     Back pain     Breast cancer     Cancer     breast    Cirrhosis of liver not due to alcohol     Diabetes mellitus     GERD (gastroesophageal reflux disease)     Hypertension     Kidney stone     Neuropathy     Sleep apnea     CPAP        Immunization History   Administered Date(s) Administered    COVID-19 (PFIZER) Purple Cap Monovalent 02/13/2021, 03/06/2021, 09/30/2021    Covid-19 (Pfizer) Gray Cap Monovalent 05/16/2022    Flu Vaccine Split Quad 10/09/2015    Fluzone (or Fluarix & Flulaval for VFC) >6mos 10/09/2015    Fluzone High Dose =>65 Years (Vaxcare ONLY) 08/23/2012    Fluzone Quad >6mos (Multi-dose) 09/12/2011, 08/22/2013, 09/15/2016, 09/14/2017, 09/18/2018    Influenza Injectable Mdck Pf Quad 10/13/2020, 10/18/2021    Influenza, Unspecified 10/13/2020, 10/18/2021    PEDS-Pneumococcal Conjugate (PCV7) 06/07/2023    Pneumococcal Conjugate 13-Valent (PCV13) 03/15/2016    Pneumococcal Conjugate 20-Valent (PCV20) 06/07/2023       Allergies   Allergen Reactions    Aricept [Donepezil] Unknown - High Severity    Diphenhydramine Unknown - Low Severity    Exenatide GI Intolerance    Lisinopril Swelling    Nsaids Unknown - High Severity and Unknown - Low Severity    Pseudoephedrine Swelling          Current Outpatient Medications:     apixaban (ELIQUIS) 5 MG tablet tablet, Take 1 tablet by mouth 2 (Two) Times a Day., Disp: , Rfl:     cetirizine (zyrTEC) 10 MG tablet, Take 1 tablet by mouth Daily for 90 days., Disp: 90 tablet, Rfl: 3    ergocalciferol (ERGOCALCIFEROL) 1.25 MG (67961 UT) capsule, Take 1 capsule by  mouth 1 (One) Time Per Week., Disp: , Rfl:     gabapentin (NEURONTIN) 600 MG tablet, Take 1 tablet by mouth 2 (Two) Times a Day., Disp: , Rfl:     hydrALAZINE (APRESOLINE) 50 MG tablet, , Disp: , Rfl:     insulin glargine (LANTUS, SEMGLEE) 100 UNIT/ML injection, Inject 45 Units under the skin into the appropriate area as directed Every Night., Disp: , Rfl:     levalbuterol (XOPENEX HFA) 45 MCG/ACT inhaler, Inhale 2 puffs Every 4 (Four) Hours As Needed for Wheezing or Shortness of Air for up to 90 days., Disp: 3 each, Rfl: 3    metoprolol succinate XL (TOPROL-XL) 25 MG 24 hr tablet, Take 3 tablets by mouth Every 12 (Twelve) Hours for 30 days., Disp: 180 tablet, Rfl: 0    montelukast (SINGULAIR) 10 MG tablet, Take 1 tablet by mouth Every Night for 90 days., Disp: 90 tablet, Rfl: 3    NovoLOG FlexPen 100 UNIT/ML solution pen-injector sc pen, Inject 20 Units under the skin into the appropriate area as directed 3 (Three) Times a Day With Meals., Disp: , Rfl:     olmesartan (BENICAR) 40 MG tablet, Take 1 tablet by mouth Daily., Disp: , Rfl:     pantoprazole (PROTONIX) 40 MG EC tablet, Take 1 tablet by mouth Daily., Disp: , Rfl:     spironolactone-hydrochlorothiazide (ALDACTAZIDE) 25-25 MG tablet, Take 1 tablet by mouth Daily., Disp: , Rfl:     fluticasone-salmeterol (Advair HFA) 230-21 MCG/ACT inhaler, Inhale 2 puffs 2 (Two) Times a Day for 90 days. Rinse mouth out after each use (Patient not taking: Reported on 4/10/2024), Disp: 3 each, Rfl: 3    FREESTYLE LITE test strip, , Disp: , Rfl:     Incruse Ellipta 62.5 MCG/ACT aerosol powder , , Disp: , Rfl:     Trulicity 3 MG/0.5ML solution pen-injector, Inject 0.5 mL under the skin into the appropriate area as directed 1 (One) Time Per Week. THURSDAYS (Patient not taking: Reported on 3/4/2024), Disp: , Rfl:      Objective     Physical Exam  Vital Signs:   WDWN, Alert, NAD.    HEENT:  PERRL, EOMI.  OP, nares clear, no sinus tenderness  Neck:  Supple, no JVD, no  "thyromegaly.  Lymph: no axillary, cervical, supraclavicular lymphadenopathy noted bilaterally  Chest:  good aeration, clear to auscultation bilaterally, tympanic to percussion bilaterally, no work of breathing noted  CV: RRR, no MGR, pulses 2+, equal.  Abd:  Soft, NT, ND, + BS, no HSM  EXT:  no clubbing, no cyanosis, no edema, no joint tenderness  Neuro:  A&Ox3, CN grossly intact, no focal deficits.  Skin: No rashes or lesions noted.    /62 (BP Location: Left arm, Patient Position: Sitting, Cuff Size: Large Adult)   Pulse 93   Resp 18   Ht 152.4 cm (60\")   Wt 95.3 kg (210 lb 1.6 oz)   SpO2 99% Comment: room air  BMI 41.03 kg/m²         Result Review :   I have reviewed my last office visit note.    Procedures:            Assessment and Plan      Assessment:  1.  Asthma.  Methacholine challenge test consistent with asthma.  Patient is on LABA/ICS therapy.  2. Chronic dyspnea. PFTs normal.  Methacholine challenge test consistent with asthma.   Alpha-1 with a normal genotype of M/M with level of 117.   3.  Chronic cough, improved per patient report.  4.  Peripheral eosinophilia.  5.  Obstructive sleep apnea on CPAP.  Patient is under the care of the sleep center with Dr. Randall.  6.  Elevated IgE level: 80.1.  7.  Obesity: BMI of 39.8.  8.  Seasonal allergies.  9.  GERD.  Patient is on a PPI.  10.  Proximal atrial fibrillation: Patient is under the care of Dr. Ghassan Jimenez.  11.  Never smoker.        Plan:  1.  Patient reports that she her allergist discontinued Advair due to inhaler increasing her blood pressure and heart rate.  Patient is wanting to discuss with her cardiologist first before restarting Advair.  Patient to continue Incruse as prescribed.  2.  Continue Xopenex inhaler as needed.  Asthma action plan discussed with the patient in the office today.  3.  Continue Singulair and Zyrtec Tessalon Perles and follow-up with allergist as scheduled.    4.  Follow-up with cardiologist as scheduled.  5.  " For GERD,  patient to continue PPI.   Patient is also advised to sleep with the head of the bed elevated and do not eat 3-4 hours prior to bedtime.  6.  Continue CPAP at night and with naps at current settings and clean mask and tubing daily.  Follow-up with the sleep center as scheduled.  7.  I spent counseled the patient on diet and exercise. Patient's BMI and weight were discussed.  The patient was counseled on initiating and intensifying attempts to lose weight.  Patient was counseled about the risks of obesity and advised to decrease caloric intake by 500 calories a day, eat three small meals a day and advised to minimize snacking.  I recommended 30-60 minutes of daily exercise.  8.  Vaccination status: patient reports they are up-to-date with flu, pneumonia, and Covid vaccines.  Patient is advised to continue to follow CDC recommendations such as social distancing wearing a mask and washing hands for at least 20 seconds.  9.  Smoking status: Never smoker.  10.  Patient to call the office, 911, or go to the ER with new or worsening symptoms.  11.  Follow-up with cardiologist as scheduled.  12.  Follow-up in 6 weeks, sooner if needed.             Follow Up   Return for 6 weeks.  Patient was given instructions and counseling regarding her condition or for health maintenance advice. Please see specific information pulled into the AVS if appropriate.

## 2024-04-10 ENCOUNTER — OFFICE VISIT (OUTPATIENT)
Dept: PULMONOLOGY | Facility: CLINIC | Age: 84
End: 2024-04-10
Payer: MEDICARE

## 2024-04-10 VITALS
SYSTOLIC BLOOD PRESSURE: 129 MMHG | DIASTOLIC BLOOD PRESSURE: 62 MMHG | HEIGHT: 60 IN | HEART RATE: 93 BPM | RESPIRATION RATE: 18 BRPM | WEIGHT: 210.1 LBS | OXYGEN SATURATION: 99 % | BODY MASS INDEX: 41.25 KG/M2

## 2024-04-10 DIAGNOSIS — J30.2 SEASONAL ALLERGIES: Primary | ICD-10-CM

## 2024-04-10 DIAGNOSIS — R05.3 CHRONIC COUGH: ICD-10-CM

## 2024-04-10 DIAGNOSIS — G47.33 OSA ON CPAP: ICD-10-CM

## 2024-04-10 DIAGNOSIS — R06.09 CHRONIC DYSPNEA: ICD-10-CM

## 2024-04-10 DIAGNOSIS — J45.909 ASTHMA, UNSPECIFIED ASTHMA SEVERITY, UNSPECIFIED WHETHER COMPLICATED, UNSPECIFIED WHETHER PERSISTENT: ICD-10-CM

## 2024-04-10 DIAGNOSIS — D72.19 PERIPHERAL EOSINOPHILIA: ICD-10-CM

## 2024-04-10 PROCEDURE — 1160F RVW MEDS BY RX/DR IN RCRD: CPT | Performed by: NURSE PRACTITIONER

## 2024-04-10 PROCEDURE — 1159F MED LIST DOCD IN RCRD: CPT | Performed by: NURSE PRACTITIONER

## 2024-04-10 PROCEDURE — 99214 OFFICE O/P EST MOD 30 MIN: CPT | Performed by: NURSE PRACTITIONER

## 2024-04-10 PROCEDURE — 3074F SYST BP LT 130 MM HG: CPT | Performed by: NURSE PRACTITIONER

## 2024-04-10 PROCEDURE — 3078F DIAST BP <80 MM HG: CPT | Performed by: NURSE PRACTITIONER

## 2024-04-10 RX ORDER — UMECLIDINIUM 62.5 UG/1
AEROSOL, POWDER ORAL
COMMUNITY
Start: 2024-04-01

## 2024-04-10 RX ORDER — ERGOCALCIFEROL 1.25 MG/1
1 CAPSULE ORAL WEEKLY
COMMUNITY
Start: 2023-12-05 | End: 2024-12-04

## 2024-04-10 RX ORDER — HYDRALAZINE HYDROCHLORIDE 50 MG/1
TABLET, FILM COATED ORAL
COMMUNITY

## 2024-04-18 ENCOUNTER — HOSPITAL ENCOUNTER (OUTPATIENT)
Dept: MAMMOGRAPHY | Facility: HOSPITAL | Age: 84
Discharge: HOME | End: 2024-04-18
Admitting: INTERNAL MEDICINE
Payer: MEDICARE

## 2024-04-18 DIAGNOSIS — Z12.31 SCREENING MAMMOGRAM, ENCOUNTER FOR: ICD-10-CM

## 2024-04-18 PROCEDURE — 77063 BREAST TOMOSYNTHESIS BI: CPT

## 2024-04-18 PROCEDURE — 77067 SCR MAMMO BI INCL CAD: CPT

## 2024-05-20 NOTE — PROGRESS NOTES
Primary Care Provider  Cindy Sal MD     Referring Provider  No ref. provider found     Chief Complaint  Seasonal allergies, Asthma, and Follow-up (6 week follow up. )    Subjective          History of Presenting Illness  Patient is an 83-year-old female, patient Dr. Bowman's who presents management dyspnea who presents for follow-up visit today.  Patient states that she does get short of breath that is worse with exertion, moderate in severity, and improved with rest.  Patient states that she is taking Advair and Incruse every day as prescribed and uses Xopenex inhaler as needed.  Patient states that she is also taking Zyrtec and Singulair as for seasonal allergies. Patient is on Protonix for reflux.  Patient states that she is using her CPAP machine when she sleeps and receives her supplies through [facility] that is managed by Dr. Randall.  Patient denies any morning headaches or excessive daytime sleepiness.  Patient denies fever, chills, night sweats, swollen glands in the head and neck, unintentional weight loss, hemoptysis, purulent sputum production, dysphagia, chest pain, palpitations, chest tightness, abdominal pain, nausea, vomiting, and diarrhea.  Patient also denies any myalgias, changes in sense of taste and/or smell, sore throat, any other coronavirus or flu-like symptoms.  Patient denies any leg swelling, orthopnea, paroxysmal nocturnal dyspnea.  Patient is able to perform activities of daily living.        Review of Systems     Family History   Problem Relation Age of Onset    Colon polyps Father     Colon polyps Other     Malig Hyperthermia Neg Hx         Social History     Socioeconomic History    Marital status:    Tobacco Use    Smoking status: Never     Passive exposure: Past    Smokeless tobacco: Never   Vaping Use    Vaping status: Never Used   Substance and Sexual Activity    Alcohol use: Never    Drug use: Never    Sexual activity: Defer        Past Medical History:    Diagnosis Date    Asthma     Atrial fibrillation     Back pain     Breast cancer     Cancer     breast    Cirrhosis of liver not due to alcohol     Diabetes mellitus     GERD (gastroesophageal reflux disease)     Hypertension     Kidney stone     Neuropathy     Sleep apnea     CPAP        Immunization History   Administered Date(s) Administered    COVID-19 (PFIZER) Purple Cap Monovalent 02/13/2021, 03/06/2021, 09/30/2021    Covid-19 (Pfizer) Gray Cap Monovalent 05/16/2022    Flu Vaccine Split Quad 10/09/2015    Fluzone (or Fluarix & Flulaval for VFC) >6mos 10/09/2015    Fluzone High Dose =>65 Years ([employer] ONLY) 08/23/2012    Fluzone Quad >6mos (Multi-dose) 09/12/2011, 08/22/2013, 09/15/2016, 09/14/2017, 09/18/2018    Influenza Injectable Mdck Pf Quad 10/13/2020, 10/18/2021    Influenza, Unspecified 10/13/2020, 10/18/2021    PEDS-Pneumococcal Conjugate (PCV7) 06/07/2023    Pneumococcal Conjugate 13-Valent (PCV13) 03/15/2016    Pneumococcal Conjugate 20-Valent (PCV20) 06/07/2023       Allergies   Allergen Reactions    Aricept [Donepezil] Unknown - High Severity    Diphenhydramine Unknown - Low Severity    Exenatide GI Intolerance    Lisinopril Swelling    Nsaids Unknown - High Severity and Unknown - Low Severity    Pseudoephedrine Swelling          Current Outpatient Medications:     apixaban (ELIQUIS) 5 MG tablet tablet, Take 1 tablet by mouth 2 (Two) Times a Day., Disp: , Rfl:     cetirizine (zyrTEC) 10 MG tablet, Take 1 tablet by mouth Daily for 90 days., Disp: 90 tablet, Rfl: 3    fluticasone-salmeterol (Advair HFA) 230-21 MCG/ACT inhaler, Inhale 2 puffs 2 (Two) Times a Day for 90 days. Rinse mouth out after each use, Disp: 3 each, Rfl: 3    gabapentin (NEURONTIN) 600 MG tablet, Take 1 tablet by mouth 2 (Two) Times a Day., Disp: , Rfl:     hydrALAZINE (APRESOLINE) 50 MG tablet, , Disp: , Rfl:     Incruse Ellipta 62.5 MCG/ACT aerosol powder , Inhale 1 puff Daily for 90 days., Disp: 3 each, Rfl: 3     levalbuterol (XOPENEX HFA) 45 MCG/ACT inhaler, Inhale 2 puffs Every 4 (Four) Hours As Needed for Wheezing or Shortness of Air for up to 90 days., Disp: 3 each, Rfl: 3    metoprolol succinate XL (TOPROL-XL) 25 MG 24 hr tablet, Take 3 tablets by mouth Every 12 (Twelve) Hours for 30 days., Disp: 180 tablet, Rfl: 0    montelukast (SINGULAIR) 10 MG tablet, Take 1 tablet by mouth Every Night for 90 days., Disp: 90 tablet, Rfl: 3    NovoLOG FlexPen 100 UNIT/ML solution pen-injector sc pen, Inject 20 Units under the skin into the appropriate area as directed 3 (Three) Times a Day With Meals., Disp: , Rfl:     olmesartan (BENICAR) 40 MG tablet, Take 1 tablet by mouth Daily., Disp: , Rfl:     pantoprazole (PROTONIX) 40 MG EC tablet, Take 1 tablet by mouth Daily., Disp: , Rfl:     spironolactone-hydrochlorothiazide (ALDACTAZIDE) 25-25 MG tablet, Take 1 tablet by mouth Daily., Disp: , Rfl:     Trulicity 3 MG/0.5ML solution pen-injector, Inject 0.5 mL under the skin into the appropriate area as directed 1 (One) Time Per Week. THURSDAYS, Disp: , Rfl:     ergocalciferol (ERGOCALCIFEROL) 1.25 MG (78330 UT) capsule, Take 1 capsule by mouth 1 (One) Time Per Week. (Patient not taking: Reported on 5/22/2024), Disp: , Rfl:     FREESTYLE LITE test strip, , Disp: , Rfl:     insulin glargine (LANTUS, SEMGLEE) 100 UNIT/ML injection, Inject 45 Units under the skin into the appropriate area as directed Every Night., Disp: , Rfl:      Objective     Physical Exam  Vital Signs:   WDWN, Alert, NAD.    HEENT:  PERRL, EOMI.  OP, nares clear, no sinus tenderness  Neck:  Supple, no JVD, no thyromegaly.  Lymph: no axillary, cervical, supraclavicular lymphadenopathy noted bilaterally  Chest:  good aeration, clear to auscultation bilaterally, tympanic to percussion bilaterally, no work of breathing noted  CV: RRR, no MGR, pulses 2+, equal.  Abd:  Soft, NT, ND, + BS, no HSM  EXT:  no clubbing, no cyanosis, no edema, no joint tenderness  Neuro:  A&Ox3, CN  "grossly intact, no focal deficits.  Skin: No rashes or lesions noted.    /73 (BP Location: Left arm, Patient Position: Sitting, Cuff Size: Large Adult)   Pulse 74   Resp 18   Ht 152.4 cm (60\")   Wt 94 kg (207 lb 4.8 oz)   SpO2 97% Comment: room air  BMI 40.49 kg/m²         Result Review :   I have reviewed my last office visit note.    Procedures:                   Assessment and Plan      Assessment:  1.  Asthma.  Methacholine challenge test consistent with asthma.  Patient is on LABA/ICS therapy.  2. Chronic dyspnea. PFTs normal.  Methacholine challenge test consistent with asthma.   Alpha-1 with a normal genotype of M/M with level of 117.   3.  Intermittent cough.  4.  Peripheral eosinophilia.  5.  Obstructive sleep apnea on CPAP.  Patient is under the care of the sleep center with Dr. Randall.  6.  Elevated IgE level: 80.1.  7.  Seasonal allergies.  8.  GERD.  Patient is on a PPI.  9.  Paroxysmal atrial fibrillation: Patient is under the care of Dr. Ghassan Jimenez.  10.  Never smoker.        Plan:  1.  Patient reports that she her allergist discontinued Advair due to inhaler increasing her blood pressure and heart rate.  Patient is wanting to discuss with her cardiologist first before restarting Advair.  Patient to continue Incruse as prescribed.  2.  Continue Xopenex inhaler as needed.  Asthma action plan discussed with the patient in the office today.  3.  Continue Singulair and Zyrtec Tessalon Perles and follow-up with allergist as scheduled.    4.  Follow-up with cardiologist as scheduled.  5.  For GERD,  patient to continue PPI.   Patient is also advised to sleep with the head of the bed elevated and do not eat 3-4 hours prior to bedtime.  6.  Continue CPAP at night and with naps at current settings and clean mask and tubing daily.  Follow-up with the sleep center as scheduled.  7.  Vaccination status: patient reports they are up-to-date with flu, pneumonia, and Covid vaccines.  Patient is advised " to continue to follow CDC recommendations such as social distancing wearing a mask and washing hands for at least 20 seconds.  8.  Smoking status: Never smoker.  9.  Patient to call the office, 911, or go to the ER with new or worsening symptoms.  10.  Follow-up with cardiologist as scheduled.  11.  Follow-up in 4 months, sooner if needed.          Follow Up   Return in about 4 months (around 9/22/2024).  Patient was given instructions and counseling regarding her condition or for health maintenance advice. Please see specific information pulled into the AVS if appropriate.

## 2024-05-20 NOTE — PATIENT INSTRUCTIONS
Asthma, Adult    Asthma is a condition that causes swelling and narrowing of the airways. These are the passages that lead from the nose and mouth down into the lungs. When asthma symptoms get worse it is called an asthma attack or flare. This can make it hard to breathe. Asthma flares can range from minor to life-threatening. There is no cure for asthma, but medicines and lifestyle changes can help to control it.  What are the causes?  It is not known exactly what causes asthma, but certain things can cause asthma symptoms to get worse (triggers).  What can trigger an asthma attack?  Cigarette smoke.  Mold.  Dust.  Your pet's skin flakes (dander).  Cockroaches.  Pollen.  Air pollution (like household , wood smoke, smog, or chemical odors).  What are the signs or symptoms?  Trouble breathing (shortness of breath).  Coughing.  Making high-pitched whistling sounds when you breathe, most often when you breathe out (wheezing).  Chest tightness.  Tiredness with little activity.  Poor exercise tolerance.  How is this treated?  Controller medicines that help prevent asthma symptoms.  Fast-acting reliever or rescue medicines. These give short-term relief of asthma symptoms.  Allergy medicines if your attacks are brought on by allergens.  Medicines to help control the body's defense (immune) system.  Staying away from the things that cause asthma attacks.  Follow these instructions at home:  Avoiding triggers in your home  Do not allow anyone to smoke in your home.  Limit use of fireplaces and wood stoves.  Get rid of pests (such as roaches and mice) and their droppings.  Keep your home clean.  Clean your floors. Dust regularly. Use cleaning products that do not smell.  Wash bed sheets and blankets every week in hot water. Dry them in a dryer.  Have someone vacuum when you are not home.  Change your heating and air conditioning filters often.  Use blankets that are made of polyester or cotton.  General  instructions  Take over-the-counter and prescription medicines only as told by your doctor.  Do not smoke or use any products that contain nicotine or tobacco. If you need help quitting, ask your doctor.  Stay away from secondhand smoke.  Avoid doing things outdoors when allergen counts are high and when air quality is low.  Warm up before you exercise. Take time to cool down after exercise.  Use a peak flow meter as told by your doctor. A peak flow meter is a tool that measures how well your lungs are working.  Keep track of the peak flow meter's readings. Write them down.  Follow your asthma action plan. This is a written plan for taking care of your asthma and treating your attacks.  Make sure you get all the shots (vaccines) that your doctor recommends. Ask your doctor about a flu shot and a pneumonia shot.  Keep all follow-up visits.  Contact a doctor if:  You have wheezing, shortness of breath, or a cough even while taking medicine to prevent attacks.  The mucus you cough up (sputum) is thicker than usual.  The mucus you cough up changes from clear or white to yellow, green, gray, or is bloody.  You have problems from the medicine you are taking, such as:  A rash.  Itching.  Swelling.  Trouble breathing.  You need reliever medicines more than 2-3 times a week.  Your peak flow reading is still at 50-79% of your personal best after following the action plan for 1 hour.  You have a fever.  Get help right away if:  You seem to be worse and are not responding to medicine during an asthma attack.  You are short of breath even at rest.  You get short of breath when doing very little activity.  You have trouble eating, drinking, or talking.  You have chest pain or tightness.  You have a fast heartbeat.  Your lips or fingernails start to turn blue.  You are light-headed or dizzy, or you faint.  Your peak flow is less than 50% of your personal best.  You feel too tired to breathe normally.  These symptoms may be an  emergency. Get help right away. Call 911.  Do not wait to see if the symptoms will go away.  Do not drive yourself to the hospital.  Summary  Asthma is a long-term (chronic) condition in which the airways get tight and narrow. An asthma attack can make it hard to breathe.  Asthma cannot be cured, but medicines and lifestyle changes can help control it.  Make sure you understand how to avoid triggers and how and when to use your medicines.  Avoid things that can cause allergy symptoms (allergens). These include animal skin flakes (dander) and pollen from trees or grass.  Avoid things that pollute the air. These may include household , wood smoke, smog, or chemical odors.  This information is not intended to replace advice given to you by your health care provider. Make sure you discuss any questions you have with your health care provider.  Document Revised: 09/26/2022 Document Reviewed: 09/26/2022  [employer]vier Patient Education © 2024 [employer]

## 2024-05-22 ENCOUNTER — OFFICE VISIT (OUTPATIENT)
Dept: PULMONOLOGY | Facility: CLINIC | Age: 84
End: 2024-05-22
Payer: MEDICARE

## 2024-05-22 VITALS
HEIGHT: 60 IN | OXYGEN SATURATION: 97 % | DIASTOLIC BLOOD PRESSURE: 73 MMHG | BODY MASS INDEX: 40.7 KG/M2 | SYSTOLIC BLOOD PRESSURE: 129 MMHG | HEART RATE: 74 BPM | RESPIRATION RATE: 18 BRPM | WEIGHT: 207.3 LBS

## 2024-05-22 DIAGNOSIS — K21.9 GASTROESOPHAGEAL REFLUX DISEASE, UNSPECIFIED WHETHER ESOPHAGITIS PRESENT: ICD-10-CM

## 2024-05-22 DIAGNOSIS — R06.09 CHRONIC DYSPNEA: ICD-10-CM

## 2024-05-22 DIAGNOSIS — Z78.9 NEVER SMOKED CIGARETTES: ICD-10-CM

## 2024-05-22 DIAGNOSIS — D72.19 PERIPHERAL EOSINOPHILIA: ICD-10-CM

## 2024-05-22 DIAGNOSIS — J30.2 SEASONAL ALLERGIES: Primary | ICD-10-CM

## 2024-05-22 DIAGNOSIS — J45.909 ASTHMA, UNSPECIFIED ASTHMA SEVERITY, UNSPECIFIED WHETHER COMPLICATED, UNSPECIFIED WHETHER PERSISTENT: ICD-10-CM

## 2024-05-22 DIAGNOSIS — G47.33 OSA ON CPAP: ICD-10-CM

## 2024-05-22 DIAGNOSIS — R05.3 CHRONIC COUGH: ICD-10-CM

## 2024-05-22 DIAGNOSIS — R76.8 ELEVATED IGE LEVEL: ICD-10-CM

## 2024-05-22 PROCEDURE — 3078F DIAST BP <80 MM HG: CPT | Performed by: NURSE PRACTITIONER

## 2024-05-22 PROCEDURE — 1160F RVW MEDS BY RX/DR IN RCRD: CPT | Performed by: NURSE PRACTITIONER

## 2024-05-22 PROCEDURE — 99214 OFFICE O/P EST MOD 30 MIN: CPT | Performed by: NURSE PRACTITIONER

## 2024-05-22 PROCEDURE — 3074F SYST BP LT 130 MM HG: CPT | Performed by: NURSE PRACTITIONER

## 2024-05-22 PROCEDURE — 1159F MED LIST DOCD IN RCRD: CPT | Performed by: NURSE PRACTITIONER

## 2024-05-22 RX ORDER — LEVALBUTEROL TARTRATE 45 UG/1
2 AEROSOL, METERED ORAL EVERY 4 HOURS PRN
Qty: 3 EACH | Refills: 3 | Status: SHIPPED | OUTPATIENT
Start: 2024-05-22 | End: 2024-08-20

## 2024-05-22 RX ORDER — UMECLIDINIUM 62.5 UG/1
1 AEROSOL, POWDER ORAL
Qty: 3 EACH | Refills: 3 | Status: SHIPPED | OUTPATIENT
Start: 2024-05-22 | End: 2024-08-20

## 2024-05-22 RX ORDER — FLUTICASONE PROPIONATE AND SALMETEROL XINAFOATE 230; 21 UG/1; UG/1
2 AEROSOL, METERED RESPIRATORY (INHALATION)
Qty: 3 EACH | Refills: 3 | Status: SHIPPED | OUTPATIENT
Start: 2024-05-22 | End: 2024-08-20

## 2024-05-31 ENCOUNTER — LAB (OUTPATIENT)
Dept: LAB | Facility: HOSPITAL | Age: 84
End: 2024-05-31
Payer: MEDICARE

## 2024-05-31 ENCOUNTER — TRANSCRIBE ORDERS (OUTPATIENT)
Dept: ADMINISTRATIVE | Facility: HOSPITAL | Age: 84
End: 2024-05-31
Payer: MEDICARE

## 2024-05-31 DIAGNOSIS — I10 ESSENTIAL HYPERTENSION, MALIGNANT: ICD-10-CM

## 2024-05-31 DIAGNOSIS — N18.32 CHRONIC KIDNEY DISEASE (CKD) STAGE G3B/A1, MODERATELY DECREASED GLOMERULAR FILTRATION RATE (GFR) BETWEEN 30-44 ML/MIN/1.73 SQUARE METER AND ALBUMINURIA CREATININE RATIO LESS THAN 30 MG/G (CMS/H*: Primary | ICD-10-CM

## 2024-05-31 DIAGNOSIS — E55.9 VITAMIN D DEFICIENCY: ICD-10-CM

## 2024-05-31 DIAGNOSIS — N18.32 CHRONIC KIDNEY DISEASE (CKD) STAGE G3B/A1, MODERATELY DECREASED GLOMERULAR FILTRATION RATE (GFR) BETWEEN 30-44 ML/MIN/1.73 SQUARE METER AND ALBUMINURIA CREATININE RATIO LESS THAN 30 MG/G (CMS/H*: ICD-10-CM

## 2024-05-31 LAB
25(OH)D3 SERPL-MCNC: 41 NG/ML (ref 30–100)
ALBUMIN SERPL-MCNC: 3.9 G/DL (ref 3.5–5.2)
ANION GAP SERPL CALCULATED.3IONS-SCNC: 6 MMOL/L (ref 5–15)
BACTERIA UR QL AUTO: ABNORMAL /HPF
BASOPHILS # BLD AUTO: 0.04 10*3/MM3 (ref 0–0.2)
BASOPHILS NFR BLD AUTO: 0.8 % (ref 0–1.5)
BILIRUB UR QL STRIP: NEGATIVE
BUN SERPL-MCNC: 20 MG/DL (ref 8–23)
BUN/CREAT SERPL: 15 (ref 7–25)
CALCIUM SPEC-SCNC: 8.9 MG/DL (ref 8.6–10.5)
CHLORIDE SERPL-SCNC: 105 MMOL/L (ref 98–107)
CLARITY UR: CLEAR
CO2 SERPL-SCNC: 28 MMOL/L (ref 22–29)
COLOR UR: YELLOW
CREAT SERPL-MCNC: 1.33 MG/DL (ref 0.57–1)
CREAT UR-MCNC: 75.4 MG/DL
DEPRECATED RDW RBC AUTO: 40.9 FL (ref 37–54)
EGFRCR SERPLBLD CKD-EPI 2021: 39.3 ML/MIN/1.73
EOSINOPHIL # BLD AUTO: 0.33 10*3/MM3 (ref 0–0.4)
EOSINOPHIL NFR BLD AUTO: 6.6 % (ref 0.3–6.2)
ERYTHROCYTE [DISTWIDTH] IN BLOOD BY AUTOMATED COUNT: 12.5 % (ref 12.3–15.4)
GLUCOSE SERPL-MCNC: 218 MG/DL (ref 65–99)
GLUCOSE UR STRIP-MCNC: NEGATIVE MG/DL
HCT VFR BLD AUTO: 42.2 % (ref 34–46.6)
HGB BLD-MCNC: 13.8 G/DL (ref 12–15.9)
HGB UR QL STRIP.AUTO: NEGATIVE
HYALINE CASTS UR QL AUTO: ABNORMAL /LPF
IMM GRANULOCYTES # BLD AUTO: 0.02 10*3/MM3 (ref 0–0.05)
IMM GRANULOCYTES NFR BLD AUTO: 0.4 % (ref 0–0.5)
KETONES UR QL STRIP: NEGATIVE
LEUKOCYTE ESTERASE UR QL STRIP.AUTO: ABNORMAL
LYMPHOCYTES # BLD AUTO: 1.19 10*3/MM3 (ref 0.7–3.1)
LYMPHOCYTES NFR BLD AUTO: 23.7 % (ref 19.6–45.3)
MCH RBC QN AUTO: 29.6 PG (ref 26.6–33)
MCHC RBC AUTO-ENTMCNC: 32.7 G/DL (ref 31.5–35.7)
MCV RBC AUTO: 90.6 FL (ref 79–97)
MONOCYTES # BLD AUTO: 0.46 10*3/MM3 (ref 0.1–0.9)
MONOCYTES NFR BLD AUTO: 9.1 % (ref 5–12)
NEUTROPHILS NFR BLD AUTO: 2.99 10*3/MM3 (ref 1.7–7)
NEUTROPHILS NFR BLD AUTO: 59.4 % (ref 42.7–76)
NITRITE UR QL STRIP: NEGATIVE
NRBC BLD AUTO-RTO: 0 /100 WBC (ref 0–0.2)
PH UR STRIP.AUTO: 6 [PH] (ref 5–8)
PHOSPHATE SERPL-MCNC: 2.9 MG/DL (ref 2.5–4.5)
PLATELET # BLD AUTO: 161 10*3/MM3 (ref 140–450)
PMV BLD AUTO: 10.5 FL (ref 6–12)
POTASSIUM SERPL-SCNC: 3.9 MMOL/L (ref 3.5–5.2)
PROT ?TM UR-MCNC: 21.1 MG/DL
PROT UR QL STRIP: ABNORMAL
PROT/CREAT UR: 0.28 MG/G{CREAT}
PTH-INTACT SERPL-MCNC: 47.3 PG/ML (ref 15–65)
RBC # BLD AUTO: 4.66 10*6/MM3 (ref 3.77–5.28)
RBC # UR STRIP: ABNORMAL /HPF
REF LAB TEST METHOD: ABNORMAL
SODIUM SERPL-SCNC: 139 MMOL/L (ref 136–145)
SP GR UR STRIP: 1.01 (ref 1–1.03)
SQUAMOUS #/AREA URNS HPF: ABNORMAL /HPF
UROBILINOGEN UR QL STRIP: ABNORMAL
WBC # UR STRIP: ABNORMAL /HPF
WBC NRBC COR # BLD AUTO: 5.03 10*3/MM3 (ref 3.4–10.8)

## 2024-05-31 PROCEDURE — 82570 ASSAY OF URINE CREATININE: CPT

## 2024-05-31 PROCEDURE — 82306 VITAMIN D 25 HYDROXY: CPT

## 2024-05-31 PROCEDURE — 81001 URINALYSIS AUTO W/SCOPE: CPT

## 2024-05-31 PROCEDURE — 85025 COMPLETE CBC W/AUTO DIFF WBC: CPT

## 2024-05-31 PROCEDURE — 83970 ASSAY OF PARATHORMONE: CPT

## 2024-05-31 PROCEDURE — 36415 COLL VENOUS BLD VENIPUNCTURE: CPT

## 2024-05-31 PROCEDURE — 80069 RENAL FUNCTION PANEL: CPT

## 2024-05-31 PROCEDURE — 84156 ASSAY OF PROTEIN URINE: CPT

## 2024-06-09 ENCOUNTER — HOSPITAL ENCOUNTER (EMERGENCY)
Facility: HOSPITAL | Age: 84
Discharge: HOME | End: 2024-06-09
Attending: EMERGENCY MEDICINE | Admitting: EMERGENCY MEDICINE
Payer: MEDICARE

## 2024-06-09 VITALS
RESPIRATION RATE: 16 BRPM | DIASTOLIC BLOOD PRESSURE: 82 MMHG | TEMPERATURE: 97.7 F | SYSTOLIC BLOOD PRESSURE: 158 MMHG | BODY MASS INDEX: 41.42 KG/M2 | HEIGHT: 61 IN | WEIGHT: 219.36 LBS | HEART RATE: 66 BPM | OXYGEN SATURATION: 97 %

## 2024-06-09 DIAGNOSIS — R04.0 EPISTAXIS: Primary | ICD-10-CM

## 2024-06-09 PROCEDURE — 99283 EMERGENCY DEPT VISIT LOW MDM: CPT

## 2024-06-09 NOTE — DISCHARGE INSTRUCTIONS
Please continue your Eliquis for your A-fib  If you have another nosebleed please use the nose clip and pinch for 15 to 20 minutes    Follow-up with your PCP.

## 2024-06-09 NOTE — ED PROVIDER NOTES
Time: 2:36 PM EDT  Date of encounter:  2024  Independent Historian/Clinical History and Information was obtained by:   Patient    History is limited by: N/A    Chief Complaint   Patient presents with    Nose Bleed     Pt arrived via EMS with c/o nosebleed on and off x3 days. Pt on blood thinners.         History of Present Illness:  Patient is a 83 y.o. year old female who presents to the emergency department for evaluation of epistaxis  intermittently x 3 days.  Patient states today it started around noon and lasted for about 20 minutes today.  Then it started again.  Patient states she is on a blood thinner for A-fib.  Denies fall or injury to her face.  Denies recently blowing her nose.    Patient Care Team  Primary Care Provider: Cindy Sal MD    Past Medical History:     Allergies   Allergen Reactions    Aricept [Donepezil] Unknown - High Severity    Diphenhydramine Unknown - Low Severity    Exenatide GI Intolerance    Lisinopril Swelling    Nsaids Unknown - High Severity and Unknown - Low Severity    Pseudoephedrine Swelling     Past Medical History:   Diagnosis Date    Asthma     Atrial fibrillation     Atrial fibrillation     Back pain     Breast cancer     Cancer     breast    CHF (congestive heart failure)     Cirrhosis of liver not due to alcohol     Diabetes mellitus     GERD (gastroesophageal reflux disease)     Hypertension     Kidney stone     Neuropathy     Sleep apnea     CPAP     Past Surgical History:   Procedure Laterality Date     SECTION      CHOLECYSTECTOMY      COLONOSCOPY N/A 2022    Procedure: COLONOSCOPY WITH HOT SNARE PIECE MEAL POLYPECTOMY, ORISE INJECTION;  Surgeon: Pierre Delong MD;  Location: [facility] ENDOSCOPY;  Service: Gastroenterology;  Laterality: N/A;  COLON POLYPS    COLONOSCOPY N/A 2022    Procedure: COLONOSCOPY WITH POLYPECTOMY, CAUTERY;  Surgeon: Pierre Delong MD;  Location: [facility] ENDOSCOPY;  Service: Gastroenterology;  Laterality:  N/A;  COLON POLYP    COLONOSCOPY N/A 01/31/2023    Procedure: COLONOSCOPY WITH HOT SNARE POLYPECTOMY;  Surgeon: Pierre Delong MD;  Location: [facility] ENDOSCOPY;  Service: Gastroenterology;  Laterality: N/A;  HEMORRHOID, COLON POLYP    ENDOSCOPY N/A 02/08/2022    Procedure: ESOPHAGOGASTRODUODENOSCOPY WITH HOT SNARE POLYPECTOMY, BIOPSIES, ORISE INJECTION AND CLIP APPLICATION X1;  Surgeon: Pierre Delong MD;  Location: [facility] ENDOSCOPY;  Service: Gastroenterology;  Laterality: N/A;  GASTRIC POLYP AND HIATAL HERNIA    EYE SURGERY Bilateral     cataracts    HERNIA REPAIR      inguinal    HYSTERECTOMY      KIDNEY STONE SURGERY      MASTECTOMY Bilateral     PACEMAKER IMPLANTATION       Family History   Problem Relation Age of Onset    Colon polyps Father     Colon polyps Other     Malig Hyperthermia Neg Hx        Home Medications:  Prior to Admission medications    Medication Sig Start Date End Date Taking? Authorizing Provider   apixaban (ELIQUIS) 5 MG tablet tablet Take 1 tablet by mouth 2 (Two) Times a Day.   Yes Emergency, Nurse Epic, RN   cetirizine (zyrTEC) 10 MG tablet Take 1 tablet by mouth Daily for 90 days. 8/10/23 5/22/24  Daysi Pizarro APRN   fluticasone-salmeterol (Advair HFA) 230-21 MCG/ACT inhaler Inhale 2 puffs 2 (Two) Times a Day for 90 days. Rinse mouth out after each use 5/22/24 8/20/24  Daysi Pizarro APRN   FREESTYLE LITE test strip  8/12/22   Sherie Blandon MD   gabapentin (NEURONTIN) 600 MG tablet Take 1 tablet by mouth 2 (Two) Times a Day.    ProviderSherie MD   hydrALAZINE (APRESOLINE) 50 MG tablet     ProviderSherie MD   Incruse Ellipta 62.5 MCG/ACT aerosol powder  Inhale 1 puff Daily for 90 days. 5/22/24 8/20/24  Daysi Pizarro APRN   insulin glargine (LANTUS, SEMGLEE) 100 UNIT/ML injection Inject 45 Units under the skin into the appropriate area as directed Every Night.    Sherie Blandon MD   levalbuterol (XOPENEX HFA) 45 MCG/ACT inhaler Inhale  2 puffs Every 4 (Four) Hours As Needed for Wheezing or Shortness of Air for up to 90 days. 5/22/24 8/20/24  Daysi Pizarro APRN   metoprolol succinate XL (TOPROL-XL) 25 MG 24 hr tablet Take 3 tablets by mouth Every 12 (Twelve) Hours for 30 days. 2/22/24 5/22/24  Mustapha Duff MD   montelukast (SINGULAIR) 10 MG tablet Take 1 tablet by mouth Every Night for 90 days. 3/4/24 6/2/24  Daysi Pizarro APRN   NovoLOG FlexPen 100 UNIT/ML solution pen-injector sc pen Inject 20 Units under the skin into the appropriate area as directed 3 (Three) Times a Day With Meals. 11/7/23   Sherie Blandon MD   olmesartan (BENICAR) 40 MG tablet Take 1 tablet by mouth Daily.    Emergency, Nurse Epic, RN   pantoprazole (PROTONIX) 40 MG EC tablet Take 1 tablet by mouth Daily.    Sherie Blandon MD   spironolactone-hydrochlorothiazide (ALDACTAZIDE) 25-25 MG tablet Take 1 tablet by mouth Daily. 10/11/21   Sherie Blandon MD   Trulicity 3 MG/0.5ML solution pen-injector Inject 0.5 mL under the skin into the appropriate area as directed 1 (One) Time Per Week. THURSDAYS 2/7/24   Sherie Blandon MD   ergocalciferol (ERGOCALCIFEROL) 1.25 MG (85575 UT) capsule Take 1 capsule by mouth 1 (One) Time Per Week.  Patient not taking: Reported on 5/22/2024 12/5/23 6/9/24  Sherie Blandon MD        Social History:   Social History     Tobacco Use    Smoking status: Never     Passive exposure: Past    Smokeless tobacco: Never   Vaping Use    Vaping status: Never Used   Substance Use Topics    Alcohol use: Never    Drug use: Never         Review of Systems:  Review of Systems   Constitutional: Negative.    HENT: Negative.  Positive for nosebleeds.    Eyes: Negative.    Respiratory: Negative.     Cardiovascular: Negative.    Gastrointestinal: Negative.    Endocrine: Negative.    Genitourinary: Negative.    Musculoskeletal: Negative.    Skin: Negative.    Allergic/Immunologic: Negative.    Neurological: Negative.   "  Hematological: Negative.    Psychiatric/Behavioral: Negative.          Physical Exam:  /82   Pulse 66   Temp 97.7 °F (36.5 °C) (Oral)   Resp 16   Ht 154.9 cm (61\")   Wt 99.5 kg (219 lb 5.7 oz)   SpO2 97%   BMI 41.45 kg/m²         Physical Exam  Vitals and nursing note reviewed.   Constitutional:       Appearance: Normal appearance.   HENT:      Head: Normocephalic and atraumatic.      Nose:      Right Nostril: No epistaxis or septal hematoma.      Left Nostril: Epistaxis present. No septal hematoma.      Comments: There is a small scratch on the medial side of the L spetum.   Dried blood noted to the posterior oropharyngeal.      Mouth/Throat:      Mouth: Mucous membranes are moist.   Eyes:      Extraocular Movements: Extraocular movements intact.      Conjunctiva/sclera: Conjunctivae normal.      Pupils: Pupils are equal, round, and reactive to light.   Cardiovascular:      Rate and Rhythm: Normal rate. Rhythm irregular.      Heart sounds: Normal heart sounds.   Pulmonary:      Effort: Pulmonary effort is normal.      Breath sounds: Normal breath sounds.   Musculoskeletal:         General: Normal range of motion.      Cervical back: Normal range of motion and neck supple.   Skin:     General: Skin is warm and dry.   Neurological:      General: No focal deficit present.      Mental Status: She is alert and oriented to person, place, and time.   Psychiatric:         Mood and Affect: Mood normal.         Behavior: Behavior normal.                Procedures:  Procedures      Medical Decision Making:      Comorbidities that affect care:    Atrial Fibrillation, Hypertension    External Notes reviewed:    Previous Clinic Note: Pulmonary visit on 5/22/2024      The following orders were placed and all results were independently analyzed by me:  Orders Placed This Encounter   Procedures    Measure Blood Pressure       Medications Given in the Emergency Department:  Medications - No data to display     ED " Course:    The patient was initially evaluated in the triage area where orders were placed. The patient was later dispositioned by Mary Demarco PA-C.      The patient was advised to stay for completion of workup which includes but is not limited to communication of labs and radiological results, reassessment and plan. The patient was advised that leaving prior to disposition by a provider could result in critical findings that are not communicated to the patient.     ED Course as of 06/09/24 1539   Sun Jun 09, 2024   1538 Large blood clot blood from the left nostril.  No active bleeding noted. [AJ]      ED Course User Index  [AJ] Mary Demarco PA-C       Labs:    Lab Results (last 24 hours)       ** No results found for the last 24 hours. **             Imaging:    No Radiology Exams Resulted Within Past 24 Hours      Differential Diagnosis and Discussion:      Epistaxis: Differential diagnosis includes but is not limited to trauma, environmental exposure, coagulopathy, allergic, infectious, hypertension, foreign bodies, hormonal, and tumors.        MDM               Patient Care Considerations:    LABS: I considered ordering labs, however no active epistaxis      Consultants/Shared Management Plan:    None    Social Determinants of Health:    Patient is independent, reliable, and has access to care.       Disposition and Care Coordination:    Discharged: The patient is suitable and stable for discharge with no need for consideration of admission.    I have explained the patient´s condition, diagnoses and treatment plan based on the information available to me at this time. I have answered questions and addressed any concerns. The patient has a good  understanding of the patient´s diagnosis, condition, and treatment plan as can be expected at this point. The vital signs have been stable. The patient´s condition is stable and appropriate for discharge from the emergency department.      The patient will  pursue further outpatient evaluation with the primary care physician or other designated or consulting physician as outlined in the discharge instructions. They are agreeable to this plan of care and follow-up instructions have been explained in detail. The patient has received these instructions in written format and has expressed an understanding of the discharge instructions. The patient is aware that any significant change in condition or worsening of symptoms should prompt an immediate return to this or the closest emergency department or call to 911.    Final diagnoses:   Epistaxis        ED Disposition       ED Disposition   Discharge    Condition   Stable    Comment   --               This medical record created using voice recognition software.             Mary Demarco PA-C  06/09/24 1538

## 2024-06-19 ENCOUNTER — TRANSCRIBE ORDERS (OUTPATIENT)
Dept: ADMINISTRATIVE | Facility: HOSPITAL | Age: 84
End: 2024-06-19
Payer: MEDICARE

## 2024-06-19 DIAGNOSIS — K74.60 HEPATIC CIRRHOSIS, UNSPECIFIED HEPATIC CIRRHOSIS TYPE, UNSPECIFIED WHETHER ASCITES PRESENT: Primary | ICD-10-CM

## 2024-07-05 ENCOUNTER — HOSPITAL ENCOUNTER (OUTPATIENT)
Dept: ULTRASOUND IMAGING | Facility: HOSPITAL | Age: 84
Discharge: HOME | End: 2024-07-05
Payer: MEDICARE

## 2024-07-05 DIAGNOSIS — K74.60 HEPATIC CIRRHOSIS, UNSPECIFIED HEPATIC CIRRHOSIS TYPE, UNSPECIFIED WHETHER ASCITES PRESENT: ICD-10-CM

## 2024-07-05 PROCEDURE — 76705 ECHO EXAM OF ABDOMEN: CPT

## 2024-08-08 ENCOUNTER — APPOINTMENT (OUTPATIENT)
Dept: GENERAL RADIOLOGY | Facility: HOSPITAL | Age: 84
End: 2024-08-08
Payer: MEDICARE

## 2024-08-08 ENCOUNTER — HOSPITAL ENCOUNTER (EMERGENCY)
Facility: HOSPITAL | Age: 84
Discharge: HOME | End: 2024-08-08
Attending: EMERGENCY MEDICINE
Payer: MEDICARE

## 2024-08-08 VITALS
BODY MASS INDEX: 41.87 KG/M2 | RESPIRATION RATE: 18 BRPM | HEART RATE: 79 BPM | WEIGHT: 207.67 LBS | OXYGEN SATURATION: 95 % | HEIGHT: 59 IN | SYSTOLIC BLOOD PRESSURE: 145 MMHG | DIASTOLIC BLOOD PRESSURE: 67 MMHG | TEMPERATURE: 97.8 F

## 2024-08-08 DIAGNOSIS — I10 HYPERTENSION, UNSPECIFIED TYPE: Primary | ICD-10-CM

## 2024-08-08 LAB
ALBUMIN SERPL-MCNC: 3.6 G/DL (ref 3.5–5.2)
ALBUMIN/GLOB SERPL: 1.2 G/DL
ALP SERPL-CCNC: 89 U/L (ref 39–117)
ALT SERPL W P-5'-P-CCNC: 37 U/L (ref 1–33)
ANION GAP SERPL CALCULATED.3IONS-SCNC: 9.7 MMOL/L (ref 5–15)
AST SERPL-CCNC: 50 U/L (ref 1–32)
BASOPHILS # BLD AUTO: 0.05 10*3/MM3 (ref 0–0.2)
BASOPHILS NFR BLD AUTO: 0.8 % (ref 0–1.5)
BILIRUB SERPL-MCNC: 0.5 MG/DL (ref 0–1.2)
BUN SERPL-MCNC: 15 MG/DL (ref 8–23)
BUN/CREAT SERPL: 11 (ref 7–25)
CALCIUM SPEC-SCNC: 9 MG/DL (ref 8.6–10.5)
CHLORIDE SERPL-SCNC: 103 MMOL/L (ref 98–107)
CO2 SERPL-SCNC: 27.3 MMOL/L (ref 22–29)
CREAT SERPL-MCNC: 1.36 MG/DL (ref 0.57–1)
DEPRECATED RDW RBC AUTO: 40.4 FL (ref 37–54)
EGFRCR SERPLBLD CKD-EPI 2021: 38.3 ML/MIN/1.73
EOSINOPHIL # BLD AUTO: 0.46 10*3/MM3 (ref 0–0.4)
EOSINOPHIL NFR BLD AUTO: 7.2 % (ref 0.3–6.2)
ERYTHROCYTE [DISTWIDTH] IN BLOOD BY AUTOMATED COUNT: 12.6 % (ref 12.3–15.4)
GLOBULIN UR ELPH-MCNC: 3 GM/DL
GLUCOSE SERPL-MCNC: 156 MG/DL (ref 65–99)
HCT VFR BLD AUTO: 42.6 % (ref 34–46.6)
HGB BLD-MCNC: 13.9 G/DL (ref 12–15.9)
IMM GRANULOCYTES # BLD AUTO: 0.02 10*3/MM3 (ref 0–0.05)
IMM GRANULOCYTES NFR BLD AUTO: 0.3 % (ref 0–0.5)
LYMPHOCYTES # BLD AUTO: 1.61 10*3/MM3 (ref 0.7–3.1)
LYMPHOCYTES NFR BLD AUTO: 25.1 % (ref 19.6–45.3)
MCH RBC QN AUTO: 28.5 PG (ref 26.6–33)
MCHC RBC AUTO-ENTMCNC: 32.6 G/DL (ref 31.5–35.7)
MCV RBC AUTO: 87.3 FL (ref 79–97)
MONOCYTES # BLD AUTO: 0.61 10*3/MM3 (ref 0.1–0.9)
MONOCYTES NFR BLD AUTO: 9.5 % (ref 5–12)
NEUTROPHILS NFR BLD AUTO: 3.67 10*3/MM3 (ref 1.7–7)
NEUTROPHILS NFR BLD AUTO: 57.1 % (ref 42.7–76)
NRBC BLD AUTO-RTO: 0 /100 WBC (ref 0–0.2)
NT-PROBNP SERPL-MCNC: 276.4 PG/ML (ref 0–1800)
PLATELET # BLD AUTO: 190 10*3/MM3 (ref 140–450)
PMV BLD AUTO: 10.8 FL (ref 6–12)
POTASSIUM SERPL-SCNC: 4.4 MMOL/L (ref 3.5–5.2)
PROT SERPL-MCNC: 6.6 G/DL (ref 6–8.5)
QT INTERVAL: 427 MS
QTC INTERVAL: 464 MS
RBC # BLD AUTO: 4.88 10*6/MM3 (ref 3.77–5.28)
SODIUM SERPL-SCNC: 140 MMOL/L (ref 136–145)
TROPONIN T SERPL HS-MCNC: 14 NG/L
WBC NRBC COR # BLD AUTO: 6.42 10*3/MM3 (ref 3.4–10.8)

## 2024-08-08 PROCEDURE — 80053 COMPREHEN METABOLIC PANEL: CPT | Performed by: EMERGENCY MEDICINE

## 2024-08-08 PROCEDURE — 99284 EMERGENCY DEPT VISIT MOD MDM: CPT

## 2024-08-08 PROCEDURE — 93005 ELECTROCARDIOGRAM TRACING: CPT | Performed by: EMERGENCY MEDICINE

## 2024-08-08 PROCEDURE — 85025 COMPLETE CBC W/AUTO DIFF WBC: CPT | Performed by: EMERGENCY MEDICINE

## 2024-08-08 PROCEDURE — 93010 ELECTROCARDIOGRAM REPORT: CPT | Performed by: INTERNAL MEDICINE

## 2024-08-08 PROCEDURE — 84484 ASSAY OF TROPONIN QUANT: CPT | Performed by: EMERGENCY MEDICINE

## 2024-08-08 PROCEDURE — 83880 ASSAY OF NATRIURETIC PEPTIDE: CPT | Performed by: EMERGENCY MEDICINE

## 2024-08-08 PROCEDURE — 71045 X-RAY EXAM CHEST 1 VIEW: CPT

## 2024-08-08 NOTE — ED PROVIDER NOTES
Time: 2:09 AM EDT  Date of encounter:  2024  Independent Historian/Clinical History and Information was obtained by:   Patient    History is limited by: N/A    Chief Complaint: htn      History of Present Illness:  Patient is a 83 y.o. year old female who presents to the emergency department for evaluation of Hypertension.  Patient reports he is noticing her blood pressure has been higher than usual over the last week.  She denies any associated symptoms with this.  She does states she has been feeling more tired but no chest pain, shortness of breath, fever, chills, headache or any other symptoms.  She has been followed by cardiology.  She has been taking medication as prescribed.      Patient Care Team  Primary Care Provider: Cindy Sal MD    Past Medical History:     Allergies   Allergen Reactions    Aricept [Donepezil] Unknown - High Severity    Diphenhydramine Unknown - Low Severity    Exenatide GI Intolerance    Lisinopril Swelling    Nsaids Unknown - High Severity and Unknown - Low Severity    Pseudoephedrine Swelling     Past Medical History:   Diagnosis Date    Asthma     Atrial fibrillation     Atrial fibrillation     Back pain     Breast cancer     Cancer     breast    CHF (congestive heart failure)     Cirrhosis of liver not due to alcohol     Diabetes mellitus     GERD (gastroesophageal reflux disease)     Hypertension     Kidney stone     Neuropathy     Sleep apnea     CPAP     Past Surgical History:   Procedure Laterality Date     SECTION      CHOLECYSTECTOMY      COLONOSCOPY N/A 2022    Procedure: COLONOSCOPY WITH HOT SNARE PIECE MEAL POLYPECTOMY, ORISE INJECTION;  Surgeon: Pierre Delong MD;  Location: [facility] ENDOSCOPY;  Service: Gastroenterology;  Laterality: N/A;  COLON POLYPS    COLONOSCOPY N/A 2022    Procedure: COLONOSCOPY WITH POLYPECTOMY, CAUTERY;  Surgeon: Pierre Delong MD;  Location: [facility] ENDOSCOPY;  Service: Gastroenterology;  Laterality: N/A;   COLON POLYP    COLONOSCOPY N/A 01/31/2023    Procedure: COLONOSCOPY WITH HOT SNARE POLYPECTOMY;  Surgeon: Pierre Delong MD;  Location: [facility] ENDOSCOPY;  Service: Gastroenterology;  Laterality: N/A;  HEMORRHOID, COLON POLYP    ENDOSCOPY N/A 02/08/2022    Procedure: ESOPHAGOGASTRODUODENOSCOPY WITH HOT SNARE POLYPECTOMY, BIOPSIES, ORISE INJECTION AND CLIP APPLICATION X1;  Surgeon: Pierre Delong MD;  Location: [facility] ENDOSCOPY;  Service: Gastroenterology;  Laterality: N/A;  GASTRIC POLYP AND HIATAL HERNIA    EYE SURGERY Bilateral     cataracts    HERNIA REPAIR      inguinal    HYSTERECTOMY      KIDNEY STONE SURGERY      MASTECTOMY Bilateral     PACEMAKER IMPLANTATION       Family History   Problem Relation Age of Onset    Colon polyps Father     Colon polyps Other     Malig Hyperthermia Neg Hx        Home Medications:  Prior to Admission medications    Medication Sig Start Date End Date Taking? Authorizing Provider   apixaban (ELIQUIS) 5 MG tablet tablet Take 1 tablet by mouth 2 (Two) Times a Day.    Emergency, Nurse Epic, RN   cetirizine (zyrTEC) 10 MG tablet Take 1 tablet by mouth Daily for 90 days. 8/10/23 5/22/24  Daysi Pizarro APRN   fluticasone-salmeterol (Advair HFA) 230-21 MCG/ACT inhaler Inhale 2 puffs 2 (Two) Times a Day for 90 days. Rinse mouth out after each use 5/22/24 8/20/24  Daysi Pizarro APRN   FREESTYLE LITE test strip  8/12/22   ProviderSherie MD   gabapentin (NEURONTIN) 600 MG tablet Take 1 tablet by mouth 2 (Two) Times a Day.    ProviderSherie MD   hydrALAZINE (APRESOLINE) 50 MG tablet     Sherie Blandon MD   Incruse Ellipta 62.5 MCG/ACT aerosol powder  Inhale 1 puff Daily for 90 days. 5/22/24 8/20/24  Daysi Pizarro APRN   insulin glargine (LANTUS, SEMGLEE) 100 UNIT/ML injection Inject 45 Units under the skin into the appropriate area as directed Every Night.    Sherie Blandon MD   levalbuterol (XOPENEX HFA) 45 MCG/ACT inhaler Inhale 2 puffs  Every 4 (Four) Hours As Needed for Wheezing or Shortness of Air for up to 90 days. 5/22/24 8/20/24  Daysi Pizarro APRN   metoprolol succinate XL (TOPROL-XL) 25 MG 24 hr tablet Take 3 tablets by mouth Every 12 (Twelve) Hours for 30 days. 2/22/24 5/22/24  Mustapha Duff MD   montelukast (SINGULAIR) 10 MG tablet Take 1 tablet by mouth Every Night for 90 days. 3/4/24 6/2/24  Daysi Pizarro APRN   NovoLOG FlexPen 100 UNIT/ML solution pen-injector sc pen Inject 20 Units under the skin into the appropriate area as directed 3 (Three) Times a Day With Meals. 11/7/23   Sherie Blandon MD   olmesartan (BENICAR) 40 MG tablet Take 1 tablet by mouth Daily.    Emergency, Nurse Gely, RN   pantoprazole (PROTONIX) 40 MG EC tablet Take 1 tablet by mouth Daily.    ProviderSherie MD   spironolactone-hydrochlorothiazide (ALDACTAZIDE) 25-25 MG tablet Take 1 tablet by mouth Daily. 10/11/21   Sherie Blandon MD   Trulicity 3 MG/0.5ML solution pen-injector Inject 0.5 mL under the skin into the appropriate area as directed 1 (One) Time Per Week. THURSDAYS 2/7/24   Sherie Blandon MD        Social History:   Social History     Tobacco Use    Smoking status: Never     Passive exposure: Past    Smokeless tobacco: Never   Vaping Use    Vaping status: Never Used   Substance Use Topics    Alcohol use: Never    Drug use: Never         Review of Systems:  Review of Systems   Constitutional:  Positive for fatigue. Negative for chills and fever.   HENT:  Negative for congestion, ear pain and sore throat.    Eyes:  Negative for pain.   Respiratory:  Negative for cough, chest tightness and shortness of breath.    Cardiovascular:  Negative for chest pain.   Gastrointestinal:  Negative for abdominal pain, diarrhea, nausea and vomiting.   Genitourinary:  Negative for flank pain and hematuria.   Musculoskeletal:  Negative for joint swelling.   Skin:  Negative for pallor.   Neurological:  Negative for seizures and headaches.  "  All other systems reviewed and are negative.       Physical Exam:  /73   Pulse 70   Temp 98.1 °F (36.7 °C) (Oral)   Resp 20   Ht 149.9 cm (59\")   Wt 94.2 kg (207 lb 10.8 oz)   SpO2 97%   BMI 41.94 kg/m²     Physical Exam  Constitutional:       Appearance: Normal appearance.   HENT:      Head: Normocephalic and atraumatic.      Nose: Nose normal.      Mouth/Throat:      Mouth: Mucous membranes are moist.   Eyes:      Extraocular Movements: Extraocular movements intact.      Conjunctiva/sclera: Conjunctivae normal.      Pupils: Pupils are equal, round, and reactive to light.   Cardiovascular:      Rate and Rhythm: Normal rate and regular rhythm.      Pulses: Normal pulses.      Heart sounds: Normal heart sounds.   Pulmonary:      Effort: Pulmonary effort is normal.      Breath sounds: Normal breath sounds.   Abdominal:      General: There is no distension.      Palpations: Abdomen is soft.      Tenderness: There is no abdominal tenderness.   Musculoskeletal:         General: Normal range of motion.      Cervical back: Normal range of motion.   Skin:     General: Skin is warm and dry.      Capillary Refill: Capillary refill takes less than 2 seconds.   Neurological:      General: No focal deficit present.      Mental Status: She is alert and oriented to person, place, and time. Mental status is at baseline.   Psychiatric:         Mood and Affect: Mood normal.         Behavior: Behavior normal.                  Procedures:  Procedures      Medical Decision Making:      Comorbidities that affect care:    cirrhosis, Asthma, Atrial Fibrillation, Cancer, Congestive Heart Failure, Diabetes, Hypertension    External Notes reviewed:    Previous Clinic Note: Patient seen by her PCP on 5/22/2024 for seasonal allergies, reflux, chronic cough, elevated IgE levels peripheral eosinophilia, chronic dyspnea, asthma, UNIQUE on CPAP      The following orders were placed and all results were independently analyzed by " me:  Orders Placed This Encounter   Procedures    XR Chest 1 View    Comprehensive Metabolic Panel    High Sensitivity Troponin T    BNP    CBC Auto Differential    High Sensitivity Troponin T 2Hr    ECG 12 Lead Rhythm Change    CBC & Differential       Medications Given in the Emergency Department:  Medications - No data to display     ED Course:    ED Course as of 08/08/24 0405   Thu Aug 08, 2024   0403 ECG 12 Lead Rhythm Change  Atrial paced complexes with rate of 71.  Right bundle branch block.  No acute ST elevation.  Normal MI and QTc.  EKG interpreted by me [LD]      ED Course User Index  [LD] Mg Morris MD       Labs:    Lab Results (last 24 hours)       Procedure Component Value Units Date/Time    CBC & Differential [[id number]]  (Abnormal) Collected: 08/08/24 0231    Specimen: Blood Updated: 08/08/24 0238    Narrative:      The following orders were created for panel order CBC & Differential.  Procedure                               Abnormality         Status                     ---------                               -----------         ------                     CBC Auto Differential[[id number]]        Abnormal            Final result                 Please view results for these tests on the individual orders.    BNP [[id number]]  (Normal) Collected: 08/08/24 0231    Specimen: Blood Updated: 08/08/24 0252     proBNP 276.4 pg/mL     Narrative:      This assay is used as an aid in the diagnosis of individuals suspected of having heart failure. It can be used as an aid in the diagnosis of acute decompensated heart failure (ADHF) in patients presenting with signs and symptoms of ADHF to the emergency department (ED). In addition, NT-proBNP of <300 pg/mL indicates ADHF is not likely.    Age Range Result Interpretation  NT-proBNP Concentration (pg/mL:      <50             Positive            >450                   Gray                 300-450                    Negative             <300    50-75            Positive            >900                  Gray                300-900                  Negative            <300      >75             Positive            >1800                  Gray                300-1800                  Negative            <300    CBC Auto Differential [[id number]]  (Abnormal) Collected: 08/08/24 0231    Specimen: Blood Updated: 08/08/24 0238     WBC 6.42 10*3/mm3      RBC 4.88 10*6/mm3      Hemoglobin 13.9 g/dL      Hematocrit 42.6 %      MCV 87.3 fL      MCH 28.5 pg      MCHC 32.6 g/dL      RDW 12.6 %      RDW-SD 40.4 fl      MPV 10.8 fL      Platelets 190 10*3/mm3      Neutrophil % 57.1 %      Lymphocyte % 25.1 %      Monocyte % 9.5 %      Eosinophil % 7.2 %      Basophil % 0.8 %      Immature Grans % 0.3 %      Neutrophils, Absolute 3.67 10*3/mm3      Lymphocytes, Absolute 1.61 10*3/mm3      Monocytes, Absolute 0.61 10*3/mm3      Eosinophils, Absolute 0.46 10*3/mm3      Basophils, Absolute 0.05 10*3/mm3      Immature Grans, Absolute 0.02 10*3/mm3      nRBC 0.0 /100 WBC     Comprehensive Metabolic Panel [[id number]]  (Abnormal) Collected: 08/08/24 0324    Specimen: Blood Updated: 08/08/24 0354     Glucose 156 mg/dL      BUN 15 mg/dL      Creatinine 1.36 mg/dL      Sodium 140 mmol/L      Potassium 4.4 mmol/L      Comment: Slight hemolysis detected by analyzer. Result may be falsely elevated.        Chloride 103 mmol/L      CO2 27.3 mmol/L      Calcium 9.0 mg/dL      Total Protein 6.6 g/dL      Albumin 3.6 g/dL      ALT (SGPT) 37 U/L      AST (SGOT) 50 U/L      Comment: Slight hemolysis detected by analyzer. Result may be falsely elevated.        Alkaline Phosphatase 89 U/L      Total Bilirubin 0.5 mg/dL      Globulin 3.0 gm/dL      A/G Ratio 1.2 g/dL      BUN/Creatinine Ratio 11.0     Anion Gap 9.7 mmol/L      eGFR 38.3 mL/min/1.73     Narrative:      GFR Normal >60  Chronic Kidney Disease <60  Kidney Failure <15    The GFR formula is only valid for adults with stable renal function between  ages 18 and 70.    High Sensitivity Troponin T [[id number]]  (Abnormal) Collected: 08/08/24 0324    Specimen: Blood Updated: 08/08/24 0354     HS Troponin T 14 ng/L     Narrative:      High Sensitive Troponin T Reference Range:  <14.0 ng/L- Negative Female for AMI  <22.0 ng/L- Negative Male for AMI  >=14 - Abnormal Female indicating possible myocardial injury.  >=22 - Abnormal Male indicating possible myocardial injury.   Clinicians would have to utilize clinical acumen, EKG, Troponin, and serial changes to determine if it is an Acute Myocardial Infarction or myocardial injury due to an underlying chronic condition.                  Imaging:    XR Chest 1 View    Result Date: 8/8/2024  XR CHEST 1 VW Date of Exam: 8/8/2024 2:40 AM EDT Indication: Persistent cough. Hypertension. Comparison: 2/19/2024 Findings: Heart is enlarged. Left-sided dual-lead pacer is present. There is some mild atelectasis in the bases. Lungs are otherwise clear. No pneumothorax. Pulmonary vascularity is normal. Surgical clips noted in the right axilla.     Stable cardiac enlargement. Mild bibasilar atelectasis. Electronically Signed: Aleksandr Love MD  8/8/2024 2:54 AM EDT  Workstation ID: [id number]       Differential Diagnosis and Discussion:    Metabolic: Differential diagnosis includes but is not limited to hypertension, hyperglycemia, hyperkalemia, hypocalcemia, metabolic acidosis, hypokalemia, hypoglycemia, malnutrition, hypothyroidism, hyperthyroidism, and adrenal insufficiency.     All labs were reviewed and interpreted by me.  All X-rays impressions were independently interpreted by me.  EKG was interpreted by me.    MDM  Number of Diagnoses or Management Options  Diagnosis management comments: Patient is afebrile and nontoxic-appearing.  Vital signs are stable.  At time of evaluation she is lying in bed in no acute distress.  Patient reports hypertension.  She denies any symptoms with this.  Labs show no significant abnormality.  EKG  showed atrial paced complexes with rate of 71.  No acute ST elevation.  Blood pressure improved without any intervention.  Recommend follow-up with her primary care physician and cardiologist.  Discussed return precautions, discharge instructions and answered all her questions.       Amount and/or Complexity of Data Reviewed  Clinical lab tests: reviewed  Tests in the radiology section of CPT®: reviewed  Review and summarize past medical records: yes  Independent visualization of images, tracings, or specimens: yes    Risk of Complications, Morbidity, and/or Mortality  Presenting problems: moderate  Management options: moderate                 Patient Care Considerations:    SEPSIS was considered but is NOT present in the emergency department as SIRS criteria is not present.      Consultants/Shared Management Plan:    None    Social Determinants of Health:    Patient is independent, reliable, and has access to care.       Disposition and Care Coordination:    Discharged: The patient is suitable and stable for discharge with no need for consideration of admission.    I have explained the patient´s condition, diagnoses and treatment plan based on the information available to me at this time. I have answered questions and addressed any concerns. The patient has a good  understanding of the patient´s diagnosis, condition, and treatment plan as can be expected at this point. The vital signs have been stable. The patient´s condition is stable and appropriate for discharge from the emergency department.      The patient will pursue further outpatient evaluation with the primary care physician or other designated or consulting physician as outlined in the discharge instructions. They are agreeable to this plan of care and follow-up instructions have been explained in detail. The patient has received these instructions in written format and has expressed an understanding of the discharge instructions. The patient is aware  that any significant change in condition or worsening of symptoms should prompt an immediate return to this or the closest emergency department or call to 911.  I have explained discharge medications and the need for follow up with the patient/caretakers. This was also printed in the discharge instructions. Patient was discharged with the following medications and follow up:      Medication List      No changes were made to your prescriptions during this visit.      Cindy Sal MD  [address] [id number] [phone]    In 2 days         Final diagnoses:   Hypertension, unspecified type        ED Disposition       ED Disposition   Discharge    Condition   Stable    Comment   --               This medical record created using voice recognition software.             Mg Morris MD  08/08/24 1425

## 2024-09-02 NOTE — PROGRESS NOTES
Primary Care Provider  Cindy Sal MD     Referring Provider  No ref. provider found     Chief Complaint  Seasonal allergies and Follow-up (3-4 month follow up. )    Subjective          History of Presenting Illness  Patient is an 83-year-old female, patient Dr. Bowman's who presents management dyspnea who presents for follow-up visit today.  Patient states that she does get short of breath that is worse with exertion, moderate in severity, and improved with rest.  Patient states that she is taking Advair, however admits to only using once daily on some days.  Patient states that she is taking Incruse every day as prescribed and uses Xopenex inhaler as needed.  Patient states that she is also taking Zyrtec and Singulair as for seasonal allergies. Patient is on Protonix for reflux.  Patient states that she is using her CPAP machine when she sleeps and receives her supplies through [facility] that is managed by Dr. Randall.  Patient denies any morning headaches or excessive daytime sleepiness.  Patient denies fever, chills, night sweats, swollen glands in the head and neck, unintentional weight loss, hemoptysis, purulent sputum production, dysphagia, chest pain, palpitations, chest tightness, abdominal pain, nausea, vomiting, and diarrhea.  Patient also denies any myalgias, changes in sense of taste and/or smell, sore throat, any other coronavirus or flu-like symptoms.  Patient denies any leg swelling, orthopnea, paroxysmal nocturnal dyspnea.  Patient is able to perform activities of daily living.        Review of Systems     Family History   Problem Relation Age of Onset    Colon polyps Father     Colon polyps Other     Malig Hyperthermia Neg Hx         Social History     Socioeconomic History    Marital status:    Tobacco Use    Smoking status: Never     Passive exposure: Past    Smokeless tobacco: Never   Vaping Use    Vaping status: Never Used   Substance and Sexual Activity    Alcohol use: Never     Drug use: Never    Sexual activity: Defer        Past Medical History:   Diagnosis Date    Asthma     Atrial fibrillation     Atrial fibrillation     Back pain     Breast cancer     Cancer     breast    CHF (congestive heart failure)     Cirrhosis of liver not due to alcohol     Diabetes mellitus     GERD (gastroesophageal reflux disease)     Hypertension     Kidney stone     Neuropathy     Sleep apnea     CPAP        Immunization History   Administered Date(s) Administered    COVID-19 (PFIZER) Purple Cap Monovalent 02/13/2021, 03/06/2021, 09/30/2021    Covid-19 (Pfizer) Gray Cap Monovalent 05/16/2022    Flu Vaccine Split Quad 10/09/2015    Fluad Quad 65+ 08/28/2024    Fluzone (or Fluarix & Flulaval for VFC) >6mos 10/09/2015    Fluzone High-Dose 65+YRS 08/23/2012    Fluzone Quad >6mos (Multi-dose) 09/12/2011, 08/22/2013, 09/15/2016, 09/14/2017, 09/18/2018    Influenza Injectable Mdck Pf Quad 10/13/2020, 10/18/2021    Influenza, Unspecified 10/13/2020, 10/18/2021    PEDS-Pneumococcal Conjugate (PCV7) 06/07/2023    Pneumococcal Conjugate 13-Valent (PCV13) 03/15/2016    Pneumococcal Conjugate 20-Valent (PCV20) 06/07/2023       Allergies   Allergen Reactions    Aricept [Donepezil] Unknown - High Severity    Diphenhydramine Unknown - Low Severity    Exenatide GI Intolerance    Lisinopril Swelling    Nsaids Unknown - High Severity and Unknown - Low Severity    Pseudoephedrine Swelling          Current Outpatient Medications:     apixaban (ELIQUIS) 5 MG tablet tablet, Take 1 tablet by mouth 2 (Two) Times a Day., Disp: , Rfl:     cetirizine (zyrTEC) 10 MG tablet, Take 1 tablet by mouth Daily for 90 days., Disp: 90 tablet, Rfl: 3    gabapentin (NEURONTIN) 600 MG tablet, Take 1 tablet by mouth 2 (Two) Times a Day., Disp: , Rfl:     hydrALAZINE (APRESOLINE) 50 MG tablet, , Disp: , Rfl:     insulin glargine (LANTUS, SEMGLEE) 100 UNIT/ML injection, Inject 45 Units under the skin into the appropriate area as directed Every  Night., Disp: , Rfl:     levalbuterol (XOPENEX HFA) 45 MCG/ACT inhaler, Inhale 2 puffs Every 4 (Four) Hours As Needed for Wheezing or Shortness of Air for up to 90 days., Disp: 3 each, Rfl: 3    metoprolol succinate XL (TOPROL-XL) 25 MG 24 hr tablet, Take 3 tablets by mouth Every 12 (Twelve) Hours for 30 days. (Patient taking differently: Take 2 tablets by mouth Every 12 (Twelve) Hours.), Disp: 180 tablet, Rfl: 0    montelukast (SINGULAIR) 10 MG tablet, Take 1 tablet by mouth Every Night for 90 days., Disp: 90 tablet, Rfl: 3    NovoLOG FlexPen 100 UNIT/ML solution pen-injector sc pen, Inject 20 Units under the skin into the appropriate area as directed 3 (Three) Times a Day With Meals., Disp: , Rfl:     olmesartan (BENICAR) 40 MG tablet, Take 10 mg by mouth Daily., Disp: , Rfl:     pantoprazole (PROTONIX) 40 MG EC tablet, Take 1 tablet by mouth Daily., Disp: , Rfl:     spironolactone-hydrochlorothiazide (ALDACTAZIDE) 25-25 MG tablet, Take 1 tablet by mouth Daily., Disp: , Rfl:     Trulicity 3 MG/0.5ML solution pen-injector, Inject 0.5 mL under the skin into the appropriate area as directed 1 (One) Time Per Week. THURSDAYS, Disp: , Rfl:     Umeclidinium Bromide (Incruse Ellipta) 62.5 MCG/ACT aerosol powder , Inhale 1 puff Daily., Disp: , Rfl:     fluticasone-salmeterol (ADVAIR HFA) 230-21 MCG/ACT inhaler, Inhale 2 puffs 2 (Two) Times a Day., Disp: , Rfl:     FREESTYLE LITE test strip, , Disp: , Rfl:      Objective     Physical Exam  Vital Signs:   WDWN, Alert, NAD.    HEENT:  PERRL, EOMI.  OP, nares clear, no sinus tenderness  Neck:  Supple, no JVD, no thyromegaly.  Lymph: no axillary, cervical, supraclavicular lymphadenopathy noted bilaterally  Chest:  good aeration, clear to auscultation bilaterally, tympanic to percussion bilaterally, no work of breathing noted  CV: RRR, no MGR, pulses 2+, equal.  Abd:  Soft, NT, ND, + BS, no HSM  EXT:  no clubbing, no cyanosis, no edema, no joint tenderness  Neuro:  A&Ox3, CN  grossly intact, no focal deficits.  Skin: No rashes or lesions noted.    There were no vitals taken for this visit.        Result Review :   I have reviewed my last office visit note.     Procedures:            Assessment and Plan      Assessment:  1.  Asthma.  Methacholine challenge test consistent with asthma.  Patient is on LABA/ICS therapy.  2. Chronic dyspnea. PFTs normal.  Methacholine challenge test consistent with asthma.   Alpha-1 with a normal genotype of M/M with level of 117.   3.  Peripheral eosinophilia.  4.  Obstructive sleep apnea on CPAP.  Patient is under the care of the sleep center with Dr. Randall.  5.  Elevated IgE level: 80.1.  6.  Seasonal allergies.  7.  GERD.  Patient is on a PPI.  8.  Paroxysmal atrial fibrillation: Patient is under the care of Dr. Ghassan Jimenez.  9..  Never smoker.           Plan:  1.  Patient states that at times she is only taking Advair once daily instead of twice daily as prescribed.  Patient is advised to take Advair 2 puffs twice daily as prescribed and rinse mouth out after each use.  Patient to continue Incruse as prescribed.  2.  Continue Xopenex inhaler as needed.  Asthma action plan discussed with the patient in the office today.  3.  Continue Singulair and Zyrtec and follow-up with allergist as scheduled.    4.  Follow-up with cardiologist as scheduled.  5.  For GERD,  patient to continue PPI.   Patient is also advised to sleep with the head of the bed elevated and do not eat 3-4 hours prior to bedtime.  6.  Continue CPAP at night and with naps at current settings and clean mask and tubing daily.  Follow-up with the sleep center as scheduled.  7.  Vaccination status: patient reports they are up-to-date with flu, pneumonia, and Covid vaccines.  Patient is advised to continue to follow CDC recommendations such as social distancing wearing a mask and washing hands for at least 20 seconds.  8.  Smoking status: Never smoker.  9.  Patient to call the office, 911, or  go to the ER with new or worsening symptoms.  10.  Follow-up with cardiologist as scheduled.  11.  Follow-up in January 2025, sooner if needed.              Follow Up   Return for January 2025.  Patient was given instructions and counseling regarding her condition or for health maintenance advice. Please see specific information pulled into the AVS if appropriate.

## 2024-09-05 ENCOUNTER — OFFICE VISIT (OUTPATIENT)
Dept: PULMONOLOGY | Facility: CLINIC | Age: 84
End: 2024-09-05
Payer: MEDICARE

## 2024-09-05 DIAGNOSIS — I48.0 PAROXYSMAL ATRIAL FIBRILLATION: Chronic | ICD-10-CM

## 2024-09-05 DIAGNOSIS — K21.9 GASTROESOPHAGEAL REFLUX DISEASE, UNSPECIFIED WHETHER ESOPHAGITIS PRESENT: ICD-10-CM

## 2024-09-05 DIAGNOSIS — D72.19 PERIPHERAL EOSINOPHILIA: ICD-10-CM

## 2024-09-05 DIAGNOSIS — R76.8 ELEVATED IGE LEVEL: ICD-10-CM

## 2024-09-05 DIAGNOSIS — R06.09 CHRONIC DYSPNEA: ICD-10-CM

## 2024-09-05 DIAGNOSIS — G47.33 OSA ON CPAP: ICD-10-CM

## 2024-09-05 DIAGNOSIS — J30.2 SEASONAL ALLERGIES: Primary | ICD-10-CM

## 2024-09-05 DIAGNOSIS — J45.909 ASTHMA, UNSPECIFIED ASTHMA SEVERITY, UNSPECIFIED WHETHER COMPLICATED, UNSPECIFIED WHETHER PERSISTENT: ICD-10-CM

## 2024-09-05 PROCEDURE — 1159F MED LIST DOCD IN RCRD: CPT | Performed by: NURSE PRACTITIONER

## 2024-09-05 PROCEDURE — 1160F RVW MEDS BY RX/DR IN RCRD: CPT | Performed by: NURSE PRACTITIONER

## 2024-09-05 PROCEDURE — 99214 OFFICE O/P EST MOD 30 MIN: CPT | Performed by: NURSE PRACTITIONER

## 2024-09-05 RX ORDER — MONTELUKAST SODIUM 10 MG/1
10 TABLET ORAL NIGHTLY
Qty: 90 TABLET | Refills: 3 | Status: SHIPPED | OUTPATIENT
Start: 2024-09-05 | End: 2024-12-04

## 2024-09-05 RX ORDER — UMECLIDINIUM 62.5 UG/1
1 AEROSOL, POWDER ORAL DAILY
COMMUNITY

## 2024-09-05 RX ORDER — ALBUTEROL SULFATE 90 UG/1
2 AEROSOL, METERED RESPIRATORY (INHALATION) EVERY 4 HOURS PRN
COMMUNITY
End: 2024-09-05

## 2024-09-05 RX ORDER — FLUTICASONE PROPIONATE AND SALMETEROL XINAFOATE 230; 21 UG/1; UG/1
2 AEROSOL, METERED RESPIRATORY (INHALATION)
COMMUNITY

## 2024-09-11 ENCOUNTER — OFFICE VISIT (OUTPATIENT)
Dept: SLEEP MEDICINE | Facility: HOSPITAL | Age: 84
End: 2024-09-11
Payer: MEDICARE

## 2024-09-11 VITALS
BODY MASS INDEX: 42.21 KG/M2 | OXYGEN SATURATION: 95 % | HEIGHT: 59 IN | HEART RATE: 65 BPM | DIASTOLIC BLOOD PRESSURE: 54 MMHG | WEIGHT: 209.4 LBS | SYSTOLIC BLOOD PRESSURE: 127 MMHG

## 2024-09-11 DIAGNOSIS — G47.33 OSA ON CPAP: Primary | ICD-10-CM

## 2024-09-11 DIAGNOSIS — I10 PRIMARY HYPERTENSION: ICD-10-CM

## 2024-09-11 DIAGNOSIS — E66.01 CLASS 3 SEVERE OBESITY DUE TO EXCESS CALORIES WITHOUT SERIOUS COMORBIDITY WITH BODY MASS INDEX (BMI) OF 40.0 TO 44.9 IN ADULT: ICD-10-CM

## 2024-09-11 DIAGNOSIS — I48.0 PAROXYSMAL ATRIAL FIBRILLATION: Chronic | ICD-10-CM

## 2024-09-11 PROCEDURE — 99214 OFFICE O/P EST MOD 30 MIN: CPT | Performed by: INTERNAL MEDICINE

## 2024-09-11 PROCEDURE — G0463 HOSPITAL OUTPT CLINIC VISIT: HCPCS

## 2024-09-11 PROCEDURE — 1159F MED LIST DOCD IN RCRD: CPT | Performed by: INTERNAL MEDICINE

## 2024-09-11 PROCEDURE — 1160F RVW MEDS BY RX/DR IN RCRD: CPT | Performed by: INTERNAL MEDICINE

## 2024-09-11 RX ORDER — IPRATROPIUM BROMIDE 17 UG/1
AEROSOL, METERED RESPIRATORY (INHALATION)
COMMUNITY

## 2024-09-11 RX ORDER — IBUPROFEN 800 MG/1
1 TABLET, FILM COATED ORAL EVERY 6 HOURS PRN
COMMUNITY

## 2024-09-11 RX ORDER — EPINEPHRINE 0.3 MG/.3ML
INJECTION SUBCUTANEOUS
COMMUNITY

## 2024-09-11 RX ORDER — ECHINACEA PURPUREA EXTRACT 125 MG
TABLET ORAL EVERY 6 HOURS SCHEDULED
COMMUNITY
Start: 2024-06-10

## 2024-09-11 RX ORDER — FLUTICASONE PROPIONATE 50 MCG
SPRAY, SUSPENSION (ML) NASAL
COMMUNITY

## 2024-09-11 NOTE — PROGRESS NOTES
"  [facility]  Sleep Medicine   [address] [place] [address]  [place]   [place] [address]  Phone: [phone]  Fax: [phone]      SLEEP CLINIC FOLLOW UP PROGRESS NOTE.    Bella Ritter  [id number]   1940  84 y.o.  female      PCP: Cindy Sal MD      Date of visit: 9/11/2024    Chief Complaint   Patient presents with    Sleep Apnea    Obesity       HPI:  This is a 84 y.o. years old patient is here for the management of obstructive sleep apnea.  Sleep apnea is severe with AHI of 38/h.  Patient is using positive airway pressure therapy with auto CPAP.  Normally patient goes to bed at 1130 PM and wakes up at 11 AM .  The patient wakes up 1 time(s) during the night and has no problem going back to sleep.  Feels refreshed after waking up.     Unfortunately her CPAP is broken.  I have reviewed her sleep study which was done on March 7, 2018 which showed severe sleep apnea with AHI of 38/h.  Also has history of atrial fibrillation and hypertension.  She needs a new CPAP I have sent an order to [facility] to get a new CPAP and see me back in 31 to 90 days    Medications and allergies are reviewed by me and documented in the encounter.     SOCIAL (habits pertaining to sleep medicine)  History tobacco use:No   History of alcohol use: 0 per week  Caffeine use: 0     REVIEW OF SYSTEMS:   Pertaining positive symptoms are:  [place] Sleepiness Scale :Total score: 5   Postnasal drip      PHYSICAL EXAMINATION:  CONSTITUTIONAL:  Vitals:    09/11/24 1500   BP: 127/54   Pulse: 65   SpO2: 95%   Weight: 95 kg (209 lb 6.4 oz)   Height: 149.9 cm (59.02\")    Body mass index is 42.27 kg/m².   NOSE: nasal passages are clear, No deformities noted   RESP SYSTEM: Not in any respiratory distress, no chest deformities noted,   CARDIOVASULAR: No edema noted  NEURO: Oriented x 3, gait normal,  Mood and affect appeared appropriate      CPAP is broken      ASSESSMENT AND PLAN:  Obstructive sleep apnea ( G 47.33).  Patient has " severe sleep apnea along with hypertension and atrial fibrillation.  She needs a new CPAP.  I have sent an order to [facility] to get her a new CPAP and see me back in 31 to 90 days for compliance.  Patient is benefiting from the CPAP and it is medically necessary.  Without proper control of sleep apnea and good compliance there is a increased risk for hypertension, diabetes mellitus and nonrestorative sleep with hypersomnia which can increase risk for motor vehicle accidents.  Untreated sleep apnea is also a risk factor for development of atrial fibrillation, pulmonary hypertension, insulin resistance and stroke. The patient is also instructed to get the supplies from the [facility] and and change them on a regular basis.  A prescription for supplies has been sent to the [facility].  I have also discussed the good sleep hygiene habits and adequate amount of sleep needed for good health.  Obesity  3 with BMI is Body mass index is 42.27 kg/m².. I have discuss the relationship between the weight and sleep apnea. The benefit of weight loss in reducing severity of sleep apnea was discussed. Discussed diet and exercise with the patient to achieve ideal BMI.  Hypertension  Atrial fibrillation.    Return for 31 to 90 days after PAP setup with down load. . Patient's questions were answered.    9/11/2024  Gil Randall MD  Sleep Medicine.  Medical Director,   [facility].

## 2024-09-23 ENCOUNTER — TRANSCRIBE ORDERS (OUTPATIENT)
Dept: ADMINISTRATIVE | Facility: HOSPITAL | Age: 84
End: 2024-09-23
Payer: MEDICARE

## 2024-09-23 DIAGNOSIS — M25.552 LEFT HIP PAIN: ICD-10-CM

## 2024-09-23 DIAGNOSIS — M54.16 LUMBAR RADICULOPATHY: Primary | ICD-10-CM

## 2024-09-24 ENCOUNTER — TRANSCRIBE ORDERS (OUTPATIENT)
Dept: ADMINISTRATIVE | Facility: HOSPITAL | Age: 84
End: 2024-09-24
Payer: MEDICARE

## 2024-09-24 DIAGNOSIS — M54.16 LUMBAR RADICULOPATHY: Primary | ICD-10-CM

## 2024-09-24 DIAGNOSIS — M25.552 LEFT HIP PAIN: ICD-10-CM

## 2024-10-10 ENCOUNTER — HOSPITAL ENCOUNTER (EMERGENCY)
Facility: HOSPITAL | Age: 84
Discharge: HOME | End: 2024-10-10
Attending: EMERGENCY MEDICINE
Payer: MEDICARE

## 2024-10-10 VITALS
DIASTOLIC BLOOD PRESSURE: 77 MMHG | HEIGHT: 60 IN | HEART RATE: 67 BPM | WEIGHT: 214.29 LBS | TEMPERATURE: 98.2 F | SYSTOLIC BLOOD PRESSURE: 149 MMHG | OXYGEN SATURATION: 97 % | RESPIRATION RATE: 18 BRPM | BODY MASS INDEX: 42.07 KG/M2

## 2024-10-10 DIAGNOSIS — L30.9 DERMATITIS: ICD-10-CM

## 2024-10-10 DIAGNOSIS — R21 FACIAL RASH: Primary | ICD-10-CM

## 2024-10-10 PROCEDURE — 99283 EMERGENCY DEPT VISIT LOW MDM: CPT

## 2024-10-10 NOTE — ED PROVIDER NOTES
Time: 12:01 PM EDT  Date of encounter:  10/10/2024  Independent Historian/Clinical History and Information was obtained by:   Patient    History is limited by: N/A    Chief Complaint: facial rash      History of Present Illness:  Patient is a 84 y.o. year old female who presents to the emergency department for evaluation of facial rash.  She recently received an injection for back pain and then developed a facial rash.  She has no fever, no difficulty swallowing or breathing and no other rash.  She has had no other exposure to medications or topical treatments      Patient Care Team  Primary Care Provider: Cindy Sla MD    Past Medical History:     Allergies   Allergen Reactions    Aricept [Donepezil] Unknown - High Severity    Diphenhydramine Unknown - Low Severity    Exenatide GI Intolerance    Lisinopril Swelling    Nsaids Unknown - High Severity and Unknown - Low Severity    Pseudoephedrine Swelling     Past Medical History:   Diagnosis Date    Asthma     Atrial fibrillation     Atrial fibrillation     Back pain     Breast cancer     Cancer     breast    CHF (congestive heart failure)     Cirrhosis of liver not due to alcohol     Diabetes mellitus     GERD (gastroesophageal reflux disease)     Hypertension     Kidney stone     Neuropathy     Sleep apnea     CPAP     Past Surgical History:   Procedure Laterality Date     SECTION      CHOLECYSTECTOMY      COLONOSCOPY N/A 2022    Procedure: COLONOSCOPY WITH HOT SNARE PIECE MEAL POLYPECTOMY, ORISE INJECTION;  Surgeon: Pierre Delong MD;  Location: [facility] ENDOSCOPY;  Service: Gastroenterology;  Laterality: N/A;  COLON POLYPS    COLONOSCOPY N/A 2022    Procedure: COLONOSCOPY WITH POLYPECTOMY, CAUTERY;  Surgeon: Pierre Delong MD;  Location: [facility] ENDOSCOPY;  Service: Gastroenterology;  Laterality: N/A;  COLON POLYP    COLONOSCOPY N/A 2023    Procedure: COLONOSCOPY WITH HOT SNARE POLYPECTOMY;  Surgeon: Pierre Delong  MD;  Location: [facility] ENDOSCOPY;  Service: Gastroenterology;  Laterality: N/A;  HEMORRHOID, COLON POLYP    ENDOSCOPY N/A 02/08/2022    Procedure: ESOPHAGOGASTRODUODENOSCOPY WITH HOT SNARE POLYPECTOMY, BIOPSIES, ORISE INJECTION AND CLIP APPLICATION X1;  Surgeon: Pierre Delong MD;  Location: [facility] ENDOSCOPY;  Service: Gastroenterology;  Laterality: N/A;  GASTRIC POLYP AND HIATAL HERNIA    EYE SURGERY Bilateral     cataracts    HERNIA REPAIR      inguinal    HYSTERECTOMY      KIDNEY STONE SURGERY      MASTECTOMY Bilateral     PACEMAKER IMPLANTATION       Family History   Problem Relation Age of Onset    Colon polyps Father     Colon polyps Other     Malig Hyperthermia Neg Hx        Home Medications:  Prior to Admission medications    Medication Sig Start Date End Date Taking? Authorizing Provider   apixaban (ELIQUIS) 5 MG tablet tablet Take 1 tablet by mouth 2 (Two) Times a Day.    Emergency, Nurse Epic, RN   cetirizine (zyrTEC) 10 MG tablet Take 1 tablet by mouth Daily for 90 days. 8/10/23 9/5/24  Daysi Pizarro APRN   EPINEPHrine (EPIPEN) 0.3 MG/0.3ML solution auto-injector injection     Sherie Blandon MD   fluticasone (FLONASE) 50 MCG/ACT nasal spray     Sherie Blandon MD   fluticasone-salmeterol (ADVAIR HFA) 230-21 MCG/ACT inhaler Inhale 2 puffs 2 (Two) Times a Day.    ProviderSherie MD   FREESTYLE LITE test strip  8/12/22   Sherie Blandon MD   gabapentin (NEURONTIN) 600 MG tablet Take 1 tablet by mouth 2 (Two) Times a Day.    Sherie Blandon MD   hydrALAZINE (APRESOLINE) 50 MG tablet     Sherie Blandon MD   ibuprofen (ADVIL,MOTRIN) 800 MG tablet Take 1 tablet by mouth Every 6 (Six) Hours As Needed.    Sherie Blandon MD   insulin glargine (LANTUS, SEMGLEE) 100 UNIT/ML injection Inject 45 Units under the skin into the appropriate area as directed Every Night.    Sherie Blandon MD   ipratropium (Atrovent HFA) 17 MCG/ACT inhaler     Barber  MD Sherie   levalbuterol (XOPENEX HFA) 45 MCG/ACT inhaler Inhale 2 puffs Every 4 (Four) Hours As Needed for Wheezing or Shortness of Air for up to 90 days. 5/22/24 9/5/24  Daysi Pizarro APRN   metoprolol succinate XL (TOPROL-XL) 25 MG 24 hr tablet Take 3 tablets by mouth Every 12 (Twelve) Hours for 30 days.  Patient taking differently: Take 2 tablets by mouth Every 12 (Twelve) Hours. 2/22/24 9/5/24  Mustapha Duff MD   montelukast (SINGULAIR) 10 MG tablet Take 1 tablet by mouth Every Night for 90 days. 9/5/24 12/4/24  Daysi Pizarro APRN   NovoLOG FlexPen 100 UNIT/ML solution pen-injector sc pen Inject 20 Units under the skin into the appropriate area as directed 3 (Three) Times a Day With Meals. 11/7/23   Sherie Blandon MD   olmesartan (BENICAR) 40 MG tablet Take 10 mg by mouth Daily.    Emergency, Nurse Epic, RN   pantoprazole (PROTONIX) 40 MG EC tablet Take 1 tablet by mouth Daily.    ProviderSherie MD   sodium chloride 0.65 % nasal spray Every 6 (Six) Hours. 6/10/24   Sherie Blandon MD   spironolactone-hydrochlorothiazide (ALDACTAZIDE) 25-25 MG tablet Take 1 tablet by mouth Daily. 10/11/21   Sherie Blandon MD   Trulicity 3 MG/0.5ML solution pen-injector Inject 0.5 mL under the skin into the appropriate area as directed 1 (One) Time Per Week. THURSDAYS 2/7/24   Sherie Blandon MD   Umeclidinium Bromide (Incruse Ellipta) 62.5 MCG/ACT aerosol powder  Inhale 1 puff Daily.    ProviderSherie MD        Social History:   Social History     Tobacco Use    Smoking status: Never     Passive exposure: Past    Smokeless tobacco: Never   Vaping Use    Vaping status: Never Used   Substance Use Topics    Alcohol use: Never    Drug use: Never         Review of Systems:  Review of Systems   Constitutional:  Negative for chills and fever.   HENT:  Negative for congestion, ear pain and sore throat.    Eyes:  Negative for pain.   Respiratory:  Negative for cough, chest tightness  "and shortness of breath.    Cardiovascular:  Negative for chest pain.   Gastrointestinal:  Negative for abdominal pain, diarrhea, nausea and vomiting.   Genitourinary:  Negative for flank pain and hematuria.   Musculoskeletal:  Negative for joint swelling.   Skin:  Positive for rash. Negative for pallor.   Neurological:  Negative for seizures and headaches.   All other systems reviewed and are negative.       Physical Exam:  /77 (BP Location: Right arm, Patient Position: Lying)   Pulse 67   Temp 98.2 °F (36.8 °C) (Oral)   Resp 18   Ht 152.4 cm (60\")   Wt 97.2 kg (214 lb 4.6 oz)   SpO2 97%   BMI 41.85 kg/m²     Physical Exam  Vitals and nursing note reviewed.   Constitutional:       General: She is not in acute distress.     Appearance: Normal appearance. She is not toxic-appearing.   HENT:      Head: Normocephalic and atraumatic.      Mouth/Throat:      Mouth: Mucous membranes are moist.   Eyes:      General: No scleral icterus.  Cardiovascular:      Rate and Rhythm: Normal rate and regular rhythm.      Pulses: Normal pulses.      Heart sounds: Normal heart sounds.   Pulmonary:      Effort: Pulmonary effort is normal. No respiratory distress.      Breath sounds: Normal breath sounds.   Abdominal:      General: Abdomen is flat.      Palpations: Abdomen is soft.      Tenderness: There is no abdominal tenderness.   Musculoskeletal:         General: Normal range of motion.      Cervical back: Normal range of motion and neck supple.   Skin:     General: Skin is warm and dry.      Findings: Rash present.      Comments: Facial rash   Neurological:      Mental Status: She is alert and oriented to person, place, and time. Mental status is at baseline.                  Procedures:  Procedures      Medical Decision Making:      Comorbidities that affect care:    None    External Notes reviewed:    Previous Clinic Note: PCP visit for anemia      The following orders were placed and all results were independently " analyzed by me:  No orders of the defined types were placed in this encounter.      Medications Given in the Emergency Department:  Medications - No data to display     ED Course:         Labs:    Lab Results (last 24 hours)       ** No results found for the last 24 hours. **             Imaging:    No Radiology Exams Resulted Within Past 24 Hours      Differential Diagnosis and Discussion:    Rash: Differential diagnosis includes but is not limited to sepsis, cellulitis, [place] Spotted Fever, meningitis, meningococcemia, Varicella, Strep infection, dermatitis, allergic reaction, Lyme disease, and toxic shock syndrome.        MDM                     Patient Care Considerations:    LABS: I considered ordering labs, however the patient has no systemic symptoms      Consultants/Shared Management Plan:    None    Social Determinants of Health:    Patient is independent, reliable, and has access to care.       Disposition and Care Coordination:    Discharged: The patient is suitable and stable for discharge with no need for consideration of admission.    I have explained discharge medications and the need for follow up with the patient/caretakers. This was also printed in the discharge instructions. Patient was discharged with the following medications and follow up:      Medication List        Changed      metoprolol succinate XL 25 MG 24 hr tablet  Commonly known as: TOPROL-XL  Take 3 tablets by mouth Every 12 (Twelve) Hours for 30 days.  What changed: how much to take           Cindy Sal MD  [address] [facility] [address] [phone]    On 10/21/2024  If no better.       Final diagnoses:   Facial rash   Dermatitis        ED Disposition       ED Disposition   Discharge    Condition   Stable    Comment   --               This medical record created using voice recognition software.             Dylan Mustafa DO  10/11/24 0956

## 2024-10-10 NOTE — DISCHARGE INSTRUCTIONS
Apply cool compresses to the affected area twice daily.  Return for sick symptoms.  Follow-up your doctor in 2 days if no better.

## 2024-10-29 ENCOUNTER — HOSPITAL ENCOUNTER (OUTPATIENT)
Dept: MRI IMAGING | Facility: HOSPITAL | Age: 84
Discharge: HOME | End: 2024-10-29
Payer: MEDICARE

## 2024-10-29 VITALS — SYSTOLIC BLOOD PRESSURE: 123 MMHG | OXYGEN SATURATION: 96 % | DIASTOLIC BLOOD PRESSURE: 65 MMHG | HEART RATE: 64 BPM

## 2024-10-29 DIAGNOSIS — M25.552 LEFT HIP PAIN: ICD-10-CM

## 2024-10-29 DIAGNOSIS — M54.16 LUMBAR RADICULOPATHY: ICD-10-CM

## 2024-10-29 LAB
CREAT BLDA-MCNC: 1.6 MG/DL (ref 0.6–1.3)
EGFRCR SERPLBLD CKD-EPI 2021: 31.3 ML/MIN/1.73

## 2024-10-29 PROCEDURE — 73721 MRI JNT OF LWR EXTRE W/O DYE: CPT

## 2024-10-29 PROCEDURE — 82565 ASSAY OF CREATININE: CPT

## 2024-10-29 PROCEDURE — 72148 MRI LUMBAR SPINE W/O DYE: CPT

## 2024-11-25 ENCOUNTER — LAB (OUTPATIENT)
Dept: LAB | Facility: HOSPITAL | Age: 84
End: 2024-11-25
Payer: MEDICARE

## 2024-11-25 ENCOUNTER — TRANSCRIBE ORDERS (OUTPATIENT)
Dept: LAB | Facility: HOSPITAL | Age: 84
End: 2024-11-25
Payer: MEDICARE

## 2024-11-25 DIAGNOSIS — E11.22 TYPE 2 DIABETES MELLITUS WITH DIABETIC CHRONIC KIDNEY DISEASE, UNSPECIFIED CKD STAGE, UNSPECIFIED WHETHER LONG TERM INSULIN USE: ICD-10-CM

## 2024-11-25 DIAGNOSIS — N18.32 CHRONIC KIDNEY DISEASE (CKD) STAGE G3B/A1, MODERATELY DECREASED GLOMERULAR FILTRATION RATE (GFR) BETWEEN 30-44 ML/MIN/1.73 SQUARE METER AND ALBUMINURIA CREATININE RATIO LESS THAN 30 MG/G (CMS/H*: Primary | ICD-10-CM

## 2024-11-25 DIAGNOSIS — N18.32 CHRONIC KIDNEY DISEASE (CKD) STAGE G3B/A1, MODERATELY DECREASED GLOMERULAR FILTRATION RATE (GFR) BETWEEN 30-44 ML/MIN/1.73 SQUARE METER AND ALBUMINURIA CREATININE RATIO LESS THAN 30 MG/G (CMS/H*: ICD-10-CM

## 2024-11-25 DIAGNOSIS — E55.9 VITAMIN D DEFICIENCY, UNSPECIFIED: ICD-10-CM

## 2024-11-25 LAB
25(OH)D3 SERPL-MCNC: 55.7 NG/ML (ref 30–100)
ALBUMIN SERPL-MCNC: 3.8 G/DL (ref 3.5–5.2)
ANION GAP SERPL CALCULATED.3IONS-SCNC: 8.3 MMOL/L (ref 5–15)
BACTERIA UR QL AUTO: NORMAL /HPF
BASOPHILS # BLD AUTO: 0.03 10*3/MM3 (ref 0–0.2)
BASOPHILS NFR BLD AUTO: 0.5 % (ref 0–1.5)
BILIRUB UR QL STRIP: NEGATIVE
BUN SERPL-MCNC: 16 MG/DL (ref 8–23)
BUN/CREAT SERPL: 11.6 (ref 7–25)
CALCIUM SPEC-SCNC: 9.1 MG/DL (ref 8.6–10.5)
CHLORIDE SERPL-SCNC: 102 MMOL/L (ref 98–107)
CLARITY UR: ABNORMAL
CO2 SERPL-SCNC: 26.7 MMOL/L (ref 22–29)
COLOR UR: YELLOW
CREAT SERPL-MCNC: 1.38 MG/DL (ref 0.57–1)
CREAT UR-MCNC: 50.8 MG/DL
DEPRECATED RDW RBC AUTO: 44.6 FL (ref 37–54)
EGFRCR SERPLBLD CKD-EPI 2021: 37.4 ML/MIN/1.73
EOSINOPHIL # BLD AUTO: 0.23 10*3/MM3 (ref 0–0.4)
EOSINOPHIL NFR BLD AUTO: 4 % (ref 0.3–6.2)
ERYTHROCYTE [DISTWIDTH] IN BLOOD BY AUTOMATED COUNT: 13.6 % (ref 12.3–15.4)
GLUCOSE SERPL-MCNC: 128 MG/DL (ref 65–99)
GLUCOSE UR STRIP-MCNC: NEGATIVE MG/DL
HCT VFR BLD AUTO: 42.2 % (ref 34–46.6)
HGB BLD-MCNC: 13.9 G/DL (ref 12–15.9)
HGB UR QL STRIP.AUTO: NEGATIVE
HYALINE CASTS UR QL AUTO: NORMAL /LPF
IMM GRANULOCYTES # BLD AUTO: 0.01 10*3/MM3 (ref 0–0.05)
IMM GRANULOCYTES NFR BLD AUTO: 0.2 % (ref 0–0.5)
KETONES UR QL STRIP: NEGATIVE
LEUKOCYTE ESTERASE UR QL STRIP.AUTO: ABNORMAL
LYMPHOCYTES # BLD AUTO: 1.53 10*3/MM3 (ref 0.7–3.1)
LYMPHOCYTES NFR BLD AUTO: 26.5 % (ref 19.6–45.3)
MCH RBC QN AUTO: 29.8 PG (ref 26.6–33)
MCHC RBC AUTO-ENTMCNC: 32.9 G/DL (ref 31.5–35.7)
MCV RBC AUTO: 90.4 FL (ref 79–97)
MONOCYTES # BLD AUTO: 0.55 10*3/MM3 (ref 0.1–0.9)
MONOCYTES NFR BLD AUTO: 9.5 % (ref 5–12)
NEUTROPHILS NFR BLD AUTO: 3.43 10*3/MM3 (ref 1.7–7)
NEUTROPHILS NFR BLD AUTO: 59.3 % (ref 42.7–76)
NITRITE UR QL STRIP: NEGATIVE
NRBC BLD AUTO-RTO: 0 /100 WBC (ref 0–0.2)
PH UR STRIP.AUTO: 7.5 [PH] (ref 5–8)
PHOSPHATE SERPL-MCNC: 3.2 MG/DL (ref 2.5–4.5)
PLATELET # BLD AUTO: 204 10*3/MM3 (ref 140–450)
PMV BLD AUTO: 11.3 FL (ref 6–12)
POTASSIUM SERPL-SCNC: 4.3 MMOL/L (ref 3.5–5.2)
PROT ?TM UR-MCNC: 10.4 MG/DL
PROT UR QL STRIP: NEGATIVE
PROT/CREAT UR: 0.2 MG/G{CREAT}
PTH-INTACT SERPL-MCNC: 43.3 PG/ML (ref 15–65)
RBC # BLD AUTO: 4.67 10*6/MM3 (ref 3.77–5.28)
RBC # UR STRIP: NORMAL /HPF
REF LAB TEST METHOD: NORMAL
SODIUM SERPL-SCNC: 137 MMOL/L (ref 136–145)
SP GR UR STRIP: 1.01 (ref 1–1.03)
SQUAMOUS #/AREA URNS HPF: NORMAL /HPF
UROBILINOGEN UR QL STRIP: ABNORMAL
WBC # UR STRIP: NORMAL /HPF
WBC NRBC COR # BLD AUTO: 5.78 10*3/MM3 (ref 3.4–10.8)

## 2024-11-25 PROCEDURE — 83970 ASSAY OF PARATHORMONE: CPT

## 2024-11-25 PROCEDURE — 80069 RENAL FUNCTION PANEL: CPT

## 2024-11-25 PROCEDURE — 85025 COMPLETE CBC W/AUTO DIFF WBC: CPT

## 2024-11-25 PROCEDURE — 36415 COLL VENOUS BLD VENIPUNCTURE: CPT

## 2024-11-25 PROCEDURE — 82570 ASSAY OF URINE CREATININE: CPT

## 2024-11-25 PROCEDURE — 84156 ASSAY OF PROTEIN URINE: CPT

## 2024-11-25 PROCEDURE — 81001 URINALYSIS AUTO W/SCOPE: CPT

## 2024-11-25 PROCEDURE — 82306 VITAMIN D 25 HYDROXY: CPT

## 2024-11-26 ENCOUNTER — LAB (OUTPATIENT)
Dept: LAB | Facility: HOSPITAL | Age: 84
End: 2024-11-26
Payer: MEDICARE

## 2024-11-26 LAB — HOLD SPECIMEN: NORMAL

## 2024-12-04 ENCOUNTER — OFFICE VISIT (OUTPATIENT)
Dept: OTOLARYNGOLOGY | Facility: CLINIC | Age: 84
End: 2024-12-04
Payer: MEDICARE

## 2024-12-04 VITALS — WEIGHT: 211.6 LBS | TEMPERATURE: 97.5 F | BODY MASS INDEX: 41.54 KG/M2 | HEIGHT: 60 IN

## 2024-12-04 DIAGNOSIS — T50.905A MEDICATION INDUCED COAGULOPATHY: ICD-10-CM

## 2024-12-04 DIAGNOSIS — E04.2 MULTINODULAR GOITER: Primary | ICD-10-CM

## 2024-12-04 DIAGNOSIS — D68.9 MEDICATION INDUCED COAGULOPATHY: ICD-10-CM

## 2024-12-04 DIAGNOSIS — H90.3 BILATERAL SENSORINEURAL HEARING LOSS: ICD-10-CM

## 2024-12-04 DIAGNOSIS — R04.0 EPISTAXIS: ICD-10-CM

## 2024-12-04 RX ORDER — LANCETS 28 GAUGE
EACH MISCELLANEOUS
COMMUNITY
Start: 2024-10-09

## 2024-12-04 RX ORDER — CALCIUM CARBONATE/VITAMIN D3 600 MG-10
TABLET ORAL EVERY 12 HOURS SCHEDULED
COMMUNITY
Start: 2024-11-14

## 2024-12-04 RX ORDER — IBANDRONATE SODIUM 150 MG/1
TABLET, FILM COATED ORAL
COMMUNITY
Start: 2024-11-14

## 2024-12-04 RX ORDER — FLURBIPROFEN SODIUM 0.3 MG/ML
SOLUTION/ DROPS OPHTHALMIC
COMMUNITY
Start: 2024-10-09

## 2024-12-04 RX ORDER — SEMAGLUTIDE 0.68 MG/ML
INJECTION, SOLUTION SUBCUTANEOUS
COMMUNITY
Start: 2024-11-14

## 2024-12-04 RX ORDER — ERGOCALCIFEROL 1.25 MG/1
CAPSULE, LIQUID FILLED ORAL
COMMUNITY
Start: 2024-10-02

## 2024-12-04 NOTE — PROGRESS NOTES
Patient Name: Bella Ritter   Visit Date: 12/04/2024   Patient ID: [id number]  Provider: Anurag Galindo MD    Sex: female  Location: [facility]   YOB: 1940  Location Address: [address] [place] [address], [address] [place] [address],?[place]?[id number]    Primary Care Provider Cindy Sal MD  Location Phone: [phone]    Referring Provider: No ref. provider found        Chief Complaint  Nose Bleed, Thyroid Nodule , and SAW JADA RAMIREZ IN 2022    History of Present Illness  Bella Ritter is a 84 y.o. female who presents to [facility] for Nose Bleed, Thyroid Nodule , and SAW JADA RAMIREZ IN 2022.   Patient was last seen by Dr. Galindo on 10/30/2020 at [facility] for multinodular goiter with fatigue, hair loss, and exacerbation of atrial fibrillation and mitral regurgitation with intermittent palpitations.  Patient also noted to have sudden right hearing loss which upon audiogram on 10/29/2020 showed SRT of 35 on the right and 40 on the left with 92% discrimination score bilaterally.  Patient had bilateral mild to moderate sensorineural hearing loss.  At that time patient was recommended further evaluation of the right thyroid nodule with ultrasound.  Patient also had been on Eliquis due to history of PE and DVT.  Patient was last seen by Ms. Jada ramirez at [facility] clinic on 12/1/2022 with ultrasound of thyroid showing stable multinodular goiter.  Thyroid function tests were normal.  Patient is here to have her thyroid nodule as well as hearing loss further evaluated.  She has frequent nosebleeds but today she has been doing quite well.  Patient is still on Eliquis.  Past Medical History:   Diagnosis Date    Asthma     Atrial fibrillation     Atrial fibrillation     Back pain     Breast cancer     Cancer     breast    CHF (congestive heart failure)     Cirrhosis of liver not due to alcohol     Diabetes mellitus     GERD (gastroesophageal reflux disease)      Hypertension     Kidney stone     Neuropathy     Sleep apnea     CPAP       Past Surgical History:   Procedure Laterality Date     SECTION      CHOLECYSTECTOMY      COLONOSCOPY N/A 2022    Procedure: COLONOSCOPY WITH HOT SNARE PIECE MEAL POLYPECTOMY, ORISE INJECTION;  Surgeon: Pierre Delong MD;  Location: [facility];  Service: Gastroenterology;  Laterality: N/A;  COLON POLYPS    COLONOSCOPY N/A 2022    Procedure: COLONOSCOPY WITH POLYPECTOMY, CAUTERY;  Surgeon: Pierre Delong MD;  Location: [facility] ENDOSCOPY;  Service: Gastroenterology;  Laterality: N/A;  COLON POLYP    COLONOSCOPY N/A 2023    Procedure: COLONOSCOPY WITH HOT SNARE POLYPECTOMY;  Surgeon: Pierre Delong MD;  Location: [facility] ENDOSCOPY;  Service: Gastroenterology;  Laterality: N/A;  HEMORRHOID, COLON POLYP    ENDOSCOPY N/A 2022    Procedure: ESOPHAGOGASTRODUODENOSCOPY WITH HOT SNARE POLYPECTOMY, BIOPSIES, ORISE INJECTION AND CLIP APPLICATION X1;  Surgeon: Pierre Delong MD;  Location: [facility] ENDOSCOPY;  Service: Gastroenterology;  Laterality: N/A;  GASTRIC POLYP AND HIATAL HERNIA    EYE SURGERY Bilateral     cataracts    HERNIA REPAIR      inguinal    HYSTERECTOMY      KIDNEY STONE SURGERY      MASTECTOMY Bilateral     PACEMAKER IMPLANTATION           Current Outpatient Medications:     apixaban (ELIQUIS) 5 MG tablet tablet, Take 1 tablet by mouth 2 (Two) Times a Day., Disp: , Rfl:     B-D UF III MINI PEN NEEDLES 31G X 5 MM misc, , Disp: , Rfl:     calcium carb-cholecalciferol 600-10 MG-MCG tablet per tablet, Every 12 (Twelve) Hours., Disp: , Rfl:     cetirizine (zyrTEC) 10 MG tablet, Take 1 tablet by mouth Daily for 90 days., Disp: 90 tablet, Rfl: 3    FREESTYLE LITE test strip, , Disp: , Rfl:     gabapentin (NEURONTIN) 600 MG tablet, Take 1 tablet by mouth 2 (Two) Times a Day., Disp: , Rfl:     ibandronate (BONIVA) 150 MG tablet, , Disp: , Rfl:     insulin glargine (LANTUS, SEMGLEE)  100 UNIT/ML injection, Inject 45 Units under the skin into the appropriate area as directed Every Night., Disp: , Rfl:     Lancets (freestyle) lancets, , Disp: , Rfl:     metoprolol succinate XL (TOPROL-XL) 25 MG 24 hr tablet, Take 3 tablets by mouth Every 12 (Twelve) Hours for 30 days. (Patient taking differently: Take 2 tablets by mouth Every 12 (Twelve) Hours.), Disp: 180 tablet, Rfl: 0    montelukast (SINGULAIR) 10 MG tablet, Take 1 tablet by mouth Every Night for 90 days., Disp: 90 tablet, Rfl: 3    NovoLOG FlexPen 100 UNIT/ML solution pen-injector sc pen, Inject 20 Units under the skin into the appropriate area as directed 3 (Three) Times a Day With Meals., Disp: , Rfl:     olmesartan (BENICAR) 40 MG tablet, Take 10 mg by mouth Daily., Disp: , Rfl:     Ozempic, 0.25 or 0.5 MG/DOSE, 2 MG/3ML solution pen-injector, 0.5 mg subcutaneously once a week for 28 days, Disp: , Rfl:     sodium chloride 0.65 % nasal spray, Every 6 (Six) Hours., Disp: , Rfl:     spironolactone-hydrochlorothiazide (ALDACTAZIDE) 25-25 MG tablet, Take 1 tablet by mouth Daily., Disp: , Rfl:     Umeclidinium Bromide (Incruse Ellipta) 62.5 MCG/ACT aerosol powder , Inhale 1 puff Daily., Disp: , Rfl:     EPINEPHrine (EPIPEN) 0.3 MG/0.3ML solution auto-injector injection, , Disp: , Rfl:     fluticasone (FLONASE) 50 MCG/ACT nasal spray, , Disp: , Rfl:     fluticasone-salmeterol (ADVAIR HFA) 230-21 MCG/ACT inhaler, Inhale 2 puffs 2 (Two) Times a Day. (Patient not taking: Reported on 12/4/2024), Disp: , Rfl:     hydrALAZINE (APRESOLINE) 50 MG tablet, , Disp: , Rfl:     ibuprofen (ADVIL,MOTRIN) 800 MG tablet, Take 1 tablet by mouth Every 6 (Six) Hours As Needed. (Patient not taking: Reported on 12/4/2024), Disp: , Rfl:     ipratropium (Atrovent HFA) 17 MCG/ACT inhaler, , Disp: , Rfl:     levalbuterol (XOPENEX HFA) 45 MCG/ACT inhaler, Inhale 2 puffs Every 4 (Four) Hours As Needed for Wheezing or Shortness of Air for up to 90 days., Disp: 3 each, Rfl:  "3    pantoprazole (PROTONIX) 40 MG EC tablet, Take 1 tablet by mouth Daily. (Patient not taking: Reported on 12/4/2024), Disp: , Rfl:     Trulicity 3 MG/0.5ML solution pen-injector, Inject 0.5 mL under the skin into the appropriate area as directed 1 (One) Time Per Week. THURSDAYS (Patient not taking: Reported on 12/4/2024), Disp: , Rfl:     vitamin D (ERGOCALCIFEROL) 1.25 MG (51958 UT) capsule capsule, , Disp: , Rfl:      Allergies   Allergen Reactions    Aricept [Donepezil] Unknown - High Severity    Lisinopril Swelling    Pseudoephedrine Swelling    Nsaids Unknown - High Severity and Unknown - Low Severity    Diphenhydramine Unknown - Low Severity    Exenatide GI Intolerance       Social History     Tobacco Use    Smoking status: Never     Passive exposure: Past    Smokeless tobacco: Never   Vaping Use    Vaping status: Never Used   Substance Use Topics    Alcohol use: Never    Drug use: Never        Objective     Vital Signs:   Vitals:    12/04/24 1322   Temp: 97.5 °F (36.4 °C)   TempSrc: Temporal   Weight: 96 kg (211 lb 9.6 oz)   Height: 152.4 cm (60\")       Tobacco Use: Low Risk  (12/4/2024)    Patient History     Smoking Tobacco Use: Never     Smokeless Tobacco Use: Never     Passive Exposure: Past         Physical Exam    Constitutional   Appearance  well developed, well-nourished, alert and in no acute distress, voice clear and strong    Head   Inspection  no deformities or lesions      Face   Inspection  no facial lesions; House-Brackmann I/VI bilaterally   Palpation  no TMJ crepitus nor  muscle tenderness bilaterally     Eyes/Vision   Visual Fields  extraocular movements are intact, no spontaneous or gaze-induced nystagmus  Conjunctivae  clear   Sclerae  clear   Pupils and Irises  pupils equal, round, and reactive to light.   Nystagmus  not present     Ears, Nose, Mouth and Throat  Ears  External Ears  appearance within normal limits, no lesions present   Otoscopic Examination  tympanic membrane " appearance within normal limits bilaterally without perforations, well-aerated middle ears   Hearing  intact to conversational voice both ears   Tunning fork testing    Rinne:  Daniels:    Nose  External Nose  appearance normal   Intranasal Exam  mucosa within normal limits, vestibules normal, no intranasal lesions present, septum midline, sinuses non tender to percussion, otherwise dry mucosa  Modified [place] Test:    Oral Cavity  Oral Mucosa  oral mucosa normal without pallor or cyanosis   Lips  lip appearance normal   Teeth  normal dentition for age   Gums  gums pink, non-swollen, no bleeding present   Tongue  tongue appearance normal; normal mobility   Palate  hard palate normal, soft palate appearance normal with symmetric mobility     Throat  Oropharynx  no inflammation or lesions present, tonsils within normal limits   Hypopharynx  appearance within normal limits   Larynx  voice normal     Neck  Inspection/Palpation  normal appearance, no masses or tenderness, trachea midline; thyroid size normal, nontender, no nodules or masses present on palpation     Lymphatic  Neck  no lymphadenopathy present   Supraclavicular Nodes  no lymphadenopathy present   Preauricular Nodes  no lymphadenopathy present     Respiratory  Respiratory Effort  breathing unlabored   Inspection of Chest  normal appearance, no retractions     Musculoskeletal   Cervical back: Normal range of motion and neck supple.      Skin and Subcutaneous Tissue  General Inspection  Regarding face and neck - there are no rashes present, no lesions present, and no areas of discoloration     Neurologic  Cranial Nerves  cranial nerves II-XII are grossly intact bilaterally   Gait and Station  normal gait, able to stand without diffculty    Psychiatric  Judgement and Insight  judgment and insight intact   Mood and Affect  mood normal, affect appropriate       RESULTS REVIEWED    I have reviewed the following information:   [x]  Previous Internal Note  [x]   Previous External Note:   [x]  Ordered Tests & Results:      Pathology:   Lab Results   Component Value Date    Microalbumin <12.0 10/11/2019    Microalbumin/Creatinine Ratio 41.1 (H) 10/11/2019       TSH   Date Value Ref Range Status   02/20/2024 0.522 0.270 - 4.200 uIU/mL Final     Free T4   Date Value Ref Range Status   11/07/2022 1.03 0.93 - 1.70 ng/dL Final     PTH, Intact   Date Value Ref Range Status   11/25/2024 43.3 15.0 - 65.0 pg/mL Final     Calcium   Date Value Ref Range Status   11/25/2024 9.1 8.6 - 10.5 mg/dL Final     25 Hydroxy, Vitamin D   Date Value Ref Range Status   11/25/2024 55.7 30.0 - 100.0 ng/ml Final       MRI Lumbar Spine Without Contrast    Result Date: 10/29/2024  Impression: Multilevel degenerative changes are present as described above. There is moderate left neural foraminal narrowing and abutment of the exiting left L5 nerve root at L5-S1 due to a a left lateral disc osteophyte complex, unchanged from prior study.. No central canal stenosis. No significant change since the prior study. Electronically Signed: Petey Solorzano DO  10/29/2024 2:35 PM EDT  Workstation ID: [id number]    MRI Hip Left Without Contrast    Result Date: 10/29/2024  No acute findings on MRI. There is mild arthritis of the hip. There is facet degenerative change and disc desiccation lumbar spine. Please see MRI lumbar spine performed same date. Electronically Signed: Tasneem Slade MD  10/29/2024 2:30 PM EDT  Workstation ID: [id number]    XR Chest 1 View    Result Date: 8/8/2024  Stable cardiac enlargement. Mild bibasilar atelectasis. Electronically Signed: Aleksandr Love MD  8/8/2024 2:54 AM EDT  Workstation ID: [id number]      I have discussed the interpretation of the above results with the patient.    Procedures          Assessment and Plan   Diagnoses and all orders for this visit:    1. Multinodular goiter (Primary)  -     US Thyroid; Future    2. Bilateral sensorineural hearing loss  -     Comprehensive Hearing  Test; Future    3. Epistaxis    4. Medication induced coagulopathy        (E04.2) Multinodular goiter - Plan: US Thyroid    (H90.3) Bilateral sensorineural hearing loss - Plan: Comprehensive Hearing Test    (R04.0) Epistaxis    (D68.9,  T50.905A) Medication induced coagulopathy     Bella Ritter  reports that she has never smoked. She has been exposed to tobacco smoke. She has never used smokeless tobacco.         Plan:  Patient Instructions   1.  Since patient was lost to follow-up for few years I recommend proceeding with audiogram and ultrasound of thyroid.  Recent thyroid function tests have been normal last month.  2.  Regarding epistaxis I recommended patient to prevent any nasal manipulation and also to keep a humidifier next to her bed.  3.  I will see patient after audiogram and ultrasound of thyroid.       Follow Up   Return in about 1 month (around 1/4/2025), or Follow-up with ultrasound and audiogram.  Patient was given instructions and counseling regarding her condition or for health maintenance advice. Please see specific information pulled into the AVS if appropriate.

## 2024-12-04 NOTE — PATIENT INSTRUCTIONS
1.  Since patient was lost to follow-up for few years I recommend proceeding with audiogram and ultrasound of thyroid.  Recent thyroid function tests have been normal last month.  2.  Regarding epistaxis I recommended patient to prevent any nasal manipulation and also to keep a humidifier next to her bed.  3.  I will see patient after audiogram and ultrasound of thyroid.

## 2024-12-13 ENCOUNTER — TELEPHONE (OUTPATIENT)
Dept: OTOLARYNGOLOGY | Facility: CLINIC | Age: 84
End: 2024-12-13
Payer: MEDICARE

## 2024-12-13 NOTE — TELEPHONE ENCOUNTER
Left message with patient asking for a call back to schedule follow-up appointment with Dr. Galindo. Told her to ask for me.    Nsaids Counseling: NSAID Counseling: I discussed with the patient that NSAIDs should be taken with food. Prolonged use of NSAIDs can result in the development of stomach ulcers.  Patient advised to stop taking NSAIDs if abdominal pain occurs.  The patient verbalized understanding of the proper use and possible adverse effects of NSAIDs.  All of the patient's questions and concerns were addressed.

## 2024-12-17 ENCOUNTER — HOSPITAL ENCOUNTER (OUTPATIENT)
Dept: ULTRASOUND IMAGING | Facility: HOSPITAL | Age: 84
Discharge: HOME | End: 2024-12-17
Admitting: OTOLARYNGOLOGY
Payer: MEDICARE

## 2024-12-17 DIAGNOSIS — E04.2 MULTINODULAR GOITER: ICD-10-CM

## 2024-12-17 PROCEDURE — 76536 US EXAM OF HEAD AND NECK: CPT

## 2024-12-19 ENCOUNTER — OFFICE VISIT (OUTPATIENT)
Dept: OTOLARYNGOLOGY | Facility: CLINIC | Age: 84
End: 2024-12-19
Payer: MEDICARE

## 2024-12-19 ENCOUNTER — PROCEDURE VISIT (OUTPATIENT)
Dept: OTOLARYNGOLOGY | Facility: CLINIC | Age: 84
End: 2024-12-19
Payer: MEDICARE

## 2024-12-19 VITALS — TEMPERATURE: 97.8 F | WEIGHT: 208.2 LBS | BODY MASS INDEX: 40.88 KG/M2 | HEIGHT: 60 IN

## 2024-12-19 DIAGNOSIS — H90.A21 SENSORINEURAL HEARING LOSS (SNHL) OF RIGHT EAR WITH RESTRICTED HEARING OF LEFT EAR: Primary | ICD-10-CM

## 2024-12-19 DIAGNOSIS — E04.2 MULTINODULAR GOITER: ICD-10-CM

## 2024-12-19 DIAGNOSIS — H90.A22 SENSORINEURAL HEARING LOSS (SNHL) OF LEFT EAR WITH RESTRICTED HEARING OF RIGHT EAR: ICD-10-CM

## 2024-12-19 DIAGNOSIS — H90.3 SENSORINEURAL HEARING LOSS, BILATERAL: ICD-10-CM

## 2024-12-19 DIAGNOSIS — E03.9 HYPOTHYROIDISM (ACQUIRED): Primary | ICD-10-CM

## 2024-12-19 NOTE — PROGRESS NOTES
AUDIOMETRIC EVALUATION      Name:  Bella Ritter  :  1940  Age:  84 y.o.  Date of Evaluation:  2024       History:  Ms. Ritter is seen today for a hearing evaluation due to history of hearing loss.    Audiologic Information:  Concerns for Hearing: Yes  PETs: No  Other otologic surgical history: None  Aural Pressure/Fullness: No  Otalgia: No  Otorrhea: None  Tinnitus: No  Dizziness: No  Noise Exposure: No  Family history of hearing loss: Unknown  Head trauma requiring hospital stay: No  Chemotherapy: No  Other significant history: No    EVALUATION:    See audiogram    RESULTS:    Otoscopic Evaluation:        NOTE: Testing completed before ears were examined by Dr. Galindo.    Tympanometry (226 Hz):  Right: Type A  Left: Type A    IMPRESSIONS:  Pure tone thresholds for the right ear indicates a moderate to profound sensorineural hearing loss.  Pure tone thresholds for the left ear indicates a mild to severe sensorineural hearing loss.  Patient was counseled with regard to the findings.    Amplification needs:  Patient could benefit from amplification if they feel increased communication difficulties.    RECOMMENDATIONS/PLAN:  Follow-up recommendations of Dr. Galindo  Hearing aid evaluation and counseling upon medical clearance and patient motivation. Patient provided contact information for an audiologist in [facility].  Discussed results and recommendations with patient. Questions were addressed and the patient was encouraged to contact our department should concerns arise.          Jesús Rodrigues M.S, [facility]  Licensed Audiologist

## 2024-12-19 NOTE — PROGRESS NOTES
Patient Name: Bella Ritter   Visit Date: 12/19/2024   Patient ID: [id number]  Provider: Anurag Galindo MD    Sex: female  Location: [facility]   YOB: 1940  Location Address: [address] [place] [address], [address] [place] [address],?[place]?[id number]    Primary Care Provider Cindy Sal MD  Location Phone: [phone]    Referring Provider: No ref. provider found        Chief Complaint  1 M F/UP WITH AUDIO AND U/S    History of Present Illness  Bella Ritter is a 84 y.o. female who presents to [facility] for 1 M F/UP WITH AUDIO AND U/S.   Patient was last seen by Dr. Galindo on 10/30/2020 at [facility] for multinodular goiter with fatigue, hair loss, and exacerbation of atrial fibrillation and mitral regurgitation with intermittent palpitations.  Patient also noted to have sudden right hearing loss which upon audiogram on 10/29/2020 showed SRT of 35 on the right and 40 on the left with 92% discrimination score bilaterally.  Patient had bilateral mild to moderate sensorineural hearing loss.  At that time patient was recommended further evaluation of the right thyroid nodule with ultrasound.  Patient also had been on Eliquis due to history of PE and DVT.  Patient was last seen by Ms. Jada sarah at [facility] clinic on 12/1/2022 with ultrasound of thyroid showing stable multinodular goiter.  Thyroid function tests were normal.  Patient is here to have her thyroid nodule as well as hearing loss further evaluated.  She has frequent nosebleeds but today she has been doing quite well.  Patient is still on Eliquis.    Since patient was lost to follow-up for few years I recommend proceeding with audiogram and ultrasound of thyroid.  Recent thyroid function tests have been normal last month.  Regarding epistaxis I recommended patient to prevent any nasal manipulation and also to keep a humidifier next to her bed.  I will see patient after audiogram and  ultrasound of thyroid.  Ultrasound was obtained on 2024 showing,  Impression:  2.1 cm T RADS 4 right thyroid nodule previously measured about 2.9 cm in 2017 and was previously biopsied in 2017.     1.2 cm T RADS 4 left thyroid nodule similar in size to previous exam. This nodule measured up to 1.6 cm on 2017 suggesting long-term stability.  ENT - SCAN - AUDIO 10/29/2020 (2024)   Past Medical History:   Diagnosis Date    Asthma     Atrial fibrillation     Atrial fibrillation     Back pain     Breast cancer     Cancer     breast    CHF (congestive heart failure)     Cirrhosis of liver not due to alcohol     Diabetes mellitus     GERD (gastroesophageal reflux disease)     Hypertension     Kidney stone     Neuropathy     Sleep apnea     CPAP       Past Surgical History:   Procedure Laterality Date     SECTION      CHOLECYSTECTOMY      COLONOSCOPY N/A 2022    Procedure: COLONOSCOPY WITH HOT SNARE PIECE MEAL POLYPECTOMY, ORISE INJECTION;  Surgeon: Pierre Delong MD;  Location: [facility];  Service: Gastroenterology;  Laterality: N/A;  COLON POLYPS    COLONOSCOPY N/A 2022    Procedure: COLONOSCOPY WITH POLYPECTOMY, CAUTERY;  Surgeon: Pierre Delong MD;  Location: [facility];  Service: Gastroenterology;  Laterality: N/A;  COLON POLYP    COLONOSCOPY N/A 2023    Procedure: COLONOSCOPY WITH HOT SNARE POLYPECTOMY;  Surgeon: Pierre Delong MD;  Location: [facility];  Service: Gastroenterology;  Laterality: N/A;  HEMORRHOID, COLON POLYP    ENDOSCOPY N/A 2022    Procedure: ESOPHAGOGASTRODUODENOSCOPY WITH HOT SNARE POLYPECTOMY, BIOPSIES, ORISE INJECTION AND CLIP APPLICATION X1;  Surgeon: Pierre Delong MD;  Location: [facility];  Service: Gastroenterology;  Laterality: N/A;  GASTRIC POLYP AND HIATAL HERNIA    EYE SURGERY Bilateral     cataracts    HERNIA REPAIR      inguinal    HYSTERECTOMY      KIDNEY STONE SURGERY      MASTECTOMY  Bilateral     PACEMAKER IMPLANTATION           Current Outpatient Medications:     apixaban (ELIQUIS) 5 MG tablet tablet, Take 1 tablet by mouth 2 (Two) Times a Day., Disp: , Rfl:     B-D UF III MINI PEN NEEDLES 31G X 5 MM misc, , Disp: , Rfl:     calcium carb-cholecalciferol 600-10 MG-MCG tablet per tablet, Every 12 (Twelve) Hours., Disp: , Rfl:     cetirizine (zyrTEC) 10 MG tablet, Take 1 tablet by mouth Daily for 90 days., Disp: 90 tablet, Rfl: 3    fluticasone (FLONASE) 50 MCG/ACT nasal spray, , Disp: , Rfl:     fluticasone-salmeterol (ADVAIR HFA) 230-21 MCG/ACT inhaler, Inhale 2 puffs 2 (Two) Times a Day., Disp: , Rfl:     FREESTYLE LITE test strip, , Disp: , Rfl:     gabapentin (NEURONTIN) 600 MG tablet, Take 1 tablet by mouth 2 (Two) Times a Day., Disp: , Rfl:     hydrALAZINE (APRESOLINE) 50 MG tablet, , Disp: , Rfl:     ibandronate (BONIVA) 150 MG tablet, , Disp: , Rfl:     ibuprofen (ADVIL,MOTRIN) 800 MG tablet, Take 1 tablet by mouth Every 6 (Six) Hours As Needed., Disp: , Rfl:     insulin glargine (LANTUS, SEMGLEE) 100 UNIT/ML injection, Inject 45 Units under the skin into the appropriate area as directed Every Night., Disp: , Rfl:     ipratropium (Atrovent HFA) 17 MCG/ACT inhaler, , Disp: , Rfl:     Lancets (freestyle) lancets, , Disp: , Rfl:     metoprolol succinate XL (TOPROL-XL) 25 MG 24 hr tablet, Take 3 tablets by mouth Every 12 (Twelve) Hours for 30 days. (Patient taking differently: Take 2 tablets by mouth Every 12 (Twelve) Hours.), Disp: 180 tablet, Rfl: 0    montelukast (SINGULAIR) 10 MG tablet, Take 1 tablet by mouth Every Night for 90 days., Disp: 90 tablet, Rfl: 3    NovoLOG FlexPen 100 UNIT/ML solution pen-injector sc pen, Inject 20 Units under the skin into the appropriate area as directed 3 (Three) Times a Day With Meals., Disp: , Rfl:     olmesartan (BENICAR) 40 MG tablet, Take 10 mg by mouth Daily., Disp: , Rfl:     pantoprazole (PROTONIX) 40 MG EC tablet, Take 1 tablet by mouth Daily.,  "Disp: , Rfl:     sodium chloride 0.65 % nasal spray, Every 6 (Six) Hours., Disp: , Rfl:     spironolactone-hydrochlorothiazide (ALDACTAZIDE) 25-25 MG tablet, Take 1 tablet by mouth Daily., Disp: , Rfl:     Umeclidinium Bromide (Incruse Ellipta) 62.5 MCG/ACT aerosol powder , Inhale 1 puff Daily., Disp: , Rfl:     vitamin D (ERGOCALCIFEROL) 1.25 MG (18246 UT) capsule capsule, , Disp: , Rfl:     EPINEPHrine (EPIPEN) 0.3 MG/0.3ML solution auto-injector injection, , Disp: , Rfl:     levalbuterol (XOPENEX HFA) 45 MCG/ACT inhaler, Inhale 2 puffs Every 4 (Four) Hours As Needed for Wheezing or Shortness of Air for up to 90 days., Disp: 3 each, Rfl: 3    Ozempic, 0.25 or 0.5 MG/DOSE, 2 MG/3ML solution pen-injector, 0.5 mg subcutaneously once a week for 28 days (Patient not taking: Reported on 12/19/2024), Disp: , Rfl:     Trulicity 3 MG/0.5ML solution pen-injector, Inject 0.5 mL under the skin into the appropriate area as directed 1 (One) Time Per Week. THURSDAYS (Patient not taking: Reported on 12/19/2024), Disp: , Rfl:      Allergies   Allergen Reactions    Aricept [Donepezil] Unknown - High Severity    Nsaids Unknown - High Severity and Unknown - Low Severity    Lisinopril Swelling    Pseudoephedrine Swelling    Diphenhydramine Unknown - Low Severity    Exenatide GI Intolerance       Social History     Tobacco Use    Smoking status: Never     Passive exposure: Past    Smokeless tobacco: Never   Vaping Use    Vaping status: Never Used   Substance Use Topics    Alcohol use: Never    Drug use: Never        Objective     Vital Signs:   Vitals:    12/19/24 1325   Temp: 97.8 °F (36.6 °C)   TempSrc: Temporal   Weight: 94.4 kg (208 lb 3.2 oz)   Height: 152.4 cm (60\")       Tobacco Use: Low Risk  (12/19/2024)    Patient History     Smoking Tobacco Use: Never     Smokeless Tobacco Use: Never     Passive Exposure: Past         Physical Exam    Constitutional   Appearance  well developed, well-nourished, alert and in no acute " distress, voice clear and strong    Head   Inspection  no deformities or lesions      Face   Inspection  no facial lesions; House-Brackmann I/VI bilaterally   Palpation  no TMJ crepitus nor  muscle tenderness bilaterally     Eyes/Vision   Visual Fields  extraocular movements are intact, no spontaneous or gaze-induced nystagmus  Conjunctivae  clear   Sclerae  clear   Pupils and Irises  pupils equal, round, and reactive to light.   Nystagmus  not present     Ears, Nose, Mouth and Throat  Ears  External Ears  appearance within normal limits, no lesions present   Otoscopic Examination  tympanic membrane appearance within normal limits bilaterally without perforations, well-aerated middle ears   Hearing  intact to conversational voice both ears   Tunning fork testing    Rinne:  Daniels:    Nose  External Nose  appearance normal   Intranasal Exam  mucosa within normal limits, vestibules normal, no intranasal lesions present, septum midline, sinuses non tender to percussion   Modified Leflore Test:    Oral Cavity  Oral Mucosa  oral mucosa normal without pallor or cyanosis   Lips  lip appearance normal   Teeth  normal dentition for age   Gums  gums pink, non-swollen, no bleeding present   Tongue  tongue appearance normal; normal mobility   Palate  hard palate normal, soft palate appearance normal with symmetric mobility     Throat  Oropharynx  no inflammation or lesions present, tonsils within normal limits   Hypopharynx  appearance within normal limits   Larynx  voice normal     Neck  Inspection/Palpation  normal appearance, no masses or tenderness, trachea midline; thyroid size normal, nontender, no nodules or masses present on palpation     Lymphatic  Neck  no lymphadenopathy present   Supraclavicular Nodes  no lymphadenopathy present   Preauricular Nodes  no lymphadenopathy present     Respiratory  Respiratory Effort  breathing unlabored   Inspection of Chest  normal appearance, no retractions     Musculoskeletal    Cervical back: Normal range of motion and neck supple.      Skin and Subcutaneous Tissue  General Inspection  Regarding face and neck - there are no rashes present, no lesions present, and no areas of discoloration     Neurologic  Cranial Nerves  cranial nerves II-XII are grossly intact bilaterally   Gait and Station  normal gait, able to stand without diffculty    Psychiatric  Judgement and Insight  judgment and insight intact   Mood and Affect  mood normal, affect appropriate       RESULTS REVIEWED    I have reviewed the following information:   [x]  Previous Internal Note  []  Previous External Note:   [x]  Ordered Tests & Results:      Pathology:   Lab Results   Component Value Date    Microalbumin <12.0 10/11/2019    Microalbumin/Creatinine Ratio 41.1 (H) 10/11/2019       TSH   Date Value Ref Range Status   02/20/2024 0.522 0.270 - 4.200 uIU/mL Final     Free T4   Date Value Ref Range Status   11/07/2022 1.03 0.93 - 1.70 ng/dL Final     PTH, Intact   Date Value Ref Range Status   11/25/2024 43.3 15.0 - 65.0 pg/mL Final     Calcium   Date Value Ref Range Status   11/25/2024 9.1 8.6 - 10.5 mg/dL Final     25 Hydroxy, Vitamin D   Date Value Ref Range Status   11/25/2024 55.7 30.0 - 100.0 ng/ml Final       US Thyroid    Result Date: 12/18/2024  Impression: 2.1 cm T RADS 4 right thyroid nodule previously measured about 2.9 cm in 2017 and was previously biopsied in 2017. 1.2 cm T RADS 4 left thyroid nodule similar in size to previous exam. This nodule measured up to 1.6 cm on 4/13/2017 suggesting long-term stability. TI-RADS: TR4 - Size greater than 1.5 cm. Ultrasound guided FNA is recommended. If previously biopsied, recommend correlation with prior biopsy results and follow up ultrasound. Electronically Signed: Shravan Arias MD  12/18/2024 3:49 PM EST  Workstation ID: [id number]    MRI Lumbar Spine Without Contrast    Result Date: 10/29/2024  Impression: Multilevel degenerative changes are present as described  above. There is moderate left neural foraminal narrowing and abutment of the exiting left L5 nerve root at L5-S1 due to a a left lateral disc osteophyte complex, unchanged from prior study.. No central canal stenosis. No significant change since the prior study. Electronically Signed: Petey Solorzano DO  10/29/2024 2:35 PM EDT  Workstation ID: [id number]    MRI Hip Left Without Contrast    Result Date: 10/29/2024  No acute findings on MRI. There is mild arthritis of the hip. There is facet degenerative change and disc desiccation lumbar spine. Please see MRI lumbar spine performed same date. Electronically Signed: Tasneem Slade MD  10/29/2024 2:30 PM EDT  Workstation ID: [id number]      I have discussed the interpretation of the above results with the patient.    Procedures          Assessment and Plan   Diagnoses and all orders for this visit:    1. Hypothyroidism (acquired) (Primary)  -     TSH; Future  -     T4, free; Future  -     T3, Free; Future  -     Comprehensive Hearing Test; Future    2. Multinodular goiter  -     US Thyroid; Future    3. Sensorineural hearing loss, bilateral  -     Comprehensive Hearing Test; Future        (E03.9) Hypothyroidism (acquired) - Plan: TSH, T4, free, T3, Free, Comprehensive Hearing Test    (E04.2) Multinodular goiter - Plan: US Thyroid    (H90.3) Sensorineural hearing loss, bilateral - Plan: Comprehensive Hearing Test     Bella Ritter  reports that she has never smoked. She has been exposed to tobacco smoke. She has never used smokeless tobacco.     Plan:  Patient Instructions   1.  Patient had audiogram obtained which shows SRT of 55 on the right and 45 on the left.  Discrimination scores 80% on the right and 87% on the left.  Tympanograms are type a bilaterally.  Pure-tone show right moderate to profound sensorineural hearing loss and left ear with mild to severe sensorineural hearing loss.  Despite the fact that there is a slight asymmetry I think we will continue to probably  follow that with annual audiogram.  At this point though I would recommend proceeding with a hearing aid evaluation.  2.  Patient has thyroid nodule which she had another ultrasound obtained showing 2.1 cm TR 4 nodule on the right that was previously in 2017 was measuring 2.9 cm that was previously biopsied in 2017 as well.  This has shrunken quite a bit.  Also noted is 1.2 cm TR 4 left thyroid nodule which is quite similar on examination as it was 1.6 cm in 2017.  So, all in all thyroid nodules remain stable and I would recommend continued observation as they are not a threat.  3.  Thyroid function test shows TSH normal at 0.5-2, free T4 at 1.03, intact PTH 43.3 and calcium 9.1 all being normal.  Vitamin D is stable at 55.7.  4.  I will see patient in a year with thyroid function test and ultrasound of thyroid and audiogram.      Follow Up   Return in about 1 year (around 12/19/2025), or Follow-up 1 year with ultrasound, thyroid labs, and audiogram.  Patient was given instructions and counseling regarding her condition or for health maintenance advice. Please see specific information pulled into the AVS if appropriate.

## 2024-12-19 NOTE — PATIENT INSTRUCTIONS
1.  Patient had audiogram obtained which shows SRT of 55 on the right and 45 on the left.  Discrimination scores 80% on the right and 87% on the left.  Tympanograms are type a bilaterally.  Pure-tone show right moderate to profound sensorineural hearing loss and left ear with mild to severe sensorineural hearing loss.  Despite the fact that there is a slight asymmetry I think we will continue to probably follow that with annual audiogram.  At this point though I would recommend proceeding with a hearing aid evaluation.  2.  Patient has thyroid nodule which she had another ultrasound obtained showing 2.1 cm TR 4 nodule on the right that was previously in 2017 was measuring 2.9 cm that was previously biopsied in 2017 as well.  This has shrunken quite a bit.  Also noted is 1.2 cm TR 4 left thyroid nodule which is quite similar on examination as it was 1.6 cm in 2017.  So, all in all thyroid nodules remain stable and I would recommend continued observation as they are not a threat.  3.  Thyroid function test shows TSH normal at 0.5-2, free T4 at 1.03, intact PTH 43.3 and calcium 9.1 all being normal.  Vitamin D is stable at 55.7.  4.  I will see patient in a year with thyroid function test and ultrasound of thyroid and audiogram.

## 2025-01-21 NOTE — PROGRESS NOTES
Primary Care Provider  Cindy Sal MD     Referring Provider  No ref. provider found     Chief Complaint  Seasonal allergies, Cough, Shortness of Breath, Wheezing, and Asthma    Subjective          History of Presenting Illness  Patient is an 84-year-old female, patient Dr. Caro who presents management dyspnea who presents for follow-up visit today. Patient states that she does get short of breath that is worse with exertion, moderate in severity, and improved with rest.  Patient states that she does have an intermittent cough. Patient does have Advair and Incruse inhalers, however has only been taking Advair once daily instead of twice daily as prescribed and takes Incruse sporadically.  Patient has Xopenex inhaler to take as needed.  Patient is also taking Zyrtec and Singulair for seasonal allergies.  Patient is on Protonix for reflux.  Patient states that she is using her CPAP machine when she sleeps and is under the care of Dr. Randall who is managing.  Patient denies any morning headaches or excessive daytime sleepiness. Patient denies fever, chills, night sweats, swollen glands in the head and neck, unintentional weight loss, hemoptysis, purulent sputum production, dysphagia, chest pain, palpitations, chest tightness, abdominal pain, nausea, vomiting, and diarrhea.  Patient also denies any myalgias, changes in sense of taste and/or smell, sore throat, any other coronavirus or flu-like symptoms.  Patient denies any leg swelling, orthopnea, paroxysmal nocturnal dyspnea.  Patient is able to perform activities of daily living.        Review of Systems     Family History   Problem Relation Age of Onset    Colon polyps Father     Colon polyps Other     Malig Hyperthermia Neg Hx         Social History     Socioeconomic History    Marital status:    Tobacco Use    Smoking status: Never     Passive exposure: Past    Smokeless tobacco: Never   Vaping Use    Vaping status: Never Used   Substance and  Sexual Activity    Alcohol use: Never    Drug use: Never    Sexual activity: Defer        Past Medical History:   Diagnosis Date    Asthma     Atrial fibrillation     Atrial fibrillation     Back pain     Breast cancer     Cancer     breast    CHF (congestive heart failure)     Cirrhosis of liver not due to alcohol     Diabetes mellitus     GERD (gastroesophageal reflux disease)     Hypertension     Kidney stone     Neuropathy     Sleep apnea     CPAP        Immunization History   Administered Date(s) Administered    COVID-19 (PFIZER) Purple Cap Monovalent 02/13/2021, 03/06/2021, 09/30/2021    Covid-19 (Pfizer) Gray Cap Monovalent 05/16/2022    Flu Vaccine Split Quad 10/09/2015    Fluad Quad 65+ 08/28/2024    Fluzone (or Fluarix & Flulaval for VFC) >6mos 10/09/2015    Fluzone High-Dose 65+YRS 08/23/2012    Fluzone Quad >6mos (Multi-dose) 09/12/2011, 08/22/2013, 09/15/2016, 09/14/2017, 09/18/2018    Influenza Injectable Mdck Pf Quad 10/13/2020, 10/18/2021    Influenza, Unspecified 10/13/2020, 10/18/2021    PEDS-Pneumococcal Conjugate (PCV7) 06/07/2023    Pneumococcal Conjugate 13-Valent (PCV13) 03/15/2016    Pneumococcal Conjugate 20-Valent (PCV20) 06/07/2023       Allergies   Allergen Reactions    Aricept [Donepezil] Unknown - High Severity    Nsaids Unknown - High Severity and Unknown - Low Severity    Lisinopril Swelling    Pseudoephedrine Swelling    Diphenhydramine Unknown - Low Severity    Exenatide GI Intolerance          Current Outpatient Medications:     apixaban (ELIQUIS) 5 MG tablet tablet, Take 1 tablet by mouth 2 (Two) Times a Day., Disp: , Rfl:     calcium carb-cholecalciferol 600-10 MG-MCG tablet per tablet, Every 12 (Twelve) Hours., Disp: , Rfl:     cetirizine (zyrTEC) 10 MG tablet, Take 1 tablet by mouth Daily for 90 days., Disp: 90 tablet, Rfl: 3    EPINEPHrine (EPIPEN) 0.3 MG/0.3ML solution auto-injector injection, , Disp: , Rfl:     fluticasone (FLONASE) 50 MCG/ACT nasal spray, , Disp: , Rfl:      fluticasone-salmeterol (ADVAIR HFA) 230-21 MCG/ACT inhaler, Inhale 2 puffs 2 (Two) Times a Day. Rinse mouth out after each use, Disp: 3 each, Rfl: 1    gabapentin (NEURONTIN) 600 MG tablet, Take 1 tablet by mouth 2 (Two) Times a Day., Disp: , Rfl:     hydrALAZINE (APRESOLINE) 50 MG tablet, , Disp: , Rfl:     ibandronate (BONIVA) 150 MG tablet, , Disp: , Rfl:     ibuprofen (ADVIL,MOTRIN) 800 MG tablet, Take 1 tablet by mouth Every 6 (Six) Hours As Needed., Disp: , Rfl:     insulin glargine (LANTUS, SEMGLEE) 100 UNIT/ML injection, Inject 45 Units under the skin into the appropriate area as directed Every Night., Disp: , Rfl:     Lancets (freestyle) lancets, , Disp: , Rfl:     levalbuterol (XOPENEX HFA) 45 MCG/ACT inhaler, Inhale 2 puffs Every 4 (Four) Hours As Needed for Wheezing or Shortness of Air for up to 90 days., Disp: 3 each, Rfl: 1    metoprolol succinate XL (TOPROL-XL) 25 MG 24 hr tablet, Take 3 tablets by mouth Every 12 (Twelve) Hours for 30 days. (Patient taking differently: Take 2 tablets by mouth Every 12 (Twelve) Hours.), Disp: 180 tablet, Rfl: 0    montelukast (SINGULAIR) 10 MG tablet, Take 1 tablet by mouth Every Night for 90 days., Disp: 90 tablet, Rfl: 3    NovoLOG FlexPen 100 UNIT/ML solution pen-injector sc pen, Inject 20 Units under the skin into the appropriate area as directed 3 (Three) Times a Day With Meals., Disp: , Rfl:     Ozempic, 0.25 or 0.5 MG/DOSE, 2 MG/3ML solution pen-injector, , Disp: , Rfl:     pantoprazole (PROTONIX) 40 MG EC tablet, Take 1 tablet by mouth Daily., Disp: , Rfl:     sodium chloride 0.65 % nasal spray, Every 6 (Six) Hours., Disp: , Rfl:     spironolactone-hydrochlorothiazide (ALDACTAZIDE) 25-25 MG tablet, Take 1 tablet by mouth Daily., Disp: , Rfl:     Umeclidinium Bromide (Incruse Ellipta) 62.5 MCG/ACT aerosol powder , Inhale 1 puff Daily for 90 days., Disp: 90 each, Rfl: 1    vitamin D (ERGOCALCIFEROL) 1.25 MG (55024 UT) capsule capsule, , Disp: , Rfl:     B-D UF  "III MINI PEN NEEDLES 31G X 5 MM misc, , Disp: , Rfl:     FREESTYLE LITE test strip, , Disp: , Rfl:     olmesartan (BENICAR) 40 MG tablet, Take 10 mg by mouth Daily. (Patient not taking: Reported on 2/5/2025), Disp: , Rfl:     Trulicity 3 MG/0.5ML solution pen-injector, Inject 0.5 mL under the skin into the appropriate area as directed 1 (One) Time Per Week. THURSDAYS (Patient not taking: Reported on 12/4/2024), Disp: , Rfl:      Objective     Physical Exam  Vital Signs:   WDWN, Alert, NAD.    HEENT:  PERRL, EOMI.  OP, nares clear, no sinus tenderness  Neck:  Supple, no JVD, no thyromegaly.  Lymph: no axillary, cervical, supraclavicular lymphadenopathy noted bilaterally  Chest:  good aeration, clear to auscultation bilaterally, tympanic to percussion bilaterally, no work of breathing noted  CV: RRR, no MGR, pulses 2+, equal.  Abd:  Soft, NT, ND, + BS, no HSM  EXT:  no clubbing, no cyanosis, no edema, no joint tenderness  Neuro:  A&Ox3, CN grossly intact, no focal deficits.  Skin: No rashes or lesions noted.    /78 (BP Location: Left arm, Patient Position: Sitting, Cuff Size: Adult)   Pulse 64   Temp 97.5 °F (36.4 °C) (Oral)   Resp 16   Ht 152.4 cm (60\")   Wt 96.2 kg (212 lb)   SpO2 96% Comment: room air  BMI 41.40 kg/m²         Result Review :   I have reviewed my last office visit note.    Procedures:           Assessment and Plan      Assessment:  1.  Asthma.  Methacholine challenge test consistent with asthma.  Patient is on LABA/ICS therapy.  2. Chronic dyspnea. PFTs normal.  Methacholine challenge test consistent with asthma.   Alpha-1 with a normal genotype of M/M with level of 117.   3.  Peripheral eosinophilia.  4.  Obstructive sleep apnea on CPAP.  Patient is under the care of the sleep center with Dr. Randall.  5.  Elevated IgE level: 80.1.  6.  Seasonal allergies.  7.  GERD.  Patient is on a PPI.  8.  Paroxysmal atrial fibrillation: Patient is under the care of Dr. Ghassan Jimenez.  9..  Never " smoker.           Plan:  1.  Patient states that she has only been taking Advair once daily instead of twice daily as prescribed and takes Incruse sporadically.  Patient is advised to take Advair 2 puffs twice daily as prescribed and Incruse inhaler 1 puff once daily as prescribed and rinse mouth out after each use.  Medication compliance discussed with patient in the office today.  Risks of not taking medications as prescribed discussed with the patient.  Patient verbalized understanding and compliance. Patient is advised to take all medications as prescribed.  2.  Continue Xopenex inhaler as needed.  Patient continues to decline nebulizer treatments and states that she will like to speak with her cardiologist first before starting any nebulizer treatments.  Asthma action plan discussed with the patient in the office today.  3.  Continue Singulair and Zyrtec and follow-up with allergist as scheduled.    4.  Follow-up with cardiologist as scheduled.  5.  For GERD,  patient to continue PPI.   Patient is also advised to sleep with the head of the bed elevated and do not eat 3-4 hours prior to bedtime.  6.  Continue CPAP at night and with naps at current settings and clean mask and tubing daily.  Follow-up with the sleep center as scheduled.  7.  Vaccination status: patient reports they are up-to-date with flu, pneumonia, and Covid vaccines.  Patient is advised to continue to follow CDC recommendations such as social distancing wearing a mask and washing hands for at least 20 seconds.  8.  Smoking status: Never smoker.  9.  Patient to call the office, 911, or go to the ER with new or worsening symptoms.  10.  Follow-up with cardiologist as scheduled.  11.  Follow-up in 4 months, sooner if needed.             Follow Up   Return in about 4 months (around 6/5/2025).  Patient was given instructions and counseling regarding her condition or for health maintenance advice. Please see specific information pulled into the AVS  if appropriate.

## 2025-02-04 ENCOUNTER — TRANSCRIBE ORDERS (OUTPATIENT)
Dept: ADMINISTRATIVE | Facility: HOSPITAL | Age: 85
End: 2025-02-04
Payer: MEDICARE

## 2025-02-04 DIAGNOSIS — Z12.31 VISIT FOR SCREENING MAMMOGRAM: Primary | ICD-10-CM

## 2025-02-05 ENCOUNTER — OFFICE VISIT (OUTPATIENT)
Dept: PULMONOLOGY | Facility: CLINIC | Age: 85
End: 2025-02-05
Payer: MEDICARE

## 2025-02-05 VITALS
DIASTOLIC BLOOD PRESSURE: 78 MMHG | OXYGEN SATURATION: 96 % | RESPIRATION RATE: 16 BRPM | BODY MASS INDEX: 41.62 KG/M2 | HEART RATE: 64 BPM | SYSTOLIC BLOOD PRESSURE: 162 MMHG | HEIGHT: 60 IN | TEMPERATURE: 97.5 F | WEIGHT: 212 LBS

## 2025-02-05 DIAGNOSIS — D72.19 PERIPHERAL EOSINOPHILIA: ICD-10-CM

## 2025-02-05 DIAGNOSIS — R06.09 CHRONIC DYSPNEA: ICD-10-CM

## 2025-02-05 DIAGNOSIS — R76.8 ELEVATED IGE LEVEL: ICD-10-CM

## 2025-02-05 DIAGNOSIS — I48.0 PAROXYSMAL ATRIAL FIBRILLATION: Chronic | ICD-10-CM

## 2025-02-05 DIAGNOSIS — G47.33 OSA ON CPAP: ICD-10-CM

## 2025-02-05 DIAGNOSIS — K21.9 GASTROESOPHAGEAL REFLUX DISEASE, UNSPECIFIED WHETHER ESOPHAGITIS PRESENT: ICD-10-CM

## 2025-02-05 DIAGNOSIS — J30.2 SEASONAL ALLERGIES: Primary | ICD-10-CM

## 2025-02-05 DIAGNOSIS — J45.909 ASTHMA, UNSPECIFIED ASTHMA SEVERITY, UNSPECIFIED WHETHER COMPLICATED, UNSPECIFIED WHETHER PERSISTENT: ICD-10-CM

## 2025-02-05 RX ORDER — FLUTICASONE PROPIONATE AND SALMETEROL XINAFOATE 230; 21 UG/1; UG/1
2 AEROSOL, METERED RESPIRATORY (INHALATION)
Qty: 3 EACH | Refills: 1 | Status: SHIPPED | OUTPATIENT
Start: 2025-02-05

## 2025-02-05 RX ORDER — LEVALBUTEROL TARTRATE 45 UG/1
2 AEROSOL, METERED ORAL EVERY 4 HOURS PRN
Qty: 3 EACH | Refills: 1 | Status: SHIPPED | OUTPATIENT
Start: 2025-02-05 | End: 2025-05-06

## 2025-02-05 RX ORDER — UMECLIDINIUM 62.5 UG/1
1 AEROSOL, POWDER ORAL DAILY
Qty: 90 EACH | Refills: 1 | Status: SHIPPED | OUTPATIENT
Start: 2025-02-05 | End: 2025-05-06

## 2025-02-10 ENCOUNTER — TRANSCRIBE ORDERS (OUTPATIENT)
Dept: ADMINISTRATIVE | Facility: HOSPITAL | Age: 85
End: 2025-02-10
Payer: MEDICARE

## 2025-02-10 DIAGNOSIS — M81.8 SECONDARY OSTEOPOROSIS: Primary | ICD-10-CM

## 2025-02-19 ENCOUNTER — TELEPHONE (OUTPATIENT)
Dept: PULMONOLOGY | Facility: CLINIC | Age: 85
End: 2025-02-19

## 2025-02-19 NOTE — TELEPHONE ENCOUNTER
Caller: Bella Ritter    Relationship to Patient: Self    Phone Number:     [phone]        Reason for Call: THE PT IS HAVING TROUBLE WITH HER CPAP MACHINE. PLEASE ADVISE.

## 2025-02-19 NOTE — TELEPHONE ENCOUNTER
Called and spoke with pt, she states her cpap is getting really hot and now stopped working.  I did advise her to call her sleep doc as they are the ones that manage her sleep and ordered her cpap.  She is doing that now.

## 2025-04-10 ENCOUNTER — TRANSCRIBE ORDERS (OUTPATIENT)
Dept: LAB | Facility: HOSPITAL | Age: 85
End: 2025-04-10
Payer: MEDICARE

## 2025-04-10 ENCOUNTER — LAB (OUTPATIENT)
Dept: LAB | Facility: HOSPITAL | Age: 85
End: 2025-04-10
Payer: MEDICARE

## 2025-04-10 DIAGNOSIS — R60.0 LOCALIZED EDEMA: ICD-10-CM

## 2025-04-10 DIAGNOSIS — G47.33 OBSTRUCTIVE SLEEP APNEA: ICD-10-CM

## 2025-04-10 DIAGNOSIS — E55.9 VITAMIN D DEFICIENCY, UNSPECIFIED: ICD-10-CM

## 2025-04-10 DIAGNOSIS — E78.5 HYPERLIPIDEMIA, UNSPECIFIED HYPERLIPIDEMIA TYPE: ICD-10-CM

## 2025-04-10 DIAGNOSIS — E11.21 DIABETIC GLOMERULOPATHY: ICD-10-CM

## 2025-04-10 DIAGNOSIS — R80.1 PERSISTENT PROTEINURIA: ICD-10-CM

## 2025-04-10 DIAGNOSIS — E11.22 TYPE 2 DIABETES MELLITUS WITH DIABETIC CHRONIC KIDNEY DISEASE, UNSPECIFIED CKD STAGE, UNSPECIFIED WHETHER LONG TERM INSULIN USE: ICD-10-CM

## 2025-04-10 DIAGNOSIS — I10 HYPERTENSION, UNSPECIFIED TYPE: ICD-10-CM

## 2025-04-10 DIAGNOSIS — N18.32 CHRONIC KIDNEY DISEASE (CKD) STAGE G3B/A1, MODERATELY DECREASED GLOMERULAR FILTRATION RATE (GFR) BETWEEN 30-44 ML/MIN/1.73 SQUARE METER AND ALBUMINURIA CREATININE RATIO LESS THAN 30 MG/G (CMS/H*: ICD-10-CM

## 2025-04-10 DIAGNOSIS — N18.32 CHRONIC KIDNEY DISEASE (CKD) STAGE G3B/A1, MODERATELY DECREASED GLOMERULAR FILTRATION RATE (GFR) BETWEEN 30-44 ML/MIN/1.73 SQUARE METER AND ALBUMINURIA CREATININE RATIO LESS THAN 30 MG/G (CMS/H*: Primary | ICD-10-CM

## 2025-04-10 DIAGNOSIS — I48.0 PAROXYSMAL ATRIAL FIBRILLATION: ICD-10-CM

## 2025-04-10 PROCEDURE — 80048 BASIC METABOLIC PNL TOTAL CA: CPT

## 2025-04-10 PROCEDURE — 36415 COLL VENOUS BLD VENIPUNCTURE: CPT

## 2025-04-11 LAB
ANION GAP SERPL CALCULATED.3IONS-SCNC: 10 MMOL/L (ref 5–15)
BUN SERPL-MCNC: 24 MG/DL (ref 8–23)
BUN/CREAT SERPL: 16.1 (ref 7–25)
CALCIUM SPEC-SCNC: 9.8 MG/DL (ref 8.6–10.5)
CHLORIDE SERPL-SCNC: 104 MMOL/L (ref 98–107)
CO2 SERPL-SCNC: 28 MMOL/L (ref 22–29)
CREAT SERPL-MCNC: 1.49 MG/DL (ref 0.57–1)
EGFRCR SERPLBLD CKD-EPI 2021: 34.1 ML/MIN/1.73
GLUCOSE SERPL-MCNC: 97 MG/DL (ref 65–99)
POTASSIUM SERPL-SCNC: 4.1 MMOL/L (ref 3.5–5.2)
SODIUM SERPL-SCNC: 142 MMOL/L (ref 136–145)

## 2025-04-21 ENCOUNTER — HOSPITAL ENCOUNTER (OUTPATIENT)
Dept: MAMMOGRAPHY | Facility: HOSPITAL | Age: 85
Discharge: HOME | End: 2025-04-21
Admitting: INTERNAL MEDICINE
Payer: MEDICARE

## 2025-04-21 DIAGNOSIS — Z12.31 VISIT FOR SCREENING MAMMOGRAM: ICD-10-CM

## 2025-04-21 PROCEDURE — 77063 BREAST TOMOSYNTHESIS BI: CPT

## 2025-04-21 PROCEDURE — 77067 SCR MAMMO BI INCL CAD: CPT

## 2025-06-03 ENCOUNTER — TRANSCRIBE ORDERS (OUTPATIENT)
Dept: ULTRASOUND IMAGING | Facility: HOSPITAL | Age: 85
End: 2025-06-03
Payer: MEDICARE

## 2025-06-03 DIAGNOSIS — I10 ESSENTIAL HYPERTENSION: Primary | ICD-10-CM

## 2025-06-09 NOTE — PROGRESS NOTES
Primary Care Provider  Cindy Sal MD     Referring Provider  No ref. provider found     Chief Complaint  Seasonal allergies, Cough, Shortness of Breath, Wheezing, and Follow-up    Subjective          History of Presenting Illness  Patient is an 84-year-old female, patient of Dr. Caro who presents management dyspnea who presents for a follow-up visit today.     Patient states that she does get short of breath that is worse with exertion, moderate in severity, and improved with rest.  Patient is concerned that her shortness of breath has gotten worse.  Patient states that she is under the care of Dr. Randall who is managing her CPAP and she will be following up with him for a compliance report.  Patient is also on the care of Dr. Ghassan Jimenez, cardiologist and will be following up with him as patient states that her heart rate fluctuates.  Patient states that she is taking Advair, however only admits to taking it once a day as she is nervous about medication affecting her heart and does take Incruse 1 puff once daily as prescribed.  Patient has a Xopenex inhaler to take as needed.  Patient continues to decline nebulizer treatments as she states they affect her heart. Patient is also taking Zyrtec and Singulair for seasonal allergies. Patient is on Protonix for reflux. Patient states that she is using her CPAP machine when she sleeps and is under the care of Dr. Randall who is managing. Patient denies any morning headaches or excessive daytime sleepiness.     Patient denies fever, chills, night sweats, swollen glands in the head and neck, unintentional weight loss, hemoptysis, purulent sputum production, dysphagia, chest pain, palpitations, chest tightness, abdominal pain, nausea, vomiting, and diarrhea.   Patient denies any leg swelling, orthopnea, paroxysmal nocturnal dyspnea.  Patient is able to perform activities of daily living.        Review of Systems     Family History   Problem Relation Age of  Onset    Colon polyps Father     Colon polyps Other     Malig Hyperthermia Neg Hx         Social History     Socioeconomic History    Marital status:    Tobacco Use    Smoking status: Never     Passive exposure: Past    Smokeless tobacco: Never   Vaping Use    Vaping status: Never Used   Substance and Sexual Activity    Alcohol use: Never    Drug use: Never    Sexual activity: Defer        Past Medical History:   Diagnosis Date    Asthma     Atrial fibrillation     Atrial fibrillation     Back pain     Breast cancer     Cancer     breast    CHF (congestive heart failure)     Cirrhosis of liver not due to alcohol     Diabetes mellitus     GERD (gastroesophageal reflux disease)     Hypertension     Kidney stone     Neuropathy     Sleep apnea     CPAP        Immunization History   Administered Date(s) Administered    COVID-19 (PFIZER) Purple Cap Monovalent 02/13/2021, 03/06/2021, 09/30/2021    Covid-19 (Pfizer) Gray Cap Monovalent 05/16/2022    FLUAD TRI 65YR+ 08/15/2024    Flu Vaccine Split Quad 10/09/2015    Fluad Quad 65+ 08/28/2024    Fluzone (or Fluarix & Flulaval for VFC) >6mos 10/09/2015    Fluzone High-Dose 65+YRS 08/23/2012    Fluzone Quad >6mos (Multi-dose) 09/12/2011, 08/22/2013, 09/15/2016, 09/14/2017, 09/18/2018    Influenza Injectable Mdck Pf Quad 10/13/2020, 10/18/2021, 10/04/2022, 09/06/2023    Influenza Seasonal Injectable 09/24/2019, 09/14/2020, 09/27/2021    Influenza, Unspecified 10/13/2020, 10/18/2021    PEDS-Pneumococcal Conjugate (PCV7) 06/07/2023    Pneumococcal Conjugate 13-Valent (PCV13) 03/15/2016    Pneumococcal Conjugate 20-Valent (PCV20) 06/07/2023, 08/15/2024       Allergies   Allergen Reactions    Aricept [Donepezil] Unknown - High Severity    Nsaids Unknown - High Severity and Unknown - Low Severity    Lisinopril Swelling    Pseudoephedrine Swelling    Diphenhydramine Unknown - Low Severity    Exenatide GI Intolerance          Current Outpatient Medications:     apixaban  (ELIQUIS) 5 MG tablet tablet, Take 1 tablet by mouth 2 (Two) Times a Day., Disp: , Rfl:     calcium carb-cholecalciferol 600-10 MG-MCG tablet per tablet, Every 12 (Twelve) Hours., Disp: , Rfl:     cetirizine (zyrTEC) 10 MG tablet, Take 1 tablet by mouth Daily for 90 days., Disp: 90 tablet, Rfl: 3    EPINEPHrine (EPIPEN) 0.3 MG/0.3ML solution auto-injector injection, , Disp: , Rfl:     fluticasone (FLONASE) 50 MCG/ACT nasal spray, , Disp: , Rfl:     fluticasone-salmeterol (ADVAIR HFA) 230-21 MCG/ACT inhaler, Inhale 2 puffs 2 (Two) Times a Day. Rinse mouth out after each use, Disp: 3 each, Rfl: 1    gabapentin (NEURONTIN) 600 MG tablet, Take 1 tablet by mouth 2 (Two) Times a Day., Disp: , Rfl:     hydrALAZINE (APRESOLINE) 50 MG tablet, , Disp: , Rfl:     ibandronate (BONIVA) 150 MG tablet, , Disp: , Rfl:     ibuprofen (ADVIL,MOTRIN) 800 MG tablet, Take 1 tablet by mouth Every 6 (Six) Hours As Needed., Disp: , Rfl:     Incruse Ellipta 62.5 MCG/ACT aerosol powder , Inhale 1 puff Daily for 90 days., Disp: 90 each, Rfl: 1    insulin glargine (LANTUS, SEMGLEE) 100 UNIT/ML injection, Inject 45 Units under the skin into the appropriate area as directed Every Night., Disp: , Rfl:     levalbuterol (XOPENEX HFA) 45 MCG/ACT inhaler, Inhale 2 puffs Every 4 (Four) Hours As Needed for Wheezing or Shortness of Air for up to 90 days., Disp: 3 each, Rfl: 1    metoprolol succinate XL (TOPROL-XL) 25 MG 24 hr tablet, Take 3 tablets by mouth Every 12 (Twelve) Hours for 30 days., Disp: 180 tablet, Rfl: 0    montelukast (SINGULAIR) 10 MG tablet, Take 1 tablet by mouth Every Night for 90 days., Disp: 90 tablet, Rfl: 1    NovoLOG FlexPen 100 UNIT/ML solution pen-injector sc pen, Inject 20 Units under the skin into the appropriate area as directed 3 (Three) Times a Day With Meals., Disp: , Rfl:     olmesartan (BENICAR) 40 MG tablet, Take 10 mg by mouth Daily., Disp: , Rfl:     Ozempic, 0.25 or 0.5 MG/DOSE, 2 MG/3ML solution pen-injector, ,  "Disp: , Rfl:     pantoprazole (PROTONIX) 40 MG EC tablet, Take 1 tablet by mouth Daily., Disp: , Rfl:     sodium chloride 0.65 % nasal spray, Every 6 (Six) Hours., Disp: , Rfl:     spironolactone-hydrochlorothiazide (ALDACTAZIDE) 25-25 MG tablet, Take 1 tablet by mouth Daily., Disp: , Rfl:     vitamin D (ERGOCALCIFEROL) 1.25 MG (78772 UT) capsule capsule, , Disp: , Rfl:     B-D UF III MINI PEN NEEDLES 31G X 5 MM misc, , Disp: , Rfl:     FREESTYLE LITE test strip, , Disp: , Rfl:     Lancets (freestyle) lancets, , Disp: , Rfl:     Trulicity 3 MG/0.5ML solution pen-injector, Inject 0.5 mL under the skin into the appropriate area as directed 1 (One) Time Per Week. THURSDAYS (Patient not taking: Reported on 6/17/2025), Disp: , Rfl:      Objective     Physical Exam  Vital Signs:   WDWN, Alert, NAD.    HEENT:  PERRL, EOMI.  OP, nares clear, no sinus tenderness  Neck:  Supple, no JVD, no thyromegaly.  Lymph: no axillary, cervical, supraclavicular lymphadenopathy noted bilaterally  Chest:  good aeration, clear to auscultation bilaterally, tympanic to percussion bilaterally, no work of breathing noted  CV: RRR, no MGR, pulses 2+, equal.  Abd:  Soft, NT, ND, + BS, no HSM  EXT:  no clubbing, no cyanosis, no edema, no joint tenderness  Neuro:  A&Ox3, CN grossly intact, no focal deficits.  Skin: No rashes or lesions noted.    /62 (BP Location: Left arm, Patient Position: Sitting, Cuff Size: Large Adult)   Pulse 53   Temp 97.6 °F (36.4 °C) (Oral)   Resp 18   Ht 152.4 cm (60\")   Wt 96.3 kg (212 lb 6.4 oz)   SpO2 95% Comment: room air  BMI 41.48 kg/m²         Result Review :   I have reviewed my last office visit note.    Procedures:              Assessment and Plan      Assessment:  1.  Asthma.  Methacholine challenge test consistent with asthma.  Patient is on LABA/ICS therapy.  2. Chronic dyspnea. PFTs normal.  Methacholine challenge test consistent with asthma.   Alpha-1 with a normal genotype of M/M with level of " 117.   3.  Peripheral eosinophilia.  4.  Obstructive sleep apnea on CPAP.  Patient is under the care of the sleep center with Dr. Randall.  5.  Elevated IgE level: 80.1.  6.  Seasonal allergies.  7.  GERD.  Patient is on a PPI.  8.  Paroxysmal atrial fibrillation: Patient is under the care of Dr. Ghassan Jimenez.  9.  Never smoker.        Plan:  1.  Chronic dyspnea, worsening.  Will order an updated pulmonary function test, 6-minute walk test, chest CT scan, CBC, CMP, IgE level, and TSH with thyroid panel for further evaluation.  2.  Continue Advair, Incruse, and Xopenex inhalers.  Patient continues to decline nebulizer treatments and states that she will like to speak with her cardiologist first before starting any nebulizer treatments.  Asthma action plan discussed with the patient in the office today.  3.  Continue Singulair and Zyrtec and follow-up with allergist as scheduled.    4.  Follow-up with cardiologist as scheduled.  5.  For GERD,  patient to continue PPI.   Patient is also advised to sleep with the head of the bed elevated and do not eat 3-4 hours prior to bedtime.  6.  Continue CPAP at night and with naps at current settings and clean mask and tubing daily.  Follow-up with the sleep center as scheduled.  7.  Vaccination status: patient reports they are up-to-date with flu, pneumonia, and Covid vaccines.    8.  Smoking status: Never smoker.  9.  Patient to call the office, 911, or go to the ER with new or worsening symptoms.  10.  Follow-up with cardiologist as scheduled.  11.  Follow-up in 6 to 8 weeks, sooner if needed.          Follow Up   Return for 6-8 weeks.  Patient was given instructions and counseling regarding her condition or for health maintenance advice. Please see specific information pulled into the AVS if appropriate.

## 2025-06-12 ENCOUNTER — HOSPITAL ENCOUNTER (OUTPATIENT)
Dept: ULTRASOUND IMAGING | Facility: HOSPITAL | Age: 85
Discharge: HOME | End: 2025-06-12
Admitting: INTERNAL MEDICINE
Payer: MEDICARE

## 2025-06-12 DIAGNOSIS — I10 ESSENTIAL HYPERTENSION: ICD-10-CM

## 2025-06-12 PROCEDURE — 76705 ECHO EXAM OF ABDOMEN: CPT

## 2025-06-17 ENCOUNTER — OFFICE VISIT (OUTPATIENT)
Dept: PULMONOLOGY | Facility: CLINIC | Age: 85
End: 2025-06-17
Payer: MEDICARE

## 2025-06-17 VITALS
DIASTOLIC BLOOD PRESSURE: 62 MMHG | HEART RATE: 53 BPM | TEMPERATURE: 97.6 F | SYSTOLIC BLOOD PRESSURE: 125 MMHG | RESPIRATION RATE: 18 BRPM | WEIGHT: 212.4 LBS | OXYGEN SATURATION: 95 % | HEIGHT: 60 IN | BODY MASS INDEX: 41.7 KG/M2

## 2025-06-17 DIAGNOSIS — R06.09 CHRONIC DYSPNEA: ICD-10-CM

## 2025-06-17 DIAGNOSIS — I71.20 INTRATHORACIC AORTIC ANEURYSM: ICD-10-CM

## 2025-06-17 DIAGNOSIS — D72.19 PERIPHERAL EOSINOPHILIA: ICD-10-CM

## 2025-06-17 DIAGNOSIS — K21.9 GASTROESOPHAGEAL REFLUX DISEASE, UNSPECIFIED WHETHER ESOPHAGITIS PRESENT: ICD-10-CM

## 2025-06-17 DIAGNOSIS — J30.2 SEASONAL ALLERGIES: Primary | ICD-10-CM

## 2025-06-17 DIAGNOSIS — I48.0 PAROXYSMAL ATRIAL FIBRILLATION: Chronic | ICD-10-CM

## 2025-06-17 DIAGNOSIS — R76.8 ELEVATED IGE LEVEL: ICD-10-CM

## 2025-06-17 DIAGNOSIS — J45.909 ASTHMA, UNSPECIFIED ASTHMA SEVERITY, UNSPECIFIED WHETHER COMPLICATED, UNSPECIFIED WHETHER PERSISTENT: ICD-10-CM

## 2025-06-17 PROCEDURE — 1160F RVW MEDS BY RX/DR IN RCRD: CPT | Performed by: NURSE PRACTITIONER

## 2025-06-17 PROCEDURE — 99214 OFFICE O/P EST MOD 30 MIN: CPT | Performed by: NURSE PRACTITIONER

## 2025-06-17 PROCEDURE — 1159F MED LIST DOCD IN RCRD: CPT | Performed by: NURSE PRACTITIONER

## 2025-06-17 PROCEDURE — G2211 COMPLEX E/M VISIT ADD ON: HCPCS | Performed by: NURSE PRACTITIONER

## 2025-06-17 RX ORDER — MONTELUKAST SODIUM 10 MG/1
10 TABLET ORAL NIGHTLY
Qty: 90 TABLET | Refills: 1 | Status: SHIPPED | OUTPATIENT
Start: 2025-06-17 | End: 2025-09-15

## 2025-06-17 RX ORDER — UMECLIDINIUM 62.5 UG/1
1 AEROSOL, POWDER ORAL
Qty: 90 EACH | Refills: 1 | Status: SHIPPED | OUTPATIENT
Start: 2025-06-17 | End: 2025-09-15

## 2025-06-17 RX ORDER — FLUTICASONE PROPIONATE AND SALMETEROL XINAFOATE 230; 21 UG/1; UG/1
2 AEROSOL, METERED RESPIRATORY (INHALATION)
Qty: 3 EACH | Refills: 1 | Status: SHIPPED | OUTPATIENT
Start: 2025-06-17

## 2025-06-17 RX ORDER — UMECLIDINIUM 62.5 UG/1
AEROSOL, POWDER ORAL
COMMUNITY
Start: 2025-05-06 | End: 2025-06-17 | Stop reason: SDUPTHER

## 2025-06-17 RX ORDER — LEVALBUTEROL TARTRATE 45 UG/1
2 AEROSOL, METERED ORAL EVERY 4 HOURS PRN
Qty: 3 EACH | Refills: 1 | Status: SHIPPED | OUTPATIENT
Start: 2025-06-17 | End: 2025-09-15

## 2025-06-23 ENCOUNTER — TRANSCRIBE ORDERS (OUTPATIENT)
Dept: ADMINISTRATIVE | Facility: HOSPITAL | Age: 85
End: 2025-06-23
Payer: MEDICARE

## 2025-06-23 DIAGNOSIS — M54.16 LUMBAR RADICULOPATHY: Primary | ICD-10-CM

## 2025-06-27 ENCOUNTER — HOSPITAL ENCOUNTER (OUTPATIENT)
Dept: CT IMAGING | Facility: HOSPITAL | Age: 85
Discharge: HOME | End: 2025-06-27
Payer: MEDICARE

## 2025-06-27 DIAGNOSIS — J30.2 SEASONAL ALLERGIES: ICD-10-CM

## 2025-06-27 DIAGNOSIS — J45.909 ASTHMA, UNSPECIFIED ASTHMA SEVERITY, UNSPECIFIED WHETHER COMPLICATED, UNSPECIFIED WHETHER PERSISTENT: ICD-10-CM

## 2025-06-27 DIAGNOSIS — K21.9 GASTROESOPHAGEAL REFLUX DISEASE, UNSPECIFIED WHETHER ESOPHAGITIS PRESENT: ICD-10-CM

## 2025-06-27 DIAGNOSIS — I71.20 INTRATHORACIC AORTIC ANEURYSM: ICD-10-CM

## 2025-06-27 DIAGNOSIS — D72.19 PERIPHERAL EOSINOPHILIA: ICD-10-CM

## 2025-06-27 DIAGNOSIS — R76.8 ELEVATED IGE LEVEL: ICD-10-CM

## 2025-06-27 DIAGNOSIS — I48.0 PAROXYSMAL ATRIAL FIBRILLATION: Chronic | ICD-10-CM

## 2025-06-27 DIAGNOSIS — R06.09 CHRONIC DYSPNEA: ICD-10-CM

## 2025-06-27 PROCEDURE — 71250 CT THORAX DX C-: CPT

## 2025-07-03 ENCOUNTER — TRANSCRIBE ORDERS (OUTPATIENT)
Dept: ADMINISTRATIVE | Facility: HOSPITAL | Age: 85
End: 2025-07-03
Payer: MEDICARE

## 2025-07-03 DIAGNOSIS — R60.0 LOCALIZED EDEMA: ICD-10-CM

## 2025-07-03 DIAGNOSIS — E55.9 VITAMIN D DEFICIENCY: ICD-10-CM

## 2025-07-03 DIAGNOSIS — I48.0 PAROXYSMAL ATRIAL FIBRILLATION: ICD-10-CM

## 2025-07-03 DIAGNOSIS — R80.1 PERSISTENT PROTEINURIA, UNSPECIFIED: ICD-10-CM

## 2025-07-03 DIAGNOSIS — I10 HYPERTENSION, ESSENTIAL: ICD-10-CM

## 2025-07-03 DIAGNOSIS — N18.30 STAGE 3 CHRONIC KIDNEY DISEASE, UNSPECIFIED WHETHER STAGE 3A OR 3B CKD: Primary | ICD-10-CM

## 2025-07-03 DIAGNOSIS — E78.5 HYPERLIPIDEMIA, UNSPECIFIED HYPERLIPIDEMIA TYPE: ICD-10-CM

## 2025-07-03 DIAGNOSIS — G47.33 OSA (OBSTRUCTIVE SLEEP APNEA): ICD-10-CM

## 2025-07-03 DIAGNOSIS — E11.22 TYPE 2 DIABETES MELLITUS WITH ESRD (END-STAGE RENAL DISEASE): ICD-10-CM

## 2025-07-03 DIAGNOSIS — N18.6 TYPE 2 DIABETES MELLITUS WITH ESRD (END-STAGE RENAL DISEASE): ICD-10-CM

## 2025-07-03 DIAGNOSIS — E11.21 TYPE II DIABETES MELLITUS WITH NEPHROPATHY: ICD-10-CM

## 2025-07-06 DIAGNOSIS — I71.20 INTRATHORACIC AORTIC ANEURYSM: Primary | ICD-10-CM

## 2025-07-07 ENCOUNTER — LAB (OUTPATIENT)
Dept: LAB | Facility: HOSPITAL | Age: 85
End: 2025-07-07
Payer: MEDICARE

## 2025-07-07 DIAGNOSIS — I48.0 PAROXYSMAL ATRIAL FIBRILLATION: ICD-10-CM

## 2025-07-07 DIAGNOSIS — E11.21 TYPE II DIABETES MELLITUS WITH NEPHROPATHY: ICD-10-CM

## 2025-07-07 DIAGNOSIS — N18.30 STAGE 3 CHRONIC KIDNEY DISEASE, UNSPECIFIED WHETHER STAGE 3A OR 3B CKD: ICD-10-CM

## 2025-07-07 DIAGNOSIS — N18.6 TYPE 2 DIABETES MELLITUS WITH ESRD (END-STAGE RENAL DISEASE): ICD-10-CM

## 2025-07-07 DIAGNOSIS — I10 HYPERTENSION, ESSENTIAL: ICD-10-CM

## 2025-07-07 DIAGNOSIS — R80.1 PERSISTENT PROTEINURIA, UNSPECIFIED: ICD-10-CM

## 2025-07-07 DIAGNOSIS — G47.33 OSA (OBSTRUCTIVE SLEEP APNEA): ICD-10-CM

## 2025-07-07 DIAGNOSIS — E55.9 VITAMIN D DEFICIENCY: ICD-10-CM

## 2025-07-07 DIAGNOSIS — R60.0 LOCALIZED EDEMA: ICD-10-CM

## 2025-07-07 DIAGNOSIS — E78.5 HYPERLIPIDEMIA, UNSPECIFIED HYPERLIPIDEMIA TYPE: ICD-10-CM

## 2025-07-07 DIAGNOSIS — E11.22 TYPE 2 DIABETES MELLITUS WITH ESRD (END-STAGE RENAL DISEASE): ICD-10-CM

## 2025-07-07 LAB
25(OH)D3 SERPL-MCNC: 44.6 NG/ML (ref 30–100)
ALBUMIN SERPL-MCNC: 3.8 G/DL (ref 3.5–5.2)
ANION GAP SERPL CALCULATED.3IONS-SCNC: 9 MMOL/L (ref 5–15)
BACTERIA UR QL AUTO: NORMAL /HPF
BASOPHILS # BLD AUTO: 0.04 10*3/MM3 (ref 0–0.2)
BASOPHILS NFR BLD AUTO: 0.8 % (ref 0–1.5)
BILIRUB UR QL STRIP: NEGATIVE
BUN SERPL-MCNC: 16 MG/DL (ref 8–23)
BUN/CREAT SERPL: 12.5 (ref 7–25)
CALCIUM SPEC-SCNC: 9.4 MG/DL (ref 8.6–10.5)
CHLORIDE SERPL-SCNC: 105 MMOL/L (ref 98–107)
CLARITY UR: CLEAR
CO2 SERPL-SCNC: 26 MMOL/L (ref 22–29)
COLOR UR: YELLOW
CREAT SERPL-MCNC: 1.28 MG/DL (ref 0.57–1)
CREAT UR-MCNC: 77.9 MG/DL
DEPRECATED RDW RBC AUTO: 40 FL (ref 37–54)
EGFRCR SERPLBLD CKD-EPI 2021: 40.9 ML/MIN/1.73
EOSINOPHIL # BLD AUTO: 0.39 10*3/MM3 (ref 0–0.4)
EOSINOPHIL NFR BLD AUTO: 7.5 % (ref 0.3–6.2)
ERYTHROCYTE [DISTWIDTH] IN BLOOD BY AUTOMATED COUNT: 12.4 % (ref 12.3–15.4)
GLUCOSE SERPL-MCNC: 141 MG/DL (ref 65–99)
GLUCOSE UR STRIP-MCNC: NEGATIVE MG/DL
HCT VFR BLD AUTO: 41.8 % (ref 34–46.6)
HGB BLD-MCNC: 13.7 G/DL (ref 12–15.9)
HGB UR QL STRIP.AUTO: NEGATIVE
HYALINE CASTS UR QL AUTO: NORMAL /LPF
IMM GRANULOCYTES # BLD AUTO: 0.01 10*3/MM3 (ref 0–0.05)
IMM GRANULOCYTES NFR BLD AUTO: 0.2 % (ref 0–0.5)
KETONES UR QL STRIP: NEGATIVE
LEUKOCYTE ESTERASE UR QL STRIP.AUTO: NEGATIVE
LYMPHOCYTES # BLD AUTO: 1.12 10*3/MM3 (ref 0.7–3.1)
LYMPHOCYTES NFR BLD AUTO: 21.4 % (ref 19.6–45.3)
MCH RBC QN AUTO: 29.6 PG (ref 26.6–33)
MCHC RBC AUTO-ENTMCNC: 32.8 G/DL (ref 31.5–35.7)
MCV RBC AUTO: 90.3 FL (ref 79–97)
MONOCYTES # BLD AUTO: 0.54 10*3/MM3 (ref 0.1–0.9)
MONOCYTES NFR BLD AUTO: 10.3 % (ref 5–12)
NEUTROPHILS NFR BLD AUTO: 3.13 10*3/MM3 (ref 1.7–7)
NEUTROPHILS NFR BLD AUTO: 59.8 % (ref 42.7–76)
NITRITE UR QL STRIP: NEGATIVE
NRBC BLD AUTO-RTO: 0 /100 WBC (ref 0–0.2)
PH UR STRIP.AUTO: 7 [PH] (ref 5–8)
PHOSPHATE SERPL-MCNC: 3.7 MG/DL (ref 2.5–4.5)
PLATELET # BLD AUTO: 175 10*3/MM3 (ref 140–450)
PMV BLD AUTO: 11.5 FL (ref 6–12)
POTASSIUM SERPL-SCNC: 4.3 MMOL/L (ref 3.5–5.2)
PROT ?TM UR-MCNC: 20.9 MG/DL
PROT UR QL STRIP: ABNORMAL
PROT/CREAT UR: 0.27 MG/G{CREAT}
PTH-INTACT SERPL-MCNC: 46 PG/ML (ref 15–65)
RBC # BLD AUTO: 4.63 10*6/MM3 (ref 3.77–5.28)
RBC # UR STRIP: NORMAL /HPF
REF LAB TEST METHOD: NORMAL
SODIUM SERPL-SCNC: 140 MMOL/L (ref 136–145)
SP GR UR STRIP: 1.01 (ref 1–1.03)
SQUAMOUS #/AREA URNS HPF: NORMAL /HPF
UROBILINOGEN UR QL STRIP: ABNORMAL
WBC # UR STRIP: NORMAL /HPF
WBC NRBC COR # BLD AUTO: 5.23 10*3/MM3 (ref 3.4–10.8)

## 2025-07-07 PROCEDURE — 82306 VITAMIN D 25 HYDROXY: CPT

## 2025-07-07 PROCEDURE — 36415 COLL VENOUS BLD VENIPUNCTURE: CPT

## 2025-07-07 PROCEDURE — 81001 URINALYSIS AUTO W/SCOPE: CPT

## 2025-07-07 PROCEDURE — 83970 ASSAY OF PARATHORMONE: CPT

## 2025-07-07 PROCEDURE — 85025 COMPLETE CBC W/AUTO DIFF WBC: CPT

## 2025-07-07 PROCEDURE — 80069 RENAL FUNCTION PANEL: CPT

## 2025-07-07 PROCEDURE — 82570 ASSAY OF URINE CREATININE: CPT

## 2025-07-07 PROCEDURE — 84156 ASSAY OF PROTEIN URINE: CPT

## 2025-07-15 ENCOUNTER — OFFICE VISIT (OUTPATIENT)
Dept: CARDIAC SURGERY | Facility: CLINIC | Age: 85
End: 2025-07-15
Payer: MEDICARE

## 2025-07-15 VITALS
DIASTOLIC BLOOD PRESSURE: 67 MMHG | TEMPERATURE: 97 F | RESPIRATION RATE: 20 BRPM | HEIGHT: 59 IN | HEART RATE: 102 BPM | OXYGEN SATURATION: 98 % | WEIGHT: 210 LBS | SYSTOLIC BLOOD PRESSURE: 108 MMHG | BODY MASS INDEX: 42.33 KG/M2

## 2025-07-15 DIAGNOSIS — Z95.0 CARDIAC PACEMAKER: ICD-10-CM

## 2025-07-15 DIAGNOSIS — I38 HEART VALVE DISEASE: ICD-10-CM

## 2025-07-15 DIAGNOSIS — I71.20 INTRATHORACIC AORTIC ANEURYSM: ICD-10-CM

## 2025-07-15 DIAGNOSIS — I77.810 MILD ASCENDING AORTA DILATATION: ICD-10-CM

## 2025-07-15 DIAGNOSIS — I48.0 PAROXYSMAL ATRIAL FIBRILLATION: Primary | Chronic | ICD-10-CM

## 2025-07-15 PROCEDURE — 1160F RVW MEDS BY RX/DR IN RCRD: CPT

## 2025-07-15 PROCEDURE — 1159F MED LIST DOCD IN RCRD: CPT

## 2025-07-17 NOTE — PROGRESS NOTES
7/17/2025      Subjective:      Cindy Sal MD    Chief Complaint: Ascending aortic dilatation    History of Present Illness:       Dear Cindy Phillips MD and Colleagues,    It was nice to see Bella Ritter in consultation at your request. She is a 84 y.o. female who had a CT of the chest and an incidental finding of ascending aortic dilatation.  The CT of chest on 6/27/2025 was reviewed and the ascending aorta measures maximally at 4.4 cm.  She denies shortness of breath, chest/back pain, numbness/tingling/pain of extremities.  No vascular deficiencies or hyperextensible or hypermobile joints are noted on exam.      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    Pulmonary thromboembolism    PAT (paroxysmal atrial tachycardia)    Paroxysmal atrial fibrillation    Hypertension    Mild ascending aorta dilatation    Lumbar radiculopathy    Light headed    Indigestion    Hyperlipidemia    Heart valve disease    Heart palpitations    Diabetic nephropathy    Type 2 diabetes mellitus with diabetic chronic kidney disease    Degeneration of lumbar intervertebral disc    Cardiac pacemaker    Bradycardia    Peripheral eosinophilia    Chronic cough    Seasonal allergies    Elevated IgE level    Wheezing    Asthma    Chronic dyspnea    Obesity (BMI 30-39.9)    Gastroesophageal reflux disease    Intrathoracic aortic aneurysm       Past Medical History:   Diagnosis Date    Asthma      Atrial fibrillation     Atrial fibrillation     Back pain     Breast cancer     Cancer     breast    CHF (congestive heart failure)     Cirrhosis of liver not due to alcohol     Diabetes mellitus     GERD (gastroesophageal reflux disease)     Hypertension     Kidney stone     Neuropathy     Sleep apnea     CPAP       Past Surgical History:   Procedure Laterality Date     SECTION      CHOLECYSTECTOMY      COLONOSCOPY N/A 2022    Procedure: COLONOSCOPY WITH HOT SNARE PIECE MEAL POLYPECTOMY, ORISE INJECTION;  Surgeon: Pierre Delong MD;  Location: [facility] ENDOSCOPY;  Service: Gastroenterology;  Laterality: N/A;  COLON POLYPS    COLONOSCOPY N/A 2022    Procedure: COLONOSCOPY WITH POLYPECTOMY, CAUTERY;  Surgeon: Pierre Delong MD;  Location: [facility] ENDOSCOPY;  Service: Gastroenterology;  Laterality: N/A;  COLON POLYP    COLONOSCOPY N/A 2023    Procedure: COLONOSCOPY WITH HOT SNARE POLYPECTOMY;  Surgeon: Pierre Delong MD;  Location: [facility] ENDOSCOPY;  Service: Gastroenterology;  Laterality: N/A;  HEMORRHOID, COLON POLYP    ENDOSCOPY N/A 2022    Procedure: ESOPHAGOGASTRODUODENOSCOPY WITH HOT SNARE POLYPECTOMY, BIOPSIES, ORISE INJECTION AND CLIP APPLICATION X1;  Surgeon: Pierre Delong MD;  Location: [facility] ENDOSCOPY;  Service: Gastroenterology;  Laterality: N/A;  GASTRIC POLYP AND HIATAL HERNIA    EYE SURGERY Bilateral     cataracts    HERNIA REPAIR      inguinal    HYSTERECTOMY      KIDNEY STONE SURGERY      MASTECTOMY Bilateral     PACEMAKER IMPLANTATION         Allergies   Allergen Reactions    Aricept [Donepezil] Unknown - High Severity    Nsaids Unknown - High Severity and Unknown - Low Severity    Lisinopril Swelling    Pseudoephedrine Swelling    Diphenhydramine Unknown - Low Severity    Exenatide GI Intolerance         Current Outpatient Medications:     apixaban (ELIQUIS) 5 MG tablet tablet, Take 1 tablet by mouth 2 (Two) Times a Day., Disp: , Rfl:     B-D UF  III MINI PEN NEEDLES 31G X 5 MM misc, , Disp: , Rfl:     calcium carb-cholecalciferol 600-10 MG-MCG tablet per tablet, Every 12 (Twelve) Hours., Disp: , Rfl:     EPINEPHrine (EPIPEN) 0.3 MG/0.3ML solution auto-injector injection, , Disp: , Rfl:     fluticasone (FLONASE) 50 MCG/ACT nasal spray, , Disp: , Rfl:     fluticasone-salmeterol (ADVAIR HFA) 230-21 MCG/ACT inhaler, Inhale 2 puffs 2 (Two) Times a Day. Rinse mouth out after each use, Disp: 3 each, Rfl: 1    FREESTYLE LITE test strip, , Disp: , Rfl:     gabapentin (NEURONTIN) 600 MG tablet, Take 1 tablet by mouth 2 (Two) Times a Day., Disp: , Rfl:     hydrALAZINE (APRESOLINE) 50 MG tablet, , Disp: , Rfl:     ibandronate (BONIVA) 150 MG tablet, , Disp: , Rfl:     ibuprofen (ADVIL,MOTRIN) 800 MG tablet, Take 1 tablet by mouth Every 6 (Six) Hours As Needed., Disp: , Rfl:     Incruse Ellipta 62.5 MCG/ACT aerosol powder , Inhale 1 puff Daily for 90 days., Disp: 90 each, Rfl: 1    insulin glargine (LANTUS, SEMGLEE) 100 UNIT/ML injection, Inject 45 Units under the skin into the appropriate area as directed Every Night., Disp: , Rfl:     Lancets (freestyle) lancets, , Disp: , Rfl:     levalbuterol (XOPENEX HFA) 45 MCG/ACT inhaler, Inhale 2 puffs Every 4 (Four) Hours As Needed for Wheezing or Shortness of Air for up to 90 days., Disp: 3 each, Rfl: 1    montelukast (SINGULAIR) 10 MG tablet, Take 1 tablet by mouth Every Night for 90 days., Disp: 90 tablet, Rfl: 1    NovoLOG FlexPen 100 UNIT/ML solution pen-injector sc pen, Inject 20 Units under the skin into the appropriate area as directed 3 (Three) Times a Day With Meals., Disp: , Rfl:     olmesartan (BENICAR) 40 MG tablet, Take 10 mg by mouth Daily., Disp: , Rfl:     pantoprazole (PROTONIX) 40 MG EC tablet, Take 1 tablet by mouth Daily., Disp: , Rfl:     sodium chloride 0.65 % nasal spray, Every 6 (Six) Hours., Disp: , Rfl:     spironolactone-hydrochlorothiazide (ALDACTAZIDE) 25-25 MG tablet, Take 1 tablet by mouth  Daily., Disp: , Rfl:     vitamin D (ERGOCALCIFEROL) 1.25 MG (13473 UT) capsule capsule, , Disp: , Rfl:     cetirizine (zyrTEC) 10 MG tablet, Take 1 tablet by mouth Daily for 90 days., Disp: 90 tablet, Rfl: 3    metoprolol succinate XL (TOPROL-XL) 25 MG 24 hr tablet, Take 3 tablets by mouth Every 12 (Twelve) Hours for 30 days., Disp: 180 tablet, Rfl: 0    Ozempic, 0.25 or 0.5 MG/DOSE, 2 MG/3ML solution pen-injector, , Disp: , Rfl:     Trulicity 3 MG/0.5ML solution pen-injector, Inject 0.5 mL under the skin into the appropriate area as directed 1 (One) Time Per Week.  (Patient not taking: Reported on 2024), Disp: , Rfl:     Social History     Socioeconomic History    Marital status:    Tobacco Use    Smoking status: Never     Passive exposure: Past    Smokeless tobacco: Never   Vaping Use    Vaping status: Never Used   Substance and Sexual Activity    Alcohol use: Never    Drug use: Never    Sexual activity: Defer       Family History   Problem Relation Age of Onset    Colon polyps Father     Colon polyps Other     Malig Hyperthermia Neg Hx        The following portions of the patient's history were reviewed and updated as appropriate: She  has a past medical history of Asthma, Atrial fibrillation, Atrial fibrillation, Back pain, Breast cancer, Cancer, CHF (congestive heart failure), Cirrhosis of liver not due to alcohol, Diabetes mellitus, GERD (gastroesophageal reflux disease), Hypertension, Kidney stone, Neuropathy, and Sleep apnea.  She does not have any pertinent problems on file.  She  has a past surgical history that includes Pacemaker Implantation; Hysterectomy; Mastectomy (Bilateral); Kidney stone surgery;  section; Hernia repair; Eye surgery (Bilateral); Esophagogastroduodenoscopy (N/A, 2022); Colonoscopy (N/A, 2022); Colonoscopy (N/A, 2022); Colonoscopy (N/A, 2023); and Cholecystectomy.  Her family history includes Colon polyps in her father and another  family member.  She  reports that she has never smoked. She has been exposed to tobacco smoke. She has never used smokeless tobacco. She reports that she does not drink alcohol and does not use drugs.  Current Outpatient Medications   Medication Sig Dispense Refill    apixaban (ELIQUIS) 5 MG tablet tablet Take 1 tablet by mouth 2 (Two) Times a Day.      B-D UF III MINI PEN NEEDLES 31G X 5 MM misc       calcium carb-cholecalciferol 600-10 MG-MCG tablet per tablet Every 12 (Twelve) Hours.      EPINEPHrine (EPIPEN) 0.3 MG/0.3ML solution auto-injector injection       fluticasone (FLONASE) 50 MCG/ACT nasal spray       fluticasone-salmeterol (ADVAIR HFA) 230-21 MCG/ACT inhaler Inhale 2 puffs 2 (Two) Times a Day. Rinse mouth out after each use 3 each 1    FREESTYLE LITE test strip       gabapentin (NEURONTIN) 600 MG tablet Take 1 tablet by mouth 2 (Two) Times a Day.      hydrALAZINE (APRESOLINE) 50 MG tablet       ibandronate (BONIVA) 150 MG tablet       ibuprofen (ADVIL,MOTRIN) 800 MG tablet Take 1 tablet by mouth Every 6 (Six) Hours As Needed.      Incruse Ellipta 62.5 MCG/ACT aerosol powder  Inhale 1 puff Daily for 90 days. 90 each 1    insulin glargine (LANTUS, SEMGLEE) 100 UNIT/ML injection Inject 45 Units under the skin into the appropriate area as directed Every Night.      Lancets (freestyle) lancets       levalbuterol (XOPENEX HFA) 45 MCG/ACT inhaler Inhale 2 puffs Every 4 (Four) Hours As Needed for Wheezing or Shortness of Air for up to 90 days. 3 each 1    montelukast (SINGULAIR) 10 MG tablet Take 1 tablet by mouth Every Night for 90 days. 90 tablet 1    NovoLOG FlexPen 100 UNIT/ML solution pen-injector sc pen Inject 20 Units under the skin into the appropriate area as directed 3 (Three) Times a Day With Meals.      olmesartan (BENICAR) 40 MG tablet Take 10 mg by mouth Daily.      pantoprazole (PROTONIX) 40 MG EC tablet Take 1 tablet by mouth Daily.      sodium chloride 0.65 % nasal spray Every 6 (Six) Hours.       spironolactone-hydrochlorothiazide (ALDACTAZIDE) 25-25 MG tablet Take 1 tablet by mouth Daily.      vitamin D (ERGOCALCIFEROL) 1.25 MG (66141 UT) capsule capsule       cetirizine (zyrTEC) 10 MG tablet Take 1 tablet by mouth Daily for 90 days. 90 tablet 3    metoprolol succinate XL (TOPROL-XL) 25 MG 24 hr tablet Take 3 tablets by mouth Every 12 (Twelve) Hours for 30 days. 180 tablet 0    Ozempic, 0.25 or 0.5 MG/DOSE, 2 MG/3ML solution pen-injector  (Patient not taking: Reported on 7/15/2025)      Trulicity 3 MG/0.5ML solution pen-injector Inject 0.5 mL under the skin into the appropriate area as directed 1 (One) Time Per Week. THURSDAYS (Patient not taking: Reported on 12/4/2024)       No current facility-administered medications for this visit.     Current Outpatient Medications on File Prior to Visit   Medication Sig    apixaban (ELIQUIS) 5 MG tablet tablet Take 1 tablet by mouth 2 (Two) Times a Day.    B-D UF III MINI PEN NEEDLES 31G X 5 MM misc     calcium carb-cholecalciferol 600-10 MG-MCG tablet per tablet Every 12 (Twelve) Hours.    EPINEPHrine (EPIPEN) 0.3 MG/0.3ML solution auto-injector injection     fluticasone (FLONASE) 50 MCG/ACT nasal spray     fluticasone-salmeterol (ADVAIR HFA) 230-21 MCG/ACT inhaler Inhale 2 puffs 2 (Two) Times a Day. Rinse mouth out after each use    FREESTYLE LITE test strip     gabapentin (NEURONTIN) 600 MG tablet Take 1 tablet by mouth 2 (Two) Times a Day.    hydrALAZINE (APRESOLINE) 50 MG tablet     ibandronate (BONIVA) 150 MG tablet     ibuprofen (ADVIL,MOTRIN) 800 MG tablet Take 1 tablet by mouth Every 6 (Six) Hours As Needed.    Incruse Ellipta 62.5 MCG/ACT aerosol powder  Inhale 1 puff Daily for 90 days.    insulin glargine (LANTUS, SEMGLEE) 100 UNIT/ML injection Inject 45 Units under the skin into the appropriate area as directed Every Night.    Lancets (freestyle) lancets     levalbuterol (XOPENEX HFA) 45 MCG/ACT inhaler Inhale 2 puffs Every 4 (Four) Hours As Needed for  Wheezing or Shortness of Air for up to 90 days.    montelukast (SINGULAIR) 10 MG tablet Take 1 tablet by mouth Every Night for 90 days.    NovoLOG FlexPen 100 UNIT/ML solution pen-injector sc pen Inject 20 Units under the skin into the appropriate area as directed 3 (Three) Times a Day With Meals.    olmesartan (BENICAR) 40 MG tablet Take 10 mg by mouth Daily.    pantoprazole (PROTONIX) 40 MG EC tablet Take 1 tablet by mouth Daily.    sodium chloride 0.65 % nasal spray Every 6 (Six) Hours.    spironolactone-hydrochlorothiazide (ALDACTAZIDE) 25-25 MG tablet Take 1 tablet by mouth Daily.    vitamin D (ERGOCALCIFEROL) 1.25 MG (44112 UT) capsule capsule     cetirizine (zyrTEC) 10 MG tablet Take 1 tablet by mouth Daily for 90 days.    metoprolol succinate XL (TOPROL-XL) 25 MG 24 hr tablet Take 3 tablets by mouth Every 12 (Twelve) Hours for 30 days.    Ozempic, 0.25 or 0.5 MG/DOSE, 2 MG/3ML solution pen-injector  (Patient not taking: Reported on 7/15/2025)    Trulicity 3 MG/0.5ML solution pen-injector Inject 0.5 mL under the skin into the appropriate area as directed 1 (One) Time Per Week. THURSDAYS (Patient not taking: Reported on 12/4/2024)     No current facility-administered medications on file prior to visit.     She is allergic to aricept [donepezil], nsaids, lisinopril, pseudoephedrine, diphenhydramine, and exenatide..    Review of Systems:  Review of Systems    Physical Exam:    Vital Signs:  Weight: 95.3 kg (210 lb)   Body mass index is 42.41 kg/m².  Temp: 97 °F (36.1 °C)   Heart Rate: 102   BP: 108/67     Physical Exam     Assessment:     -Ascending aortic dilatation.  4.4 cm.  Stable surveillance 2023.  Follow-up in 2 years.    Recommendation/Plan:     We discussed the importance of avoidance of over the counter decongestants and stimulants, specifically pseudoephedrine, for hypertension and aneurysm management    Risk factor modification of hypertension with a goal blood pressure less than 130/80,  hyperlipidemia optimization, and continued avoidance of tobacco/nicotine products.    Initiation of low aerobic exercise is indicated to help reduce body habitus, assist with blood pressure and cholesterol control.       Although risk of rupture is low, emergency department presentation is warranted for acute chest, back, or abdominal pain, syncope, or stroke like symptoms    Thank you for allowing us to participate in her care.    Regards,    Jaylen Garnica MD    ** all problems new to this practitioner, extensive review of records prior and during patient interview, >45 minutes in face to face conversation regarding treatment plan, education, and answering any questions the patient may have had

## 2025-08-04 ENCOUNTER — OFFICE VISIT (OUTPATIENT)
Dept: PULMONOLOGY | Facility: CLINIC | Age: 85
End: 2025-08-04
Payer: MEDICARE

## 2025-08-04 ENCOUNTER — OFFICE VISIT (OUTPATIENT)
Dept: OTOLARYNGOLOGY | Facility: CLINIC | Age: 85
End: 2025-08-04
Payer: MEDICARE

## 2025-08-04 VITALS
HEIGHT: 59 IN | TEMPERATURE: 97.3 F | SYSTOLIC BLOOD PRESSURE: 147 MMHG | RESPIRATION RATE: 16 BRPM | HEART RATE: 61 BPM | DIASTOLIC BLOOD PRESSURE: 93 MMHG | WEIGHT: 215.2 LBS | BODY MASS INDEX: 43.38 KG/M2 | OXYGEN SATURATION: 98 %

## 2025-08-04 VITALS
HEART RATE: 90 BPM | OXYGEN SATURATION: 98 % | BODY MASS INDEX: 42.94 KG/M2 | TEMPERATURE: 97.5 F | HEIGHT: 59 IN | WEIGHT: 213 LBS

## 2025-08-04 DIAGNOSIS — E03.9 HYPOTHYROIDISM (ACQUIRED): Primary | ICD-10-CM

## 2025-08-04 DIAGNOSIS — H90.3 SENSORINEURAL HEARING LOSS, BILATERAL: ICD-10-CM

## 2025-08-04 DIAGNOSIS — R06.09 CHRONIC DYSPNEA: ICD-10-CM

## 2025-08-04 DIAGNOSIS — Z78.9 NEVER SMOKED CIGARETTES: ICD-10-CM

## 2025-08-04 DIAGNOSIS — R76.8 ELEVATED IGE LEVEL: ICD-10-CM

## 2025-08-04 DIAGNOSIS — K21.9 GASTROESOPHAGEAL REFLUX DISEASE, UNSPECIFIED WHETHER ESOPHAGITIS PRESENT: ICD-10-CM

## 2025-08-04 DIAGNOSIS — E04.2 MULTINODULAR GOITER: ICD-10-CM

## 2025-08-04 DIAGNOSIS — J45.909 ASTHMA, UNSPECIFIED ASTHMA SEVERITY, UNSPECIFIED WHETHER COMPLICATED, UNSPECIFIED WHETHER PERSISTENT: ICD-10-CM

## 2025-08-04 DIAGNOSIS — J30.2 SEASONAL ALLERGIES: Primary | ICD-10-CM

## 2025-08-04 DIAGNOSIS — D72.19 PERIPHERAL EOSINOPHILIA: ICD-10-CM

## 2025-08-04 PROCEDURE — 99213 OFFICE O/P EST LOW 20 MIN: CPT | Performed by: OTOLARYNGOLOGY

## 2025-08-04 PROCEDURE — 1159F MED LIST DOCD IN RCRD: CPT | Performed by: OTOLARYNGOLOGY

## 2025-08-04 PROCEDURE — 1160F RVW MEDS BY RX/DR IN RCRD: CPT | Performed by: OTOLARYNGOLOGY

## 2025-08-04 PROCEDURE — G2211 COMPLEX E/M VISIT ADD ON: HCPCS | Performed by: NURSE PRACTITIONER

## 2025-08-04 PROCEDURE — 1160F RVW MEDS BY RX/DR IN RCRD: CPT | Performed by: NURSE PRACTITIONER

## 2025-08-04 PROCEDURE — 1159F MED LIST DOCD IN RCRD: CPT | Performed by: NURSE PRACTITIONER

## 2025-08-04 PROCEDURE — 99214 OFFICE O/P EST MOD 30 MIN: CPT | Performed by: NURSE PRACTITIONER

## 2025-08-04 RX ORDER — FLUTICASONE PROPIONATE AND SALMETEROL XINAFOATE 230; 21 UG/1; UG/1
2 AEROSOL, METERED RESPIRATORY (INHALATION)
Qty: 3 EACH | Refills: 1 | Status: SHIPPED | OUTPATIENT
Start: 2025-08-04

## 2025-08-04 RX ORDER — UMECLIDINIUM 62.5 UG/1
1 AEROSOL, POWDER ORAL
Qty: 90 EACH | Refills: 1 | Status: SHIPPED | OUTPATIENT
Start: 2025-08-04 | End: 2025-11-02

## (undated) DEVICE — Device: Brand: SINGLE USE INJECTOR NM600/610

## (undated) DEVICE — DISPOSABLE DISTAL ATTACHMENT: Brand: DISPOSABLE DISTAL ATTACHMENT

## (undated) DEVICE — Device: Brand: DEFENDO AIR/WATER/SUCTION AND BIOPSY VALVE

## (undated) DEVICE — SOL IRRG H2O PL/BG 1000ML STRL

## (undated) DEVICE — THE SINGLE USE ETRAP – POLYP TRAP IS USED FOR SUCTION RETRIEVAL OF ENDOSCOPICALLY REMOVED POLYPS.: Brand: ETRAP

## (undated) DEVICE — SNAR E/S POLYP SNAREMASTER OVL/10MM 2.8X2300MM YEL

## (undated) DEVICE — Device

## (undated) DEVICE — SINGLE-USE BIOPSY FORCEPS: Brand: RADIAL JAW 4

## (undated) DEVICE — EGD OR ERCP KIT: Brand: MEDLINE INDUSTRIES, INC.

## (undated) DEVICE — KT SYR GEL ORISE SNGL PK 10ML

## (undated) DEVICE — COLON KIT: Brand: MEDLINE INDUSTRIES, INC.

## (undated) DEVICE — SNAR POLYP CAPTIFLEX XS/OVL 11X2.4MM 240CM 1P/U

## (undated) DEVICE — SOLIDIFIER LIQLOC PLS 1500CC BT

## (undated) DEVICE — SUREFIT, DUAL DISPERSIVE ELECTRODE, CONTACT QUALITY MONITOR: Brand: SUREFIT

## (undated) DEVICE — SNAR POLYP CAPTIVATOR CRSNT 27MM 240CM

## (undated) DEVICE — Device: Brand: DISPOSABLE ELECTROSURGICAL SNARE